# Patient Record
Sex: MALE | Race: WHITE | NOT HISPANIC OR LATINO | Employment: OTHER | URBAN - METROPOLITAN AREA
[De-identification: names, ages, dates, MRNs, and addresses within clinical notes are randomized per-mention and may not be internally consistent; named-entity substitution may affect disease eponyms.]

---

## 2017-05-12 ENCOUNTER — ALLSCRIPTS OFFICE VISIT (OUTPATIENT)
Dept: OTHER | Facility: OTHER | Age: 72
End: 2017-05-12

## 2017-05-12 LAB — HBA1C MFR BLD HPLC: NORMAL %

## 2017-06-13 ENCOUNTER — LAB REQUISITION (OUTPATIENT)
Dept: LAB | Facility: HOSPITAL | Age: 72
End: 2017-06-13
Payer: MEDICARE

## 2017-06-13 ENCOUNTER — ALLSCRIPTS OFFICE VISIT (OUTPATIENT)
Dept: OTHER | Facility: OTHER | Age: 72
End: 2017-06-13

## 2017-06-13 DIAGNOSIS — E11.9 TYPE 2 DIABETES MELLITUS WITHOUT COMPLICATIONS (HCC): ICD-10-CM
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
## 2017-06-13 DIAGNOSIS — Z12.11 ENCOUNTER FOR SCREENING FOR MALIGNANT NEOPLASM OF COLON: ICD-10-CM

## 2017-06-13 DIAGNOSIS — D48.5 NEOPLASM OF UNCERTAIN BEHAVIOR OF SKIN: ICD-10-CM

## 2017-06-13 LAB
ALBUMIN SERPL BCP-MCNC: 3.7 G/DL (ref 3.5–5)
ALP SERPL-CCNC: 89 U/L (ref 46–116)
ALT SERPL W P-5'-P-CCNC: 16 U/L (ref 12–78)
ANION GAP SERPL CALCULATED.3IONS-SCNC: 12 MMOL/L (ref 4–13)
AST SERPL W P-5'-P-CCNC: 16 U/L (ref 5–45)
BILIRUB SERPL-MCNC: 0.4 MG/DL (ref 0.2–1)
BUN SERPL-MCNC: 33 MG/DL (ref 5–25)
CALCIUM SERPL-MCNC: 8.7 MG/DL (ref 8.3–10.1)
CHLORIDE SERPL-SCNC: 105 MMOL/L (ref 100–108)
CHOLEST SERPL-MCNC: 159 MG/DL (ref 50–200)
CO2 SERPL-SCNC: 21 MMOL/L (ref 21–32)
CREAT SERPL-MCNC: 1.44 MG/DL (ref 0.6–1.3)
CREAT UR-MCNC: 152 MG/DL
ERYTHROCYTE [DISTWIDTH] IN BLOOD BY AUTOMATED COUNT: 19.7 % (ref 11.6–15.1)
EST. AVERAGE GLUCOSE BLD GHB EST-MCNC: 154 MG/DL
GFR SERPL CREATININE-BSD FRML MDRD: 48.5 ML/MIN/1.73SQ M
GLUCOSE P FAST SERPL-MCNC: 87 MG/DL (ref 65–99)
HBA1C MFR BLD: 7 % (ref 4.2–6.3)
HCT VFR BLD AUTO: 33.8 % (ref 42–52)
HDLC SERPL-MCNC: 53 MG/DL (ref 40–60)
HGB BLD-MCNC: 10.8 G/DL (ref 14–18)
LDLC SERPL CALC-MCNC: 88 MG/DL (ref 0–100)
MCH RBC QN AUTO: 23.5 PG (ref 27–31)
MCHC RBC AUTO-ENTMCNC: 31.9 G/DL (ref 31.4–37.4)
MCV RBC AUTO: 74 FL (ref 82–98)
MICROALBUMIN UR-MCNC: 15.8 MG/L (ref 0–20)
MICROALBUMIN/CREAT 24H UR: 10 MG/G CREATININE (ref 0–30)
PLATELET # BLD AUTO: 220 THOUSANDS/UL (ref 130–400)
PMV BLD AUTO: 9.8 FL (ref 8.9–12.7)
POTASSIUM SERPL-SCNC: 5.2 MMOL/L (ref 3.5–5.3)
PROT SERPL-MCNC: 7.1 G/DL (ref 6.4–8.2)
RBC # BLD AUTO: 4.59 MILLION/UL (ref 4.7–6.1)
SODIUM SERPL-SCNC: 138 MMOL/L (ref 136–145)
TRIGL SERPL-MCNC: 88 MG/DL
WBC # BLD AUTO: 7.3 THOUSAND/UL (ref 4.8–10.8)

## 2017-06-13 PROCEDURE — 82570 ASSAY OF URINE CREATININE: CPT | Performed by: FAMILY MEDICINE

## 2017-06-13 PROCEDURE — 80053 COMPREHEN METABOLIC PANEL: CPT | Performed by: FAMILY MEDICINE

## 2017-06-13 PROCEDURE — 85027 COMPLETE CBC AUTOMATED: CPT | Performed by: FAMILY MEDICINE

## 2017-06-13 PROCEDURE — 36415 COLL VENOUS BLD VENIPUNCTURE: CPT | Performed by: FAMILY MEDICINE

## 2017-06-13 PROCEDURE — 88304 TISSUE EXAM BY PATHOLOGIST: CPT | Performed by: FAMILY MEDICINE

## 2017-06-13 PROCEDURE — 82043 UR ALBUMIN QUANTITATIVE: CPT | Performed by: FAMILY MEDICINE

## 2017-06-13 PROCEDURE — 80061 LIPID PANEL: CPT | Performed by: FAMILY MEDICINE

## 2017-06-13 PROCEDURE — 83036 HEMOGLOBIN GLYCOSYLATED A1C: CPT | Performed by: FAMILY MEDICINE

## 2017-06-13 PROCEDURE — 86617 LYME DISEASE ANTIBODY: CPT | Performed by: FAMILY MEDICINE

## 2017-06-15 LAB
B BURGDOR IGG PATRN SER IB-IMP: POSITIVE
B BURGDOR IGM PATRN SER IB-IMP: NEGATIVE
B BURGDOR18KD IGG SER QL IB: PRESENT
B BURGDOR23KD IGG SER QL IB: ABNORMAL
B BURGDOR23KD IGM SER QL IB: PRESENT
B BURGDOR28KD IGG SER QL IB: PRESENT
B BURGDOR30KD IGG SER QL IB: PRESENT
B BURGDOR39KD IGG SER QL IB: PRESENT
B BURGDOR39KD IGM SER QL IB: ABNORMAL
B BURGDOR41KD IGG SER QL IB: PRESENT
B BURGDOR41KD IGM SER QL IB: ABNORMAL
B BURGDOR45KD IGG SER QL IB: ABNORMAL
B BURGDOR58KD IGG SER QL IB: PRESENT
B BURGDOR66KD IGG SER QL IB: ABNORMAL
B BURGDOR93KD IGG SER QL IB: PRESENT

## 2017-06-18 ENCOUNTER — GENERIC CONVERSION - ENCOUNTER (OUTPATIENT)
Dept: OTHER | Facility: OTHER | Age: 72
End: 2017-06-18

## 2017-06-22 ENCOUNTER — GENERIC CONVERSION - ENCOUNTER (OUTPATIENT)
Dept: OTHER | Facility: OTHER | Age: 72
End: 2017-06-22

## 2017-06-28 ENCOUNTER — ALLSCRIPTS OFFICE VISIT (OUTPATIENT)
Dept: OTHER | Facility: OTHER | Age: 72
End: 2017-06-28

## 2017-09-27 ENCOUNTER — LAB REQUISITION (OUTPATIENT)
Dept: LAB | Facility: HOSPITAL | Age: 72
End: 2017-09-27
Payer: MEDICARE

## 2017-09-27 ENCOUNTER — ALLSCRIPTS OFFICE VISIT (OUTPATIENT)
Dept: OTHER | Facility: OTHER | Age: 72
End: 2017-09-27

## 2017-09-27 DIAGNOSIS — D64.9 ANEMIA: ICD-10-CM

## 2017-09-27 DIAGNOSIS — E11.9 TYPE 2 DIABETES MELLITUS WITHOUT COMPLICATIONS (HCC): ICD-10-CM

## 2017-09-27 LAB
ALBUMIN SERPL BCP-MCNC: 3.6 G/DL (ref 3.5–5)
ALP SERPL-CCNC: 100 U/L (ref 46–116)
ALT SERPL W P-5'-P-CCNC: 18 U/L (ref 12–78)
ANION GAP SERPL CALCULATED.3IONS-SCNC: 13 MMOL/L (ref 4–13)
AST SERPL W P-5'-P-CCNC: 16 U/L (ref 5–45)
BASOPHILS # BLD AUTO: 0.1 THOUSANDS/ΜL (ref 0–0.1)
BASOPHILS NFR BLD AUTO: 1 % (ref 0–1)
BILIRUB SERPL-MCNC: 0.5 MG/DL (ref 0.2–1)
BUN SERPL-MCNC: 31 MG/DL (ref 5–25)
CALCIUM SERPL-MCNC: 8.9 MG/DL (ref 8.3–10.1)
CHLORIDE SERPL-SCNC: 106 MMOL/L (ref 100–108)
CO2 SERPL-SCNC: 21 MMOL/L (ref 21–32)
CREAT SERPL-MCNC: 1.24 MG/DL (ref 0.6–1.3)
CRP SERPL QL: <3 MG/L
EOSINOPHIL # BLD AUTO: 0.1 THOUSAND/ΜL (ref 0–0.61)
EOSINOPHIL NFR BLD AUTO: 3 % (ref 0–6)
ERYTHROCYTE [DISTWIDTH] IN BLOOD BY AUTOMATED COUNT: 16.8 % (ref 11.6–15.1)
EST. AVERAGE GLUCOSE BLD GHB EST-MCNC: 160 MG/DL
GFR SERPL CREATININE-BSD FRML MDRD: 59 ML/MIN/1.73SQ M
GLUCOSE SERPL-MCNC: 90 MG/DL (ref 65–140)
HBA1C MFR BLD: 7.2 % (ref 4.2–6.3)
HCT VFR BLD AUTO: 34.3 % (ref 42–52)
HGB BLD-MCNC: 10.8 G/DL (ref 14–18)
IRON SERPL-MCNC: 32 UG/DL (ref 65–175)
LYMPHOCYTES # BLD AUTO: 1 THOUSANDS/ΜL (ref 0.6–4.47)
LYMPHOCYTES NFR BLD AUTO: 18 % (ref 14–44)
MCH RBC QN AUTO: 23.8 PG (ref 27–31)
MCHC RBC AUTO-ENTMCNC: 31.6 G/DL (ref 31.4–37.4)
MCV RBC AUTO: 76 FL (ref 82–98)
MONOCYTES # BLD AUTO: 0.4 THOUSAND/ΜL (ref 0.17–1.22)
MONOCYTES NFR BLD AUTO: 8 % (ref 4–12)
NEUTROPHILS # BLD AUTO: 4.1 THOUSANDS/ΜL (ref 1.85–7.62)
NEUTS SEG NFR BLD AUTO: 71 % (ref 43–75)
PLATELET # BLD AUTO: 224 THOUSANDS/UL (ref 130–400)
PMV BLD AUTO: 9.1 FL (ref 8.9–12.7)
POTASSIUM SERPL-SCNC: 5.3 MMOL/L (ref 3.5–5.3)
PROT SERPL-MCNC: 7.1 G/DL (ref 6.4–8.2)
RBC # BLD AUTO: 4.55 MILLION/UL (ref 4.7–6.1)
SODIUM SERPL-SCNC: 140 MMOL/L (ref 136–145)
WBC # BLD AUTO: 5.7 THOUSAND/UL (ref 4.8–10.8)

## 2017-09-27 PROCEDURE — 85025 COMPLETE CBC W/AUTO DIFF WBC: CPT | Performed by: FAMILY MEDICINE

## 2017-09-27 PROCEDURE — 83036 HEMOGLOBIN GLYCOSYLATED A1C: CPT | Performed by: FAMILY MEDICINE

## 2017-09-27 PROCEDURE — 80053 COMPREHEN METABOLIC PANEL: CPT | Performed by: FAMILY MEDICINE

## 2017-09-27 PROCEDURE — 83540 ASSAY OF IRON: CPT | Performed by: FAMILY MEDICINE

## 2017-09-27 PROCEDURE — 36415 COLL VENOUS BLD VENIPUNCTURE: CPT | Performed by: FAMILY MEDICINE

## 2017-09-27 PROCEDURE — 86140 C-REACTIVE PROTEIN: CPT | Performed by: FAMILY MEDICINE

## 2017-09-27 PROCEDURE — 83020 HEMOGLOBIN ELECTROPHORESIS: CPT | Performed by: FAMILY MEDICINE

## 2017-10-02 ENCOUNTER — GENERIC CONVERSION - ENCOUNTER (OUTPATIENT)
Dept: OTHER | Facility: OTHER | Age: 72
End: 2017-10-02

## 2017-10-02 LAB
HGB A MFR BLD: 1.7 % (ref 0.7–3.1)
HGB A MFR BLD: 98.3 % (ref 94–98)
HGB C MFR BLD: 0 %
HGB F MFR BLD: 0 % (ref 0–2)
HGB FRACT BLD-IMP: ABNORMAL
HGB S BLD QL SOLY: NEGATIVE
HGB S MFR BLD: 0 %

## 2017-12-06 ENCOUNTER — ALLSCRIPTS OFFICE VISIT (OUTPATIENT)
Dept: OTHER | Facility: OTHER | Age: 72
End: 2017-12-06

## 2017-12-06 LAB — HBA1C MFR BLD HPLC: 6.8 %

## 2017-12-07 NOTE — PROGRESS NOTES
Assessment    1  Type 2 diabetes mellitus (250 00) (E11 9)   2  Anemia, unspecified type (285 9) (D64 9)   3  History of Acute bilateral low back pain with left-sided sciatica (724 2,724 3) (M54 42)    Plan  Type 2 diabetes mellitus    · Follow-up visit in 6 months Evaluation and Treatment  Follow-up  Status: Hold For -Scheduling  Requested for: 78LKJ2956   · Hemoglobin A1c- POC; Status:Active - Perform Order; Requested for:02Umy2904;    · Begin a limited exercise program ; Status:Complete;   Done: 10LRD4024   · Continue with our present treatment plan ; Status:Complete;   Done: 43UVO4238   · Eat a normal well-balanced diet ; Status:Complete;   Done: 40ULQ9266   · Have your eyes examined by an eye doctor every year ; Status:Complete;   Done:60Dar7971   · Inspect your feet daily ; Status:Complete;   Done: 15KSG9892   · It is important to take good care of your feet if you have diabetes ; Status:Complete;  Done: 90JDE9775   · Restrict the salt in your diet by avoiding highly salted foods ; Status:Complete;   Done:12Qrm4543   · We recommend that you bring your body mass index down to 26 ; Status:Complete;  Done: 08RAF7194    Discussion/Summary    PLENTY OF FLUIDSLEAFY VEGETABLESSUPPLEMENT - FERROUS SULFATE 89 M - 1 TWICE A 40 Ivy Olegario M  The treatment plan was reviewed with the patient/guardian  The patient/guardian understands and agrees with the treatment plan      Chief Complaint  Patient is here today for a diabetic visit, L  Na/CORDELL      History of Present Illness  PATIENT RETURNS TO REVIEW MEDICAL ISSUES  IRON IN 2000 [facility] IN [facility]OL[facility]  MEDICATION WELLFOR HGB A1C  ACHEIMPROVEDCONCERNS      Review of Systems   Constitutional: no fever,-- no chills-- and-- not feeling tired  Eyes: no eyesight problems  ENT: no sore throat-- and-- no nasal discharge  Cardiovascular: no chest pain,-- the heart rate was not fast,-- no palpitations-- and-- no extremity edema    Respiratory: no shortness of breath,-- no cough,-- no wheezing-- and-- no shortness of breath during exertion  Gastrointestinal: no abdominal pain,-- no nausea,-- no constipation-- and-- no diarrhea  Musculoskeletal: arthralgias-- and-- joint stiffness, but-- no myalgias  Integumentary: no rashes-- and-- no skin lesions  Neurological: no headache,-- no numbness,-- no tingling-- and-- no dizziness  Psychiatric: no anxiety-- and-- no depression  Endocrine: no muscle weakness  Hematologic/Lymphatic: no swollen glands  ROS reviewed  Active Problems  1  Anemia, unspecified type (285 9) (D64 9)   2  Colon cancer screening (V76 51) (Z12 11)   3  Refused influenza vaccine (V64 06) (Z28 21)   4  Type 2 diabetes mellitus (250 00) (E11 9)    Past Medical History  1  History of Acute bilateral low back pain with left-sided sciatica (724 2,724 3) (M54 42)   2  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)   3  History of Tick bite, subsequent encounter (V58 89,919 4) (W57 XXXD)    The active problems and past medical history were reviewed and updated today  Surgical History  1  History of High Gastric Bypass   2  History of Knee Replacement    The surgical history was reviewed and updated today  Family History  Mother    1  Family history of congestive heart failure (V17 49) (Z82 49)  Family History    2  Denied: Family history of mental disorder   3  Denied: Family history of substance abuse    The family history was reviewed and updated today  Social History     · Active advance directive (V49 89) (Z78 9)   · Alcohol use (V49 89) (Z78 9)   · Caffeine use (V49 89) (F15 90)   · Dental care, regularly   · Never smoked  The social history was reviewed and updated today  Current Meds   1  Actos 45 MG Oral Tablet; TAKE 1 TABLET ONCE DAILY; Therapy: (Recorded:26Itw1893) to Recorded   2  Farxiga 5 MG Oral Tablet; TAKE 1 TABLET BY MOUTH EVERY MORNING;  Therapy: (Recorded:12May2017) to Recorded 3  Glimepiride 4 MG Oral Tablet; Take 1 tablet twice daily; Therapy: (Recorded:85Fet5071) to Recorded   4  MetFORMIN HCl - 500 MG Oral Tablet; take 2 tablet twice daily; Therapy: (Recorded:76Qsm8420) to Recorded    The medication list was reviewed and updated today  Allergies  1  morphine    Vitals  Vital Signs    Recorded: 24NTD3085 09:03AM   Temperature 98 2 F, Temporal   Heart Rate 68, L Radial   Pulse Quality Normal, L Radial   Respiration Quality Normal   Respiration 16   Systolic 430, LUE, Sitting   Diastolic 90, LUE, Sitting   Height 5 ft 9 5 in   Weight 241 lb    BMI Calculated 35 08   BSA Calculated 2 25   O2 Saturation 98       Physical Exam   Constitutional  General appearance: No acute distress, well appearing and well nourished  Eyes  Conjunctiva and lids: No swelling, erythema, or discharge  Pupils and irises: Equal, round and reactive to light  Ears, Nose, Mouth, and Throat  External inspection of ears and nose: Normal    Pulmonary  Respiratory effort: No increased work of breathing or signs of respiratory distress  Auscultation of lungs: Clear to auscultation, equal breath sounds bilaterally, no wheezes, no rales, no rhonci  Cardiovascular  Auscultation of heart: Normal rate and rhythm, normal S1 and S2, without murmurs  Examination of extremities for edema and/or varicosities: Normal    Abdomen  Abdomen: Non-tender, no masses  Liver and spleen: No hepatomegaly or splenomegaly  Lymphatic  Palpation of lymph nodes in neck: No lymphadenopathy  Musculoskeletal  Gait and station: Normal    Digits and nails: Abnormal    Inspection/palpation of joints, bones, and muscles: Abnormal    Skin  Skin and subcutaneous tissue: Normal without rashes or lesions  Neurologic  Sensation: No sensory loss     Psychiatric  Orientation to person, place and time: Normal    Mood and affect: Normal          Future Appointments    Date/Time Provider Specialty Site   06/04/2018 09:00 AM Pina Malone MD [facility]   Electronically signed by : Liam Steven MD; Dec  6 2017  9:45AM EST                       (Author)

## 2018-01-12 VITALS
HEART RATE: 82 BPM | WEIGHT: 236 LBS | RESPIRATION RATE: 16 BRPM | TEMPERATURE: 98.3 F | HEIGHT: 70 IN | DIASTOLIC BLOOD PRESSURE: 90 MMHG | SYSTOLIC BLOOD PRESSURE: 150 MMHG | BODY MASS INDEX: 33.79 KG/M2

## 2018-01-12 VITALS
BODY MASS INDEX: 35.07 KG/M2 | HEART RATE: 70 BPM | TEMPERATURE: 98.2 F | OXYGEN SATURATION: 96 % | RESPIRATION RATE: 20 BRPM | DIASTOLIC BLOOD PRESSURE: 74 MMHG | HEIGHT: 70 IN | SYSTOLIC BLOOD PRESSURE: 120 MMHG | WEIGHT: 245 LBS

## 2018-01-12 NOTE — RESULT NOTES
Verified Results  (1) CBC/PLT/DIFF 60HKT3783 09:39AM Brittney Aggarwal Order Number: [id number]     Test Name Result Flag Reference   WBC COUNT 5 70 Thousand/uL  4 80-10 80   RBC COUNT 4 55 Million/uL L 4 70-6 10   HEMOGLOBIN 10 8 g/dL L 14 0-18 0   HEMATOCRIT 34 3 % L 42 0-52 0   MCV 76 fL L 82-98   MCH 23 8 pg L 27 0-31 0   MCHC 31 6 g/dL  31 4-37 4   RDW 16 8 % H 11 6-15 1   MPV 9 1 fL  8 9-12 7   PLATELET COUNT 151 Thousands/uL  130-400   NEUTROPHILS RELATIVE PERCENT 71 %  43-75   LYMPHOCYTES RELATIVE PERCENT 18 %  14-44   MONOCYTES RELATIVE PERCENT 8 %  4-12   EOSINOPHILS RELATIVE PERCENT 3 %  0-6   BASOPHILS RELATIVE PERCENT 1 %  0-1   NEUTROPHILS ABSOLUTE COUNT 4 10 Thousands/? ??L  1 85-7 62   LYMPHOCYTES ABSOLUTE COUNT 1 00 Thousands/? ??L  0 60-4 47   MONOCYTES ABSOLUTE COUNT 0 40 Thousand/? ??L  0 17-1 22   EOSINOPHILS ABSOLUTE COUNT 0 10 Thousand/? ??L  0 00-0 61   BASOPHILS ABSOLUTE COUNT 0 10 Thousands/? ??L  0 00-0 10     (1) COMPREHENSIVE METABOLIC PANEL 98ALM1698 26:93PA Brittney Aggarwal Order Number: [id number]     Test Name Result Flag Reference   GLUCOSE,RANDM 90 mg/dL     If the patient is fasting, the ADA then defines impaired fasting glucose as > 100 mg/dL and diabetes as > or equal to 123 mg/dL  Specimen collection should occur prior to Sulfasalazine administration due to the potential for falsely depressed results  Specimen collection should occur prior to Sulfapyridine administration due to the potential for falsely elevated results     SODIUM 140 mmol/L  136-145   POTASSIUM 5 3 mmol/L  3 5-5 3   CHLORIDE 106 mmol/L  100-108   CARBON DIOXIDE 21 mmol/L  21-32   ANION GAP (CALC) 13 mmol/L  4-13   BLOOD UREA NITROGEN 31 mg/dL H 5-25   CREATININE 1 24 mg/dL  0 60-1 30   Standardized to IDMS reference method   CALCIUM 8 9 mg/dL  8 3-10 1   BILI, TOTAL 0 50 mg/dL  0 20-1 00   ALK PHOSPHATAS 100 U/L     ALT (SGPT) 18 U/L  12-78   Specimen collection should occur prior to Sulfasalazine administration due to the potential for falsely depressed results  AST(SGOT) 16 U/L  5-45   Specimen collection should occur prior to Sulfasalazine administration due to the potential for falsely depressed results  ALBUMIN 3 6 g/dL  3 5-5 0   TOTAL PROTEIN 7 1 g/dL  6 4-8 2   eGFR 59 ml/min/1 73sq m     [facility] Disease Education Program recommendations are as follows:  GFR calculation is accurate only with a steady state creatinine  Chronic Kidney disease less than 60 ml/min/1 73 sq  meters  Kidney failure less than 15 ml/min/1 73 sq  meters  (1) C-REACTIVE PROTEIN 97QKR9288 09:39AM Kit Minor   TW Order Number: [id number]     Test Name Result Flag Reference   C-REACT PROTEIN <3 0 mg/L  <3 0     (1) HEMOGLOBIN ELECTROPHORESIS 14UKM4689 09:39AM Kit Minor   TW Order Number: [id number]     Test Name Result Flag Reference   HEMOGLOBIN A2 QUANTITATION 1 7 %  0 7 - 3 1   HEMOGLOBIN C QUANTITATION 0 0 %  0 0   HEMOGLOBIN F QUANTITATION 0 0 %  0 0 - 2 0   HEMOGLOBIN S QUANTITATION 0 0 %  0 0   HEMOGLOBIN SOLUBILITY Negative  Negative   HGB INTERPRETATION Comment     Normal adult hemoglobin present  HGB A 98 3 % H 94 0 - 98 0   Performed at:  [facility] [address]  [id number]  : Hortensia Floyd MD, Phone:  [phone]     (1) HEMOGLOBIN A1C 33PKX3349 09:39AM Jessica Rucker Order Number: [id number]     Test Name Result Flag Reference   HEMOGLOBIN A1C 7 2 % H 4 2-6 3   EST  AVG  GLUCOSE 160 mg/dl       (1) IRON 54QBD7699 09:39AM Kit Minor   TW Order Number: [id number]     Test Name Result Flag Reference   IRON 32 ug/dL L    Patients treated with metal-binding drugs (ie  Deferoxamine) may have depressed iron values

## 2018-01-13 VITALS
DIASTOLIC BLOOD PRESSURE: 80 MMHG | SYSTOLIC BLOOD PRESSURE: 128 MMHG | RESPIRATION RATE: 20 BRPM | TEMPERATURE: 98.5 F | HEIGHT: 70 IN | HEART RATE: 70 BPM | WEIGHT: 239 LBS | OXYGEN SATURATION: 95 % | BODY MASS INDEX: 34.22 KG/M2

## 2018-01-14 VITALS
OXYGEN SATURATION: 98 % | WEIGHT: 240 LBS | RESPIRATION RATE: 16 BRPM | HEIGHT: 70 IN | TEMPERATURE: 98.7 F | SYSTOLIC BLOOD PRESSURE: 134 MMHG | BODY MASS INDEX: 34.36 KG/M2 | DIASTOLIC BLOOD PRESSURE: 70 MMHG | HEART RATE: 60 BPM

## 2018-01-15 NOTE — RESULT NOTES
Verified Results  (1) CBC/ PLT (NO DIFF) 06QYA3432 11:27AM [employer] Order Number: [id number]     Test Name Result Flag Reference   HEMATOCRIT 33 8 % L 42 0-52 0   HEMOGLOBIN 10 8 g/dL L 14 0-18 0   MCHC 31 9 g/dL  31 4-37 4   MCH 23 5 pg L 27 0-31 0   MCV 74 fL L 82-98   PLATELET COUNT 022 Thousands/uL  130-400   RBC COUNT 4 59 Million/uL L 4 70-6 10   RDW 19 7 % H 11 6-15 1   WBC COUNT 7 30 Thousand/uL  4 80-10 80   MPV 9 8 fL  8 9-12 7     (1) COMPREHENSIVE METABOLIC PANEL 62WCN7374 91:40NS [employer] Order Number: [id number]     Test Name Result Flag Reference   SODIUM 138 mmol/L  136-145   POTASSIUM 5 2 mmol/L  3 5-5 3   CHLORIDE 105 mmol/L  100-108   CARBON DIOXIDE 21 mmol/L  21-32   ANION GAP (CALC) 12 mmol/L  4-13   BLOOD UREA NITROGEN 33 mg/dL H 5-25   CREATININE 1 44 mg/dL H 0 60-1 30   Standardized to IDMS reference method   CALCIUM 8 7 mg/dL  8 3-10 1   BILI, TOTAL 0 40 mg/dL  0 20-1 00   ALK PHOSPHATAS 89 U/L     ALT (SGPT) 16 U/L  12-78   AST(SGOT) 16 U/L  5-45   ALBUMIN 3 7 g/dL  3 5-5 0   TOTAL PROTEIN 7 1 g/dL  6 4-8 2   eGFR Non-African American 48 5 ml/min/1 73sq m     National Kidney Disease Education Program recommendations are as follows:  GFR calculation is accurate only with a steady state creatinine  Chronic Kidney disease less than 60 ml/min/1 73 sq  meters  Kidney failure less than 15 ml/min/1 73 sq  meters  GLUCOSE FASTING 87 mg/dL  65-99     (1) HEMOGLOBIN A1C 13Jun2017 11:27AM [employer] Order Number: [id number]     Test Name Result Flag Reference   HEMOGLOBIN A1C 7 0 % H 4 2-6 3   EST  AVG   GLUCOSE 154 mg/dl       (1) LIPID PANEL, FASTING 13Jun2017 11:27AM [employer] Order Number: [id number]     Test Name Result Flag Reference   CHOLESTEROL 159 mg/dL     HDL,DIRECT 53 mg/dL  40-60   Specimen collection should occur prior to Metamizole administration due to the potential for falsely depressed results  LDL CHOLESTEROL CALCULATED 88 mg/dL  0-100   Triglyceride:         Normal              <150 mg/dl       Borderline High    150-199 mg/dl       High               200-499 mg/dl       Very High          >499 mg/dl  Cholesterol:         Desirable        <200 mg/dl      Borderline High  200-239 mg/dl      High             >239 mg/dl  HDL Cholesterol:        High    >59 mg/dL      Low     <41 mg/dL  LDL CALCULATED:    This screening LDL is a calculated result  It does not have the accuracy of the Direct Measured LDL in the monitoring of patients with hyperlipidemia and/or statin therapy  Direct Measure LDL (VFH397) must be ordered separately in these patients  TRIGLYCERIDES 88 mg/dL  <=150   Specimen collection should occur prior to N-Acetylcysteine or Metamizole administration due to the potential for falsely depressed results  (1) LYME ANTIBODY, WESTERN BLOT 13Jun2017 11:27AM [employer] Order Number: [id number]     Test Name Result Flag Reference   LYME 18 KD IGG Present A    LYME 23 KD IGG Absent     LYME 28 KD IGG Present A    LYME 30 KD IGG Present A    LYME 39 KD IGG Present A    LYME 41 KD IGG Present A    LYME 45 KD IGG Absent     LYME 58 KD IGG Present A    LYME 66 KD IGG Absent     LYME 93 KD IGG Present A    LYME 23 KD IGM Present A    LYME 39 KD IGM Absent     LYME 41 KD IGM Absent     LYME IGG WB INTERP  Positive A    Positive: 5 of the following                                 Borrelia-specific bands:                                 18,23,28,30,39,41,45,58,                                 66, and 93  Negative: No bands or banding                                 patterns which do not                                 meet positive criteria  LYME IGM WB INTERP  Negative     Note: An equivocal or positive EIA result followed by a negative  Western Blot result is considered NEGATIVE   An equivocal or positive  EIA result followed by a positive Western Blot is considered POSITIVE  by the CDC  Positive: 2 of the following bands: 23,39 or 41  Negative: No bands or banding patterns which do not meet positive  criteria  Criteria for positivity are those recommended by CDC/ASTPHLD   p23=Osp C, o58=ghfxbwdhu  Note:  Sera from individuals with the following may cross react in the  Lyme Western Blot assays: other spirochetal diseases (periodontal  disease, leptospirosis, relapsing fever, yaws, and pinta);  connective autoimmune (Rheumatoid Arthritis and Systemic Lupus  Erythematosus and also individuals with Antinuclear Antibody);  other infections COFFEE [facility] Spotted Fever; Me[place]-Barr Virus,  and Cytomegalovirus)      Performed at:  [facility] [address]  [id number]  : Adi Jackson MD, Phone:  [phone]     (1) MICROALBUMIN CREATININE RATIO, RANDOM URINE 40EKO9630 11:27AM Jeanie Nadiya Order Number: [id number]     Test Name Result Flag Reference   MICROALBUMIN/ CREAT R 10 mg/g creatinine  0-30   MICROALBUMIN,URINE 15 8 mg/L  0 0-20 0   CREATININE URINE 152 0 mg/dL

## 2018-01-16 NOTE — RESULT NOTES
Verified Results  (1) TISSUE EXAM 21Jun2017 08:22PM Radha Arenas     Test Name Result Flag Reference   LAB AP CASE REPORT (Report)     Surgical Pathology Report             Case: [id number]                   Authorizing Provider: Ava Soto MD     Collected:      06/13/2017           Pathologist:      Petros Thornton MD        Received:      06/14/2017 7531        Specimen:  Skin, Cyst/Tag/Debridement, Posterior neck   LAB AP FINAL DIAGNOSIS      A  Skin, Posterior neck, shave biopsy:  - Dermal nevus  Interpretation performed at [facility], [address] [place] [address], [address] [place] [address]        Electronically signed by Petros Thornton MD on 6/21/2017 at 8:22 PM   LAB AP SURGICAL ADDITIONAL INFORMATION (Report)     These tests were developed and their performance characteristics   determined by [facility] or [employer]  They may not be cleared or approved by the [place]  Food and   Drug Administration  The FDA has determined that such clearance or   approval is not necessary  These tests are used for clinical purposes  They should not be regarded as investigational or for research  This   laboratory has been approved by [place] [address], designated as a high-complexity   laboratory and is qualified to perform these tests  LAB AP GROSS DESCRIPTION (Report)     A  The specimen is received in formalin, labeled with the patient's name   and hospital number, and is designated posterior neck pigmented   pedunculated papilloma  The specimen consists of a tan brown focally   pigmented polypoid shave of skin measuring 0 6 cm in greatest dimension  The margin of resection is inked green  The specimen is bisected   lengthwise  Entirely submitted  One cassette  ?? ?  Note: The estimated total formalin fixation time based upon information   provided by the submitting clinician and the standard processing schedule   is 33 0 hours    ???  MAC

## 2018-01-22 VITALS
WEIGHT: 241 LBS | HEIGHT: 70 IN | TEMPERATURE: 98.2 F | SYSTOLIC BLOOD PRESSURE: 128 MMHG | OXYGEN SATURATION: 98 % | HEART RATE: 68 BPM | DIASTOLIC BLOOD PRESSURE: 90 MMHG | BODY MASS INDEX: 34.5 KG/M2 | RESPIRATION RATE: 16 BRPM

## 2018-01-23 NOTE — PROGRESS NOTES
Assessment    1  Type 2 diabetes mellitus (250 00) (E11 9)   2  Anemia, unspecified type (285 9) (D64 9)   3  History of Acute bilateral low back pain with left-sided sciatica (724 2,724 3) (M54 42)    Plan  Screening for genitourinary condition    · *VB - Urinary Incontinence Screen (Dx Z13 89 Screen for UI); Status:Complete;   Done:  42QFG1863 12:57PM  Type 2 diabetes mellitus    · Begin a limited exercise program ; Status:Complete;   Done: 17OWS3927   · Continue with our present treatment plan ; Status:Complete;   Done: 18JLC2250   · Eat a normal well-balanced diet ; Status:Complete;   Done: 63APX6708   · Have your eyes examined by an eye doctor every year ; Status:Complete;   Done:  88GJS0135   · Inspect your feet daily ; Status:Complete;   Done: 12FRS1742   · It is important to take good care of your feet if you have diabetes ; Status:Complete;    Done: 61HYL1583   · Restrict the salt in your diet by avoiding highly salted foods ; Status:Complete;   Done:  06GLF7611   · We recommend that you bring your body mass index down to 26 ; Status:Complete;    Done: 68XSB9472   · Hemoglobin A1c- POC; Status:Complete;   Done: 82PER1058 12:46PM    Discussion/Summary  Impression: Initial Annual Wellness Visit  Cardiovascular screening and counseling: the risks and benefits of screening were discussed  Diabetes screening and counseling: the risks and benefits of screening were discussed and screening is current  Colorectal cancer screening and counseling: the risks and benefits of screening were discussed  Prostate cancer screening and counseling: screening is current  Osteoporosis screening and counseling: screening not indicated  Abdominal aortic aneurysm screening and counseling: the risks and benefits of screening were discussed  HIV screening and counseling: screening not indicated   Immunizations: the patient declines the influenza vaccination, the patient declines the pneumococcal vaccination, hepatitis B vaccination series is not indicated at this time due to the patient's low risk of lily the disease, the patient declines the Zostavax vaccine and the risks and benefits of the Td vaccine were discussed with the patient  Advance Directive Planning: not complete  Advice and education were given regarding seat belt use and sunscreen use  Patient Discussion: follow-up visit needed in 6M  Chief Complaint  Patient is here today for his annual wellness visit, L  [place]zon/LPN      History of Present Illness  The patient is being seen for the initial annual wellness visit  Medicare Screening and Risk Factors   Hospitalizations: no previous hospitalizations  Medicare Screening Tests Risk Questions   Abdominal aortic aneurysm risk assessment: none indicated  HIV risk assessment: none indicated  Drug and Alcohol Use: The patient has never smoked cigarettes  The patient reports occasional alcohol use and wine with meals  Alcohol concern:   The patient has no concerns about alcohol abuse  He has never used illicit drugs  Diet and Physical Activity: Current diet includes well balanced meals, low carbohydrate food choices, 1 servings of fruit per day, 1 servings of vegetables per day, servings of meat per day, 1 servings of simple carbohydrates per day, 1 servings of dairy products per day, 2 cups of coffee per day and 2ses of water daily cans of diet soda per day  The patient does not exercise  Exercise: constantly working  Mood Disorder and Cognitive Impairment Screening: He denies feeling down, depressed, or hopeless over the past two weeks  He denies feeling little interest or pleasure in doing things over the past two weeks     Cognitive impairment screening: denies difficulty learning/retaining new information, denies difficulty handling complex tasks, denies difficulty with reasoning, denies difficulty with spatial ability and orientation, denies difficulty with language and denies difficulty with behavior  Functional Ability/Level of Safety: Hearing is normal bilaterally  He does not use a hearing aid  The patient is currently able to do activities of daily living without limitations, able to do instrumental activities of daily living without limitations, able to participate in social activities without limitations and able to drive without limitations  Injury History: alcohol use, no deconditioning, no urinary incontinence and no previous fall  Home safety risk factors:  no unfamiliar surroundings, no loose rugs, no poor household lighting, no uneven floors, no household clutter, grab bars in the bathroom and handrails on the stairs  Advance Directives: Advance directives: living will, durable power of  for health care directives and advance directives  Co-Managers and Medical Equipment/Suppliers: See Patient Care Team      Active Problems    1  Anemia, unspecified type (285 9) (D64 9)   2  Colon cancer screening (V76 51) (Z12 11)   3  Refused influenza vaccine (V64 06) (Z28 21)   4  Type 2 diabetes mellitus (250 00) (E11 9)    Past Medical History    1  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)   2  History of Tick bite, subsequent encounter (V58 89,919 4) (W57 XXXD)    The active problems and past medical history were reviewed and updated today  Surgical History    1  History of High Gastric Bypass   2  History of Knee Replacement    The surgical history was reviewed and updated today  Family History  Mother    1  Family history of congestive heart failure (V17 49) (Z82 49)  Family History    2  Denied: Family history of mental disorder   3  Denied: Family history of substance abuse    The family history was reviewed and updated today         Social History    · Active advance directive (V49 89) (Z78 9)   · Alcohol use (V49 89) (Z78 9)   · Caffeine use (V49 89) (F15 90)   · Dental care, regularly   · Never smoked  The social history was reviewed and updated today  Current Meds   1  Actos 45 MG Oral Tablet; TAKE 1 TABLET ONCE DAILY; Therapy: (Recorded:12May2017) to Recorded   2  Farxiga 5 MG Oral Tablet; TAKE 1 TABLET BY MOUTH EVERY MORNING; Therapy: (Recorded:12May2017) to Recorded   3  Glimepiride 4 MG Oral Tablet; Take 1 tablet twice daily; Therapy: (Recorded:12May2017) to Recorded   4  MetFORMIN HCl - 500 MG Oral Tablet; take 2 tablet twice daily; Therapy: (Recorded:12May2017) to Recorded    The medication list was reviewed and updated today        Allergies    1  morphine    Immunizations  Influenza --- [employer] File: Temporarily Deferred: Pt refuses, As of: 13Jun2017, Defer for 5 Years   PPSV --- [employer] File: Temporarily Deferred: Pt refuses, As of: 13Jun2017, Defer for 5 Years     Vitals  Signs   Recorded: 17Uwy5354 09:03AM   Temperature: 98 2 F, Temporal  Heart Rate: 68, L Radial  Pulse Quality: Normal, L Radial  Respiration Quality: Normal  Respiration: 16  Systolic: 761, LUE, Sitting  Diastolic: 90, LUE, Sitting  Height: 5 ft 9 5 in  Weight: 241 lb   BMI Calculated: 35 08  BSA Calculated: 2 25  O2 Saturation: 98    Results/Data  *VB - Urinary Incontinence Screen (Dx Z13 89 Screen for UI) 34JWI6475 12:57PM Narciso Sharyan     Test Name Result Flag Reference   Urinary Incontinence Assessment 73ZWH6802       Hemoglobin A1c- POC 88XBE0825 12:46PM Narciso Shake     Test Name Result Flag Reference   HEMOGLOBIN A1C 6 8         Future Appointments    Date/Time Provider Specialty Site   06/04/2018 09:00 AM Narciso Stover MD [facility]   Electronically signed by : Alanna Steen MD; Dec  9 2017 11:53AM EST                       (Author)

## 2018-04-18 ENCOUNTER — APPOINTMENT (EMERGENCY)
Dept: RADIOLOGY | Facility: HOSPITAL | Age: 73
End: 2018-04-18
Payer: OTHER MISCELLANEOUS

## 2018-04-18 ENCOUNTER — HOSPITAL ENCOUNTER (EMERGENCY)
Facility: HOSPITAL | Age: 73
Discharge: HOME | End: 2018-04-18
Attending: EMERGENCY MEDICINE
Payer: OTHER MISCELLANEOUS

## 2018-04-18 VITALS
OXYGEN SATURATION: 98 % | SYSTOLIC BLOOD PRESSURE: 161 MMHG | RESPIRATION RATE: 18 BRPM | WEIGHT: 235 LBS | DIASTOLIC BLOOD PRESSURE: 92 MMHG | TEMPERATURE: 99 F | HEART RATE: 89 BPM

## 2018-04-18 DIAGNOSIS — S09.90XA MINOR HEAD INJURY, INITIAL ENCOUNTER: ICD-10-CM

## 2018-04-18 DIAGNOSIS — S39.92XA INJURY OF LOW BACK, INITIAL ENCOUNTER: ICD-10-CM
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
## 2018-04-18 PROCEDURE — 72131 CT LUMBAR SPINE W/O DYE: CPT

## 2018-04-18 PROCEDURE — 99284 EMERGENCY DEPT VISIT MOD MDM: CPT

## 2018-04-18 PROCEDURE — 96372 THER/PROPH/DIAG INJ SC/IM: CPT

## 2018-04-18 PROCEDURE — 70450 CT HEAD/BRAIN W/O DYE: CPT

## 2018-04-18 RX ORDER — GLIMEPIRIDE 4 MG/1
4 TABLET ORAL
COMMUNITY
End: 2020-12-22 | Stop reason: SDUPTHER

## 2018-04-18 RX ORDER — ACETAMINOPHEN AND CODEINE PHOSPHATE 300; 30 MG/1; MG/1
1-2 TABLET ORAL EVERY 6 HOURS PRN
Qty: 15 TABLET | Refills: 0 | Status: SHIPPED | OUTPATIENT
Start: 2018-04-18 | End: 2018-04-28

## 2018-04-18 RX ORDER — CYCLOBENZAPRINE HCL 10 MG
10 TABLET ORAL 2 TIMES DAILY PRN
Qty: 10 TABLET | Refills: 0 | Status: SHIPPED | OUTPATIENT
Start: 2018-04-18 | End: 2020-12-22 | Stop reason: ALTCHOICE

## 2018-04-18 RX ADMIN — HYDROMORPHONE HYDROCHLORIDE 1 MG: 1 INJECTION, SOLUTION INTRAMUSCULAR; INTRAVENOUS; SUBCUTANEOUS at 17:32

## 2018-04-18 NOTE — DISCHARGE INSTRUCTIONS
Acute Low Back Pain/Injury  WHAT YOU NEED TO KNOW:   Acute low back pain is sudden discomfort in your lower back area that lasts for up to 6 weeks  The discomfort makes it difficult to tolerate activity  It is often related to injury, inflammation, disc problems, arthritis  DISCHARGE INSTRUCTIONS:   Return to the emergency department if:   · You have severe pain  · You have sudden stiffness and heaviness on both buttocks down to both legs  · You have numbness or weakness in one leg, or pain in both legs  · You have numbness in your genital area or across your lower back  · You cannot control your urine or bowel movements  Contact your healthcare provider if:   · You have a fever  · You have pain at night or when you rest     · Your pain does not get better with treatment  · You have pain that worsens when you cough or sneeze  · You suddenly feel something pop or snap in your back  · You have questions or concerns about your condition or care  Medicines: The following medicines may be ordered by your healthcare provider:  · Acetaminophen  decreases pain  It is available without a doctor's order  Ask how much to take and how often to take it  Follow directions  Acetaminophen can cause liver damage if not taken correctly  · NSAIDs  help decrease swelling and pain  This medicine is available with or without a doctor's order  NSAIDs can cause stomach bleeding or kidney problems in certain people  If you take blood thinner medicine, always ask your healthcare provider if NSAIDs are safe for you  Always read the medicine label and follow directions  · Prescription pain medicine  may be given  Ask your healthcare provider how to take this medicine safely  · Muscle relaxers  decrease pain by relaxing the muscles in your lower spine  · Take your medicine as directed  Contact your healthcare provider if you think your medicine is not helping or if you have side effects   Tell him of her if you are allergic to any medicine  Keep a list of the medicines, vitamins, and herbs you take  Include the amounts, and when and why you take them  Bring the list or the pill bottles to follow-up visits  Carry your medicine list with you in case of an emergency  Self-care:   · Stay active  as much as you can without causing more pain  Bed rest could make your back pain worse  Start with some light exercises such as walking  Avoid heavy lifting until your pain is gone  Ask for more information about the activities or exercises that are right for you  · Ice  helps decrease swelling, pain, and muscle spams  Put crushed ice in a plastic bag  Cover it with a towel  Place it on your lower back for 20 to 30 minutes every 2 hours  Do this for about 2 to 3 days after your pain starts, or as directed  · Heat  helps decrease pain and muscle spasms  Start to use heat after treatment with ice has stopped  Use a small towel dampened with warm water or a heating pad, or sit in a warm bath  Apply heat on the area for 20 to 30 minutes every 2 hours for as many days as directed  Alternate heat and ice  Prevent acute low back pain:   · Use proper body mechanics  ¨ Bend at the hips and knees when you  objects  Do not bend from the waist  Use your leg muscles as you lift the load  Do not use your back  Keep the object close to your chest as you lift it  Try not to twist or lift anything above your waist     ¨ Change your position often when you stand for long periods of time  Rest one foot on a small box or footrest, and then switch to the other foot often  ¨ Try not to sit for long periods of time  When you do, sit in a straight-backed chair with your feet flat on the floor  Never reach, pull, or push while you are sitting  · Do exercises that strengthen your back muscles  Warm up before you exercise  Ask your healthcare provider the best exercises for you  · Maintain a healthy weight    Ask your healthcare provider how much you should weigh  Ask him to help you create a weight loss plan if you are overweight  Follow up with your healthcare provider as directed:  Return for a follow-up visit if you still have pain after 1 to 3 weeks of treatment  You may need to visit an orthopedist if your back pain lasts more than 12 weeks  Write down your questions so you remember to ask them during your visits  © 2017 [facility] [facility] Information is for End User's use only and may not be sold, redistributed or otherwise used for commercial purposes  All illustrations and images included in CareNotes® are the copyrighted property of A D A M , Inc  or [employer]  The above information is an  only  It is not intended as medical advice for individual conditions or treatments  Talk to your doctor, nurse or pharmacist before following any medical regimen to see if it is safe and effective for you

## 2018-04-18 NOTE — ED PROVIDER NOTES
History  Chief Complaint   Patient presents with    Fall     pt fell back, hit head  pt was coming out of truck and his r foot got caught  no loc, denies blood thinners    Back Pain     73 yo male climbing out of tall truck got foot caught and he fell backward  He hit back of head on door and fell onto lower back  No LOC  No nausea/blurred vision  C/o severe lower back pain  Was able to walk fine after accident  No numbness  No neck pain  No chest/belly pain  Had been well prior to this other than getting over a cold  History provided by:  Patient   used: No        Prior to Admission Medications   Prescriptions Last Dose Informant Patient Reported? Taking? Dapagliflozin Propanediol (FARXIGA) 5 MG TABS   Yes Yes   Sig: Take 5 mg by mouth daily   glimepiride (AMARYL) 4 mg tablet   Yes Yes   Sig: Take 4 mg by mouth every morning before breakfast   metFORMIN (GLUCOPHAGE) 1000 MG tablet   Yes Yes   Sig: Take 1,000 mg by mouth 2 (two) times a day with meals      Facility-Administered Medications: None       Past Medical History:   Diagnosis Date    Diabetes mellitus ([address] )        History reviewed  No pertinent surgical history  History reviewed  No pertinent family history  I have reviewed and agree with the history as documented  Social History   Substance Use Topics    Smoking status: Former Smoker    Smokeless tobacco: Former User    Alcohol use No        Review of Systems   Constitutional: Negative  Negative for chills and fever  HENT: Negative  Negative for congestion and sore throat  Eyes: Negative  Respiratory: Negative  Negative for cough and shortness of breath  Cardiovascular: Negative  Negative for chest pain and leg swelling  Gastrointestinal: Negative  Negative for abdominal pain, diarrhea, nausea and vomiting  Genitourinary: Negative  Negative for dysuria, flank pain and hematuria  Musculoskeletal: Positive for back pain   Negative for myalgias  Skin: Negative  Negative for rash and wound  Neurological: Negative  Negative for dizziness and headaches  Psychiatric/Behavioral: Negative  Negative for confusion and hallucinations  The patient is not nervous/anxious  All other systems reviewed and are negative  Physical Exam  ED Triage Vitals [04/18/18 1700]   Temperature Pulse Respirations Blood Pressure SpO2   99 °F (37 2 °C) 89 18 161/92 98 %      Temp Source Heart Rate Source Patient Position - Orthostatic VS BP Location FiO2 (%)   Tympanic Monitor Sitting Right arm --      Pain Score       --           Orthostatic Vital Signs  Vitals:    04/18/18 1700   BP: 161/92   Pulse: 89   Patient Position - Orthostatic VS: Sitting       Physical Exam   Constitutional: He is oriented to person, place, and time  He appears well-developed and well-nourished  No distress  HENT:   Head: Normocephalic and atraumatic  Eyes: Conjunctivae are normal  Pupils are equal, round, and reactive to light  No scleral icterus  Neck: Normal range of motion  Neck supple  Cardiovascular: Normal rate, regular rhythm and normal heart sounds  No murmur heard  Pulmonary/Chest: Effort normal and breath sounds normal  No respiratory distress  He exhibits no tenderness  Abdominal: Soft  Bowel sounds are normal  He exhibits no distension  There is no tenderness  Musculoskeletal: Normal range of motion  He exhibits no edema, tenderness or deformity  Mild ttp right paralumbar spine   Neurological: He is alert and oriented to person, place, and time  No cranial nerve deficit  He exhibits normal muscle tone  Skin: Skin is warm and dry  No rash noted  He is not diaphoretic  No erythema  No pallor  Psychiatric: He has a normal mood and affect  His behavior is normal    Nursing note and vitals reviewed        ED Medications  Medications   HYDROmorphone (DILAUDID) injection 1 mg (1 mg Intramuscular Given 4/18/18 7822)       Diagnostic Studies  Results Reviewed     None                 CT lumbar spine without contrast   Final Result by Lupis Daley DO (04/18 1805)      Diffuse lower thoracic and diffuse lumbar spondylitic degenerative change with annular bulging, endplate hypertrophic change and posterior element hypertrophic change  Moderate canal stenosis noted throughout the lumbar canal L1-2, L2-3, L3-4 and L4-5  Multilevel foraminal narrowing most pronounced at L4-5 on the right and L5-S1 bilaterally, right greater than left  No fracture status post fall  Probable cyst arising from the medial aspect of the left kidney, incompletely evaluated on this examination  Consider follow-up renal ultrasound since there are no prior studies at this institution for comparison  The study was marked in EPIC for significant notification  Workstation performed: [id number]         CT head without contrast   Final Result by Lupis Daley DO (04/18 1758)      No acute intracranial abnormality  There is arachnoid cyst identified within the right middle cranial fossa anteriorly  Mild chronic microangiopathic change and age-appropriate cerebral volume loss  Workstation performed: [id number]                    Procedures  Procedures       Phone Contacts  ED Phone Contact    ED Course  ED Course                                MDM  Number of Diagnoses or Management Options  Diagnosis management comments: No acute findings on CT scan other than incidental ? Cyst on kidney - pt  Advised that he will need follow up with PCP for that  Advised rest, alternate ice and heat, will treat pain, follow up if any problems or worsening      CritCare Time    Disposition  Final diagnoses:   Fall, initial encounter   Minor head injury, initial encounter   Injury of low back, initial encounter     Time reflects when diagnosis was documented in both MDM as applicable and the Disposition within this note     Time User Action Codes Description Comment 4/48/1139  2:75 PM Kendra Mukherjee Add [P65  QCBY] Fall, initial encounter     3/91/4233  4:30 PM Alejandro JIMENEZ Add [B65 26CY] Minor head injury, initial encounter     4/98/9654  4:89 PM Kendra Mukherjee Add [D04 15EV] Injury of low back, initial encounter       ED Disposition     ED Disposition Condition Comment    Discharge  Olya Haider discharge to home/self care  Condition at discharge: Stable        Follow-up Information     Follow up With Specialties Details Why Contact Info    Mansoor Aguilera MD [facility] Medicine Schedule an appointment as soon as possible for a visit  [address] [phone]          Patient's Medications   Discharge Prescriptions    ACETAMINOPHEN-CODEINE (TYLENOL #3) 300-30 MG PER TABLET    Take 1-2 tablets by mouth every 6 (six) hours as needed for moderate pain for up to 10 days       Start Date: 4/18/2018 End Date: 4/28/2018       Order Dose: 1-2 tablets       Quantity: 15 tablet    Refills: 0    CYCLOBENZAPRINE (FLEXERIL) 10 MG TABLET    Take 1 tablet (10 mg total) by mouth 2 (two) times a day as needed for muscle spasms       Start Date: 4/18/2018 End Date: --       Order Dose: 10 mg       Quantity: 10 tablet    Refills: 0     No discharge procedures on file      ED Provider  Electronically Signed by           Jero Hernandez MD  25/14/77 0046

## 2018-04-19 ENCOUNTER — VBI (OUTPATIENT)
Dept: ADMINISTRATIVE | Facility: OTHER | Age: 73
End: 2018-04-19

## 2018-04-19 NOTE — TELEPHONE ENCOUNTER
PLEASE ASK PATIENT IF THEY ARE A PATIENT OF [facility] OR DO THEY NEED TO GO THERE FOR WC - ASK PATIENT IF HE HAS A PCP? FIND OUT INSURANCE/ AND THEN ASK IF YOU COULD SCHEDULE AN APPT TO ESTABLISH CARE AT ONE OF OUR OFFICE  TY

## 2018-04-20 ENCOUNTER — TELEPHONE (OUTPATIENT)
Dept: ADMINISTRATIVE | Facility: OTHER | Age: 73
End: 2018-04-20

## 2018-04-20 ENCOUNTER — VBI (OUTPATIENT)
Dept: ADMINISTRATIVE | Facility: OTHER | Age: 73
End: 2018-04-20

## 2018-04-20 NOTE — TELEPHONE ENCOUNTER
THIS PATIENT HAS AN ALIAS OF [facility]  1945 AND THAT IS WHERE YOU WILL FIND HIS ED VISIT DATED 18 WORK[employer] COMP  FOR A FALL W/BACK INJURY AND AN INCIDENTAL FINDING OF A CYST ON KIDNEY

## 2018-04-20 NOTE — TELEPHONE ENCOUNTER
This is a Patient of [facility] who goes by the alias of Mazin Albright [facility] 1945 who had a Workers Comp ED Visit on 18 (fall with Back injury), with an incidental finding of a cyst on his kidney  I had trouble locating him as a [employer] Patient so when I placed a call to his home I LMOM to call me back directly  Patient called back and stated that he goes by Mazin Albright [facility] 1945 for work related  and other reasons  His real name is Catalina Hawkins [facility] 1945 and that is how he is listed as a patient at [facility] and his PCP is Dr Bobby Schaefer  I scheduled an appointment for him on Mon 18  @ 1:30 with Dr Bobby Schaefer under Catalina Hawkins [facility] 1945 but his ED Report is under Mazin Albright [facility] 1945 and it has an  incidental  Finding  Please explain to patient that he should have one chart and his  needs to be corrected  I tried explaining this but patient was not understanding me

## 2018-04-23 ENCOUNTER — OFFICE VISIT (OUTPATIENT)
Dept: FAMILY MEDICINE CLINIC | Facility: CLINIC | Age: 73
End: 2018-04-23
Payer: MEDICARE

## 2018-04-23 VITALS
SYSTOLIC BLOOD PRESSURE: 140 MMHG | TEMPERATURE: 98 F | OXYGEN SATURATION: 98 % | HEART RATE: 88 BPM | DIASTOLIC BLOOD PRESSURE: 80 MMHG | RESPIRATION RATE: 20 BRPM

## 2018-04-23 DIAGNOSIS — M54.42 ACUTE BILATERAL LOW BACK PAIN WITH LEFT-SIDED SCIATICA: ICD-10-CM

## 2018-04-23 DIAGNOSIS — N28.1 RENAL CYST: Primary | ICD-10-CM

## 2018-04-23 DIAGNOSIS — E11.9 TYPE 2 DIABETES MELLITUS WITHOUT COMPLICATION, WITHOUT LONG-TERM CURRENT USE OF INSULIN (HCC): ICD-10-CM

## 2018-04-23 PROBLEM — D64.9 ANEMIA: Status: ACTIVE | Noted: 2017-06-28

## 2018-04-23 LAB
CREAT UR-MCNC: 85.3 MG/DL
MICROALBUMIN UR-MCNC: 25.7 MG/L (ref 0–20)
MICROALBUMIN/CREAT 24H UR: 30 MG/G CREATININE (ref 0–30)
SL AMB  POCT GLUCOSE, UA: >1000
SL AMB LEUKOCYTE ESTERASE,UA: ABNORMAL
SL AMB POCT BILIRUBIN,UA: ABNORMAL
SL AMB POCT BLOOD,UA: ABNORMAL
SL AMB POCT CLARITY,UA: CLEAR
SL AMB POCT COLOR,UA: YELLOW
SL AMB POCT HEMOGLOBIN AIC: 7.4
SL AMB POCT KETONES,UA: ABNORMAL
SL AMB POCT NITRITE,UA: ABNORMAL
SL AMB POCT PH,UA: 5
SL AMB POCT SPECIFIC GRAVITY,UA: 1.02
SL AMB POCT URINE PROTEIN: ABNORMAL
SL AMB POCT UROBILINOGEN: NORMAL

## 2018-04-23 PROCEDURE — 83036 HEMOGLOBIN GLYCOSYLATED A1C: CPT | Performed by: FAMILY MEDICINE

## 2018-04-23 PROCEDURE — 82570 ASSAY OF URINE CREATININE: CPT | Performed by: FAMILY MEDICINE

## 2018-04-23 PROCEDURE — 82043 UR ALBUMIN QUANTITATIVE: CPT | Performed by: FAMILY MEDICINE

## 2018-04-23 PROCEDURE — 81003 URINALYSIS AUTO W/O SCOPE: CPT | Performed by: FAMILY MEDICINE

## 2018-04-23 PROCEDURE — 99214 OFFICE O/P EST MOD 30 MIN: CPT | Performed by: FAMILY MEDICINE

## 2018-04-23 RX ORDER — HYDROCODONE BITARTRATE AND ACETAMINOPHEN 5; 325 MG/1; MG/1
1 TABLET ORAL EVERY 6 HOURS PRN
Qty: 56 TABLET | Refills: 0 | Status: SHIPPED | OUTPATIENT
Start: 2018-04-23 | End: 2018-05-02 | Stop reason: SDUPTHER

## 2018-04-23 RX ORDER — CYCLOBENZAPRINE HCL 10 MG
10 TABLET ORAL 2 TIMES DAILY
COMMUNITY
End: 2018-04-23 | Stop reason: SDUPTHER

## 2018-04-23 RX ORDER — PIOGLITAZONEHYDROCHLORIDE 45 MG/1
1 TABLET ORAL DAILY
COMMUNITY
End: 2020-12-22 | Stop reason: ALTCHOICE

## 2018-04-23 RX ORDER — CYCLOBENZAPRINE HCL 10 MG
10 TABLET ORAL 2 TIMES DAILY
Qty: 42 TABLET | Refills: 0 | Status: SHIPPED | OUTPATIENT
Start: 2018-04-23 | End: 2020-12-22 | Stop reason: ALTCHOICE

## 2018-04-23 RX ORDER — GLIMEPIRIDE 4 MG/1
1 TABLET ORAL 2 TIMES DAILY
COMMUNITY

## 2018-04-23 RX ORDER — IBUPROFEN 600 MG/1
600 TABLET ORAL EVERY 6 HOURS PRN
Qty: 56 TABLET | Refills: 1 | Status: SHIPPED | OUTPATIENT
Start: 2018-04-23

## 2018-04-23 NOTE — PATIENT INSTRUCTIONS
PLENTY OF FLUIDS  CONTINUE TO MONITOR DIETARY CARBOHYDRATE INTAKE  US OF KIDNEY    HGB A1C, UA TODAY    Rest  Warm and cool compresses to back  MEDICATION AS DIRECTED  CALL Thursday / Friday WITH UPDATE    RV

## 2018-04-23 NOTE — PROGRESS NOTES
Assessment/Plan:    Problem List Items Addressed This Visit     Type 2 diabetes mellitus ([address] )     DOES NOT CHECK SUGARS  COMPLIANT WITH MEDICATION  DENIES ANY NUMBNESS, TINGLING IN FEET    - DISCUSSED DIETARY MODIFICATION OF CARBOHYDRATES  - HGB A1C AND URINE STUDIES DUE TODAY  - FOOT CARE  - RV 3 MONTHS           Relevant Medications    pioglitazone (ACTOS) 45 mg tablet    Dapagliflozin Propanediol (FARXIGA) 5 MG TABS    glimepiride (AMARYL) 4 mg tablet    metFORMIN (GLUCOPHAGE) 500 mg tablet    Other Relevant Orders    POCT urine dip auto non-scope    Microalbumin / creatinine urine ratio    POCT hemoglobin A1c    US retroperitoneal complete    Renal cyst - Primary     RECENT ED VISIT  CT WAS PERFORMED  ? RENAL CYST     - NEEDS FOLLOW UP US         Relevant Orders    POCT urine dip auto non-scope    Microalbumin / creatinine urine ratio    POCT hemoglobin A1c    US retroperitoneal complete          Patient Instructions   PLENTY OF FLUIDS  CONTINUE TO MONITOR DIETARY CARBOHYDRATE INTAKE  US OF KIDNEY    HGB A1C, UA TODAY    RV 3M      Return in about 3 months (around 7/23/2018) for Recheck  Subjective:      Patient ID: Kathy Ortega is a 72 y o  male  No chief complaint on file  REVIEWED MEDICAL ISSUES  FOLLOW UP FROM ER        The following portions of the patient's history were reviewed and updated as appropriate: allergies, current medications, past family history, past medical history, past social history, past surgical history and problem list     Review of Systems   Constitutional: Negative for chills, fatigue and fever  HENT: Negative for sore throat  Eyes: Negative for discharge  Respiratory: Negative for cough and chest tightness  Cardiovascular: Negative for chest pain and palpitations  Gastrointestinal: Negative for abdominal pain, diarrhea, nausea and vomiting  Musculoskeletal: Positive for arthralgias, back pain and neck stiffness  Negative for gait problem     Neurological: Negative for dizziness, weakness and headaches  Hematological: Negative for adenopathy  Psychiatric/Behavioral: The patient is not nervous/anxious  Current Outpatient Prescriptions   Medication Sig Dispense Refill    Dapagliflozin Propanediol (FARXIGA) 5 MG TABS Take 1 tablet by mouth Daily      glimepiride (AMARYL) 4 mg tablet Take 1 tablet by mouth 2 (two) times a day      metFORMIN (GLUCOPHAGE) 500 mg tablet Take 2 tablets by mouth 2 (two) times a day      pioglitazone (ACTOS) 45 mg tablet Take 1 tablet by mouth daily       No current facility-administered medications for this visit  Objective: There were no vitals taken for this visit  Physical Exam   Constitutional: He is oriented to person, place, and time  He appears well-developed and well-nourished  HENT:   Head: Normocephalic and atraumatic  Eyes: Conjunctivae and EOM are normal  Pupils are equal, round, and reactive to light  Right eye exhibits no discharge  Left eye exhibits no discharge  Neck: Neck supple  No JVD present  No thyromegaly present  Cardiovascular: Normal rate, regular rhythm and normal heart sounds  Pulses are no weak pulses  No murmur heard  Pulses:       Dorsalis pedis pulses are 1+ on the right side, and 1+ on the left side  Posterior tibial pulses are 1+ on the right side, and 1+ on the left side  Pulmonary/Chest: Effort normal and breath sounds normal  He has no wheezes  He has no rales  Abdominal: Soft  Bowel sounds are normal  He exhibits no mass  There is no hepatosplenomegaly  There is no tenderness  There is no rebound, no guarding and no CVA tenderness  MILDLY OBESE   Musculoskeletal: Normal range of motion  He exhibits tenderness  He exhibits no edema or deformity     MILD LOWER LUMBAR SACRAL SPINAL TENDERNESS  L SCIATIC [address] TENDERNESS  NEG SLR  REFLEXES WERE PRESERVED   Feet:   Right Foot:   Skin Integrity: Negative for ulcer, skin breakdown, erythema, warmth, callus or dry skin  Left Foot:   Skin Integrity: Negative for ulcer, skin breakdown, erythema, warmth, callus or dry skin  Lymphadenopathy:     He has no cervical adenopathy  He has no axillary adenopathy  Neurological: He is alert and oriented to person, place, and time  Skin: Skin is warm and dry  No rash noted  No erythema  Psychiatric: He has a normal mood and affect  His behavior is normal  Judgment and thought content normal        Patient's shoes and socks removed  Right Foot/Ankle   Right Foot Inspection  Skin Exam: skin normal and skin intact no dry skin, no warmth, no callus, no erythema, no maceration, no abnormal color, no pre-ulcer, no ulcer and no callus                          Toe Exam: ROM and strength within normal limits  Sensory       Monofilament testing: intact  Vascular    The right DP pulse is 1+  The right PT pulse is 1+  Left Foot/Ankle  Left Foot Inspection  Skin Exam: skin normal and skin intactno dry skin, no warmth, no erythema, no maceration, normal color, no pre-ulcer, no ulcer and no callus                         Toe Exam: ROM and strength within normal limits                   Sensory       Monofilament: intact  Vascular    The left DP pulse is 1+  The left PT pulse is 1+  Assign Risk Category:  No deformity present; No loss of protective sensation;  No weak pulses       Risk: 0         Homer Tran MD

## 2018-04-23 NOTE — ASSESSMENT & PLAN NOTE
DOES NOT CHECK SUGARS  COMPLIANT WITH MEDICATION  DENIES ANY NUMBNESS, TINGLING IN FEET    - DISCUSSED DIETARY MODIFICATION OF CARBOHYDRATES  - HGB A1C AND URINE STUDIES DUE TODAY  - FOOT CARE  - RV 3 MONTHS

## 2018-04-23 NOTE — ASSESSMENT & PLAN NOTE
S/P ER VISIT  BACK PAIN SECONDARY TO FALL  STILL UNCOMFORTABLE  RADIATES TO L SIDE  DENIES ANY NUMBNESS OR TINGLING

## 2018-05-01 NOTE — TELEPHONE ENCOUNTER
Patient goes by the name of Kostas Cobb Apo  [address] [facility] spoke to him at his appointment  He goes by [facility]  - [facility] only    Jaspreet Bill

## 2018-05-02 DIAGNOSIS — M54.42 ACUTE BILATERAL LOW BACK PAIN WITH LEFT-SIDED SCIATICA: ICD-10-CM

## 2018-05-02 RX ORDER — HYDROCODONE BITARTRATE AND ACETAMINOPHEN 5; 325 MG/1; MG/1
1 TABLET ORAL EVERY 6 HOURS PRN
Qty: 56 TABLET | Refills: 0 | Status: SHIPPED | OUTPATIENT
Start: 2018-05-02 | End: 2020-12-22 | Stop reason: ALTCHOICE

## 2018-10-09 ENCOUNTER — OFFICE VISIT (OUTPATIENT)
Dept: FAMILY MEDICINE CLINIC | Facility: CLINIC | Age: 73
End: 2018-10-09
Payer: MEDICARE

## 2018-10-09 VITALS
BODY MASS INDEX: 34.36 KG/M2 | WEIGHT: 240 LBS | SYSTOLIC BLOOD PRESSURE: 108 MMHG | HEART RATE: 66 BPM | DIASTOLIC BLOOD PRESSURE: 70 MMHG | OXYGEN SATURATION: 99 % | RESPIRATION RATE: 20 BRPM | TEMPERATURE: 97.7 F | HEIGHT: 70 IN

## 2018-10-09 DIAGNOSIS — Z23 NEED FOR VACCINATION AGAINST STREPTOCOCCUS PNEUMONIAE: ICD-10-CM

## 2018-10-09 DIAGNOSIS — R19.7 DIARRHEA, UNSPECIFIED TYPE: Primary | ICD-10-CM

## 2018-10-09 DIAGNOSIS — Z23 NEED FOR INFLUENZA VACCINATION: ICD-10-CM

## 2018-10-09 DIAGNOSIS — Z23 NEED FOR TETANUS BOOSTER: ICD-10-CM

## 2018-10-09 PROCEDURE — 99213 OFFICE O/P EST LOW 20 MIN: CPT | Performed by: FAMILY MEDICINE

## 2018-10-09 RX ORDER — DICYCLOMINE HYDROCHLORIDE 10 MG/1
10 CAPSULE ORAL 2 TIMES DAILY PRN
Qty: 30 CAPSULE | Refills: 0 | Status: SHIPPED | OUTPATIENT
Start: 2018-10-09 | End: 2018-10-19 | Stop reason: SDUPTHER

## 2018-10-09 RX ORDER — CIPROFLOXACIN 500 MG/1
500 TABLET, FILM COATED ORAL EVERY 12 HOURS SCHEDULED
Qty: 20 TABLET | Refills: 0 | Status: SHIPPED | OUTPATIENT
Start: 2018-10-09 | End: 2018-10-19 | Stop reason: ALTCHOICE

## 2018-10-09 RX ORDER — PIOGLITAZONEHYDROCHLORIDE 45 MG/1
TABLET ORAL
COMMUNITY
Start: 2018-07-12 | End: 2020-12-22 | Stop reason: ALTCHOICE

## 2018-10-09 NOTE — PROGRESS NOTES
Assessment/Plan:    Problem List Items Addressed This Visit        Other    Diarrhea - Primary    Relevant Medications    ciprofloxacin (CIPRO) 500 mg tablet    dicyclomine (BENTYL) 10 mg capsule      Other Visit Diagnoses     Need for influenza vaccination        Relevant Orders    influenza vaccine, 9069-7240, high-dose, PF 0 5 mL, for patients 65 yr+ (FLUZONE HIGH-DOSE)    Need for vaccination against Streptococcus pneumoniae        Relevant Orders    Pneumococcal polysaccharide vaccine 23-valent greater than or equal to 1yo subcutaneous/IM    Need for tetanus booster        Relevant Orders    TD VACCINE GREATER THAN OR EQUAL TO 8YO PRESERVATIVE FREE IM          Patient Instructions   PLENTY OF FLUIDS  REST  AVOID DAIRY PRODUCTS  TRIAL OF CIPRO    IF SYMPTOMS CONTINUE, CONSIDER CULTURE AND POSSIBLE GI CONSULT      No Follow-up on file  Subjective:      Patient ID: Cristino Vargas is a 72 y o  male  Chief Complaint   Patient presents with    Diarrhea     x 2 weeks       Diarrhea    This is a new problem  The current episode started 1 to 4 weeks ago  The problem occurs 5 to 10 times per day  The problem has been unchanged  The stool consistency is described as mucous and watery  The patient states that diarrhea does not awaken him from sleep  Associated symptoms include abdominal pain, bloating and chills  Pertinent negatives include no arthralgias, coughing, fever, headaches, increased  flatus, myalgias, sweats, URI, vomiting or weight loss  The symptoms are aggravated by dairy products  There are no known risk factors  He has tried increased fluids for the symptoms  The treatment provided mild relief  There is no history of bowel resection, inflammatory bowel disease, irritable bowel syndrome, malabsorption, a recent abdominal surgery or short gut syndrome         The following portions of the patient's history were reviewed and updated as appropriate: allergies, current medications, past family history, past medical history, past social history, past surgical history and problem list     Review of Systems   Constitutional: Positive for chills  Negative for fatigue, fever and weight loss  HENT: Negative for sore throat  Eyes: Negative for discharge  Respiratory: Negative for cough and chest tightness  Cardiovascular: Negative for chest pain and palpitations  Gastrointestinal: Positive for abdominal pain, bloating and diarrhea  Negative for flatus, nausea and vomiting  Musculoskeletal: Negative for arthralgias, gait problem and myalgias  Neurological: Negative for dizziness, weakness and headaches  Hematological: Negative for adenopathy  Psychiatric/Behavioral: The patient is not nervous/anxious  Current Outpatient Prescriptions   Medication Sig Dispense Refill    cyclobenzaprine (FLEXERIL) 10 mg tablet Take 1 tablet (10 mg total) by mouth 2 (two) times a day as needed for muscle spasms 10 tablet 0    Dapagliflozin Propanediol (FARXIGA) 5 MG TABS Take 5 mg by mouth daily      glimepiride (AMARYL) 4 mg tablet Take 4 mg by mouth every morning before breakfast      metFORMIN (GLUCOPHAGE) 1000 MG tablet Take 1,000 mg by mouth 2 (two) times a day with meals      ciprofloxacin (CIPRO) 500 mg tablet Take 1 tablet (500 mg total) by mouth every 12 (twelve) hours for 10 days 20 tablet 0    dicyclomine (BENTYL) 10 mg capsule Take 1 capsule (10 mg total) by mouth 2 (two) times a day as needed (CRAMPS) 30 capsule 0    metFORMIN (GLUCOPHAGE) 500 mg tablet       pioglitazone (ACTOS) 45 mg tablet        No current facility-administered medications for this visit  Objective:    /70   Pulse 66   Temp 97 7 °F (36 5 °C) (Temporal)   Resp 20   Ht 5' 9 5" (1 765 m)   Wt 109 kg (240 lb)   SpO2 99%   BMI 34 93 kg/m²        Physical Exam   Constitutional: He is oriented to person, place, and time  He appears well-developed and well-nourished     HENT:   Head: Normocephalic and atraumatic  Eyes: Pupils are equal, round, and reactive to light  Conjunctivae and EOM are normal  Right eye exhibits no discharge  Left eye exhibits no discharge  Neck: Neck supple  No JVD present  No thyromegaly present  Cardiovascular: Normal rate, regular rhythm and normal heart sounds  No murmur heard  Pulmonary/Chest: Effort normal and breath sounds normal  He has no wheezes  He has no rales  Abdominal: Soft  Bowel sounds are normal  He exhibits no mass  There is no hepatosplenomegaly  There is no tenderness  There is no rebound, no guarding and no CVA tenderness  Musculoskeletal: Normal range of motion  He exhibits no edema, tenderness or deformity  Lymphadenopathy:     He has no cervical adenopathy  He has no axillary adenopathy  Neurological: He is alert and oriented to person, place, and time  Skin: Skin is warm and dry  No rash noted  No erythema  Psychiatric: He has a normal mood and affect   His behavior is normal  Judgment and thought content normal               Patrick Garcia MD

## 2018-10-09 NOTE — PATIENT INSTRUCTIONS
PLENTY OF FLUIDS  REST  AVOID DAIRY PRODUCTS  TRIAL OF CIPRO    IF SYMPTOMS CONTINUE, CONSIDER CULTURE AND POSSIBLE GI CONSULT

## 2018-10-16 ENCOUNTER — TELEPHONE (OUTPATIENT)
Dept: FAMILY MEDICINE CLINIC | Facility: CLINIC | Age: 73
End: 2018-10-16

## 2018-10-16 NOTE — TELEPHONE ENCOUNTER
Wanted to update you    The pills that you prescribed are not working      He is trying over the counter Immodium    Please advise    [phone]

## 2018-10-19 ENCOUNTER — OFFICE VISIT (OUTPATIENT)
Dept: FAMILY MEDICINE CLINIC | Facility: CLINIC | Age: 73
End: 2018-10-19
Payer: MEDICARE

## 2018-10-19 VITALS
RESPIRATION RATE: 20 BRPM | HEART RATE: 72 BPM | HEIGHT: 70 IN | BODY MASS INDEX: 34.65 KG/M2 | TEMPERATURE: 97.4 F | WEIGHT: 242 LBS | SYSTOLIC BLOOD PRESSURE: 118 MMHG | DIASTOLIC BLOOD PRESSURE: 78 MMHG

## 2018-10-19 DIAGNOSIS — R19.7 DIARRHEA, UNSPECIFIED TYPE: Primary | ICD-10-CM

## 2018-10-19 DIAGNOSIS — R10.9 ABDOMINAL CRAMPING: ICD-10-CM

## 2018-10-19 PROCEDURE — 87493 C DIFF AMPLIFIED PROBE: CPT | Performed by: FAMILY MEDICINE

## 2018-10-19 PROCEDURE — 87505 NFCT AGENT DETECTION GI: CPT | Performed by: FAMILY MEDICINE

## 2018-10-19 PROCEDURE — 87177 OVA AND PARASITES SMEARS: CPT | Performed by: FAMILY MEDICINE

## 2018-10-19 PROCEDURE — 87209 SMEAR COMPLEX STAIN: CPT | Performed by: FAMILY MEDICINE

## 2018-10-19 PROCEDURE — 99213 OFFICE O/P EST LOW 20 MIN: CPT | Performed by: FAMILY MEDICINE

## 2018-10-19 RX ORDER — METRONIDAZOLE 500 MG/1
500 TABLET ORAL EVERY 12 HOURS SCHEDULED
Qty: 20 TABLET | Refills: 0 | Status: SHIPPED | OUTPATIENT
Start: 2018-10-19 | End: 2018-10-29

## 2018-10-19 RX ORDER — DICYCLOMINE HYDROCHLORIDE 10 MG/1
10 CAPSULE ORAL 3 TIMES DAILY PRN
Qty: 45 CAPSULE | Refills: 0 | Status: SHIPPED | OUTPATIENT
Start: 2018-10-19 | End: 2020-12-22 | Stop reason: ALTCHOICE

## 2018-10-19 NOTE — PATIENT INSTRUCTIONS
PLENTY OF FLUIDS - GATORADE  REST  AVOID DAIRY  STOOL CULTURES, C DIFF, O AND P STUDIES  TRIA OF FLAGYL  CALL ON Tuesday WITH UPDATE, SOONER PRN

## 2018-10-19 NOTE — PROGRESS NOTES
Assessment/Plan:    Problem List Items Addressed This Visit        Other    Diarrhea - Primary    Relevant Medications    metroNIDAZOLE (FLAGYL) 500 mg tablet    dicyclomine (BENTYL) 10 mg capsule    Other Relevant Orders    Clostridium difficile toxin by PCR    Stool Enteric Bacterial Panel by PCR    Ova and parasite examination    Abdominal cramping    Relevant Medications    metroNIDAZOLE (FLAGYL) 500 mg tablet    Other Relevant Orders    Clostridium difficile toxin by PCR    Stool Enteric Bacterial Panel by PCR    Ova and parasite examination          Patient Instructions   PLENTY OF FLUIDS - GATORADE  REST  AVOID DAIRY  STOOL CULTURES, C DIFF, O AND P STUDIES  TRIA OF FLAGYL  CALL ON Tuesday WITH UPDATE, SOONER PRN      Return if symptoms worsen or fail to improve  Subjective:      Patient ID: Kamilla Starr is a 72 y o  male  Chief Complaint   Patient presents with    Diarrhea       PATIENT RETURNS  DIARRHEA IS LESS FREQUENT  CONTINUES TO BE DARK AND WATERY  NOT FOUL  DENIES ANY BLOOD  CONTINUES TO HAVE CRAMPING PRIOR TO STOOLING    DENIES ANY FEVER OR CHILLS  DOES FEEL MORE WEAK  COMPLIANT WITH MEDICATION        The following portions of the patient's history were reviewed and updated as appropriate: allergies, current medications, past family history, past medical history, past social history, past surgical history and problem list     Review of Systems   Constitutional: Positive for fatigue  Negative for chills and fever  HENT: Negative for sore throat  Eyes: Negative for discharge  Respiratory: Negative for cough and chest tightness  Cardiovascular: Negative for chest pain and palpitations  Gastrointestinal: Positive for abdominal pain and diarrhea  Negative for abdominal distention, anal bleeding, blood in stool, constipation, nausea, rectal pain and vomiting  Musculoskeletal: Negative for arthralgias and gait problem  Neurological: Negative for dizziness, weakness and headaches  Hematological: Negative for adenopathy  Psychiatric/Behavioral: The patient is not nervous/anxious  Current Outpatient Prescriptions   Medication Sig Dispense Refill    cyclobenzaprine (FLEXERIL) 10 mg tablet Take 1 tablet (10 mg total) by mouth 2 (two) times a day as needed for muscle spasms 10 tablet 0    dicyclomine (BENTYL) 10 mg capsule Take 1 capsule (10 mg total) by mouth 3 (three) times a day as needed (CRAMPS) 45 capsule 0    glimepiride (AMARYL) 4 mg tablet Take 4 mg by mouth every morning before breakfast      metFORMIN (GLUCOPHAGE) 1000 MG tablet Take 1,000 mg by mouth 2 (two) times a day with meals      metFORMIN (GLUCOPHAGE) 500 mg tablet       Dapagliflozin Propanediol (FARXIGA) 5 MG TABS Take 5 mg by mouth daily      metroNIDAZOLE (FLAGYL) 500 mg tablet Take 1 tablet (500 mg total) by mouth every 12 (twelve) hours for 10 days 20 tablet 0    pioglitazone (ACTOS) 45 mg tablet        No current facility-administered medications for this visit  Objective:    /78 (BP Location: Right arm, Patient Position: Sitting, Cuff Size: Extra-Large)   Pulse 72   Temp (!) 97 4 °F (36 3 °C) (Temporal)   Resp 20   Ht 5' 9 5" (1 765 m)   Wt 110 kg (242 lb)   BMI 35 22 kg/m²        Physical Exam   Constitutional: He is oriented to person, place, and time  He appears well-developed and well-nourished  HENT:   Head: Normocephalic and atraumatic  Eyes: Pupils are equal, round, and reactive to light  Conjunctivae and EOM are normal  Right eye exhibits no discharge  Left eye exhibits no discharge  Neck: Neck supple  No JVD present  No thyromegaly present  Cardiovascular: Normal rate, regular rhythm and normal heart sounds  No murmur heard  Pulmonary/Chest: Effort normal and breath sounds normal  He has no wheezes  He has no rales  Abdominal: Soft  Bowel sounds are normal  He exhibits no mass  There is no hepatosplenomegaly  There is no tenderness   There is no rebound, no guarding and no CVA tenderness  Musculoskeletal: Normal range of motion  He exhibits no edema, tenderness or deformity  Lymphadenopathy:     He has no cervical adenopathy  He has no axillary adenopathy  Neurological: He is alert and oriented to person, place, and time  Skin: Skin is warm and dry  No rash noted  No erythema  Psychiatric: He has a normal mood and affect   His behavior is normal  Judgment and thought content normal               Holland Arana MD

## 2018-10-20 LAB
C DIFF TOX GENS STL QL NAA+PROBE: NORMAL
CAMPYLOBACTER DNA SPEC NAA+PROBE: NORMAL
SALMONELLA DNA SPEC QL NAA+PROBE: NORMAL
SHIGA TOXIN STX GENE SPEC NAA+PROBE: NORMAL
SHIGELLA DNA SPEC QL NAA+PROBE: NORMAL

## 2018-10-24 LAB — O+P STL CONC: NORMAL

## 2018-11-21 ENCOUNTER — TELEPHONE (OUTPATIENT)
Dept: FAMILY MEDICINE CLINIC | Facility: CLINIC | Age: 73
End: 2018-11-21

## 2018-11-21 NOTE — TELEPHONE ENCOUNTER
[address] [facility] regard to Status Update for the kidney Ultrasound Dr Ami Layne ordered in April    rt

## 2019-01-22 ENCOUNTER — TELEPHONE (OUTPATIENT)
Dept: FAMILY MEDICINE CLINIC | Facility: CLINIC | Age: 74
End: 2019-01-22

## 2019-01-22 DIAGNOSIS — K52.9 CHRONIC DIARRHEA: Primary | ICD-10-CM

## 2019-01-22 RX ORDER — DIPHENOXYLATE HYDROCHLORIDE AND ATROPINE SULFATE 2.5; .025 MG/1; MG/1
2 TABLET ORAL 4 TIMES DAILY PRN
Qty: 60 TABLET | Refills: 0 | Status: SHIPPED | OUTPATIENT
Start: 2019-01-22 | End: 2020-12-22 | Stop reason: ALTCHOICE

## 2019-01-22 NOTE — TELEPHONE ENCOUNTER
Did you find something stronger that the Equate brand of Immodim medication for his diarrhea? Or do you have to call something in? Previous pill didn't help  [facility]    Please Advise

## 2020-04-20 ENCOUNTER — TELEPHONE (OUTPATIENT)
Dept: FAMILY MEDICINE CLINIC | Facility: CLINIC | Age: 75
End: 2020-04-20

## 2020-05-26 ENCOUNTER — APPOINTMENT (OUTPATIENT)
Dept: RADIOLOGY | Facility: CLINIC | Age: 75
End: 2020-05-26
Payer: MEDICARE

## 2020-05-26 ENCOUNTER — OFFICE VISIT (OUTPATIENT)
Dept: URGENT CARE | Facility: CLINIC | Age: 75
End: 2020-05-26
Payer: OTHER MISCELLANEOUS

## 2020-05-26 VITALS
RESPIRATION RATE: 16 BRPM | OXYGEN SATURATION: 100 % | DIASTOLIC BLOOD PRESSURE: 70 MMHG | TEMPERATURE: 98.4 F | HEART RATE: 80 BPM | SYSTOLIC BLOOD PRESSURE: 110 MMHG | BODY MASS INDEX: 39.01 KG/M2 | WEIGHT: 268 LBS

## 2020-05-26 DIAGNOSIS — S22.32XA CLOSED FRACTURE OF ONE RIB OF LEFT SIDE, INITIAL ENCOUNTER: ICD-10-CM

## 2020-05-26 DIAGNOSIS — S49.90XA SHOULDER INJURY, INITIAL ENCOUNTER: ICD-10-CM

## 2020-05-26 DIAGNOSIS — S49.90XA SHOULDER INJURY, INITIAL ENCOUNTER: Primary | ICD-10-CM

## 2020-05-26 PROCEDURE — 1160F RVW MEDS BY RX/DR IN RCRD: CPT | Performed by: FAMILY MEDICINE

## 2020-05-26 PROCEDURE — 73010 X-RAY EXAM OF SHOULDER BLADE: CPT

## 2020-05-26 PROCEDURE — 1036F TOBACCO NON-USER: CPT | Performed by: FAMILY MEDICINE

## 2020-05-26 PROCEDURE — 99213 OFFICE O/P EST LOW 20 MIN: CPT | Performed by: FAMILY MEDICINE

## 2020-05-26 PROCEDURE — 71101 X-RAY EXAM UNILAT RIBS/CHEST: CPT

## 2020-05-26 RX ORDER — ACETAMINOPHEN AND CODEINE PHOSPHATE 300; 30 MG/1; MG/1
1 TABLET ORAL
Qty: 7 TABLET | Refills: 0 | Status: SHIPPED | OUTPATIENT
Start: 2020-05-26 | End: 2020-12-22 | Stop reason: ALTCHOICE

## 2020-05-26 RX ORDER — NAPROXEN 500 MG/1
500 TABLET ORAL 2 TIMES DAILY WITH MEALS
Qty: 28 TABLET | Refills: 0 | Status: SHIPPED | OUTPATIENT
Start: 2020-05-26 | End: 2020-06-09

## 2020-05-26 RX ORDER — ELASTIC BANDAGE 3"
BANDAGE TOPICAL DAILY
Qty: 1 EACH | Refills: 0
Start: 2020-05-26 | End: 2020-12-22 | Stop reason: ALTCHOICE

## 2020-10-23 DIAGNOSIS — Z13.220 SCREENING FOR LIPID DISORDERS: ICD-10-CM

## 2020-10-23 DIAGNOSIS — Z13.29 SCREENING FOR THYROID DISORDER: ICD-10-CM

## 2020-10-23 DIAGNOSIS — Z13.6 SCREENING FOR HYPERTENSION: ICD-10-CM

## 2020-10-23 DIAGNOSIS — Z12.5 SCREENING PSA (PROSTATE SPECIFIC ANTIGEN): ICD-10-CM

## 2020-10-23 DIAGNOSIS — E11.9 TYPE 2 DIABETES MELLITUS WITHOUT COMPLICATION, WITHOUT LONG-TERM CURRENT USE OF INSULIN (HCC): ICD-10-CM

## 2020-10-23 DIAGNOSIS — Z00.00 ENCOUNTER FOR MEDICARE ANNUAL WELLNESS EXAM: Primary | ICD-10-CM

## 2020-12-12 LAB — HBA1C MFR BLD: 8.4 % (ref 4.8–5.6)

## 2020-12-13 LAB
ALBUMIN SERPL-MCNC: 4.3 G/DL (ref 3.7–4.7)
ALBUMIN/CREAT UR: 11 MG/G CREAT (ref 0–29)
ALBUMIN/GLOB SERPL: 1.5 {RATIO} (ref 1.2–2.2)
ALP SERPL-CCNC: 146 IU/L (ref 39–117)
ALT SERPL-CCNC: 6 IU/L (ref 0–44)
AST SERPL-CCNC: 15 IU/L (ref 0–40)
BILIRUB SERPL-MCNC: <0.2 MG/DL (ref 0–1.2)
BUN SERPL-MCNC: 33 MG/DL (ref 8–27)
BUN/CREAT SERPL: 21 (ref 10–24)
CALCIUM SERPL-MCNC: 8.8 MG/DL (ref 8.6–10.2)
CHLORIDE SERPL-SCNC: 103 MMOL/L (ref 96–106)
CHOLEST SERPL-MCNC: 179 MG/DL (ref 100–199)
CO2 SERPL-SCNC: 20 MMOL/L (ref 20–29)
CREAT SERPL-MCNC: 1.57 MG/DL (ref 0.76–1.27)
CREAT UR-MCNC: 59.9 MG/DL
GLOBULIN SER-MCNC: 2.9 G/DL (ref 1.5–4.5)
GLUCOSE SERPL-MCNC: 176 MG/DL (ref 65–99)
HDLC SERPL-MCNC: 47 MG/DL
LDLC SERPL CALC-MCNC: 115 MG/DL (ref 0–99)
MICROALBUMIN UR-MCNC: 6.5 UG/ML
POTASSIUM SERPL-SCNC: 5.2 MMOL/L (ref 3.5–5.2)
PROT SERPL-MCNC: 7.2 G/DL (ref 6–8.5)
PSA SERPL-MCNC: <0.1 NG/ML (ref 0–4)
SL AMB EGFR AFRICAN AMERICAN: 50 ML/MIN/1.73
SL AMB EGFR NON AFRICAN AMERICAN: 43 ML/MIN/1.73
SL AMB VLDL CHOLESTEROL CALC: 17 MG/DL (ref 5–40)
SODIUM SERPL-SCNC: 135 MMOL/L (ref 134–144)
TRIGL SERPL-MCNC: 95 MG/DL (ref 0–149)
TSH SERPL DL<=0.005 MIU/L-ACNC: 4.29 UIU/ML (ref 0.45–4.5)

## 2020-12-21 PROBLEM — M54.42 ACUTE BILATERAL LOW BACK PAIN WITH LEFT-SIDED SCIATICA: Status: RESOLVED | Noted: 2017-09-27 | Resolved: 2020-12-21

## 2020-12-21 PROBLEM — Z12.5 ENCOUNTER FOR PROSTATE CANCER SCREENING: Status: ACTIVE | Noted: 2020-12-21

## 2020-12-21 PROBLEM — Z00.00 MEDICARE ANNUAL WELLNESS VISIT, SUBSEQUENT: Status: ACTIVE | Noted: 2020-12-21

## 2020-12-21 PROBLEM — Z13.6 SCREENING FOR HYPERTENSION: Status: ACTIVE | Noted: 2020-12-21

## 2020-12-21 PROBLEM — R19.7 DIARRHEA: Status: RESOLVED | Noted: 2018-10-09 | Resolved: 2020-12-21

## 2020-12-21 PROBLEM — Z12.11 COLON CANCER SCREENING: Status: ACTIVE | Noted: 2020-12-21

## 2020-12-21 PROBLEM — Z13.29 SCREENING FOR HYPOTHYROIDISM: Status: ACTIVE | Noted: 2020-12-21

## 2020-12-21 PROBLEM — R10.9 ABDOMINAL CRAMPING: Status: RESOLVED | Noted: 2018-10-19 | Resolved: 2020-12-21

## 2020-12-21 PROBLEM — K58.0 IRRITABLE BOWEL SYNDROME WITH DIARRHEA: Status: ACTIVE | Noted: 2020-12-21

## 2020-12-21 PROBLEM — Z13.220 SCREENING FOR HYPERLIPIDEMIA: Status: ACTIVE | Noted: 2020-12-21

## 2020-12-22 ENCOUNTER — OFFICE VISIT (OUTPATIENT)
Dept: FAMILY MEDICINE CLINIC | Facility: CLINIC | Age: 75
End: 2020-12-22
Payer: MEDICARE

## 2020-12-22 ENCOUNTER — TELEPHONE (OUTPATIENT)
Dept: ADMINISTRATIVE | Facility: OTHER | Age: 75
End: 2020-12-22

## 2020-12-22 VITALS
BODY MASS INDEX: 37.65 KG/M2 | TEMPERATURE: 97.3 F | DIASTOLIC BLOOD PRESSURE: 70 MMHG | OXYGEN SATURATION: 98 % | WEIGHT: 263 LBS | RESPIRATION RATE: 18 BRPM | HEART RATE: 77 BPM | HEIGHT: 70 IN | SYSTOLIC BLOOD PRESSURE: 130 MMHG

## 2020-12-22 DIAGNOSIS — D64.9 ANEMIA, UNSPECIFIED TYPE: ICD-10-CM

## 2020-12-22 DIAGNOSIS — Z13.29 SCREENING FOR HYPOTHYROIDISM: ICD-10-CM

## 2020-12-22 DIAGNOSIS — Z12.5 ENCOUNTER FOR PROSTATE CANCER SCREENING: ICD-10-CM

## 2020-12-22 DIAGNOSIS — Z13.220 SCREENING FOR HYPERLIPIDEMIA: ICD-10-CM

## 2020-12-22 DIAGNOSIS — E11.9 TYPE 2 DIABETES MELLITUS WITHOUT COMPLICATION, WITHOUT LONG-TERM CURRENT USE OF INSULIN (HCC): Primary | ICD-10-CM

## 2020-12-22 DIAGNOSIS — R26.89 BALANCE PROBLEM: ICD-10-CM

## 2020-12-22 DIAGNOSIS — Z12.11 COLON CANCER SCREENING: ICD-10-CM

## 2020-12-22 DIAGNOSIS — Z00.00 MEDICARE ANNUAL WELLNESS VISIT, SUBSEQUENT: ICD-10-CM

## 2020-12-22 DIAGNOSIS — Z13.6 SCREENING FOR HYPERTENSION: ICD-10-CM

## 2020-12-22 DIAGNOSIS — E66.01 OBESITY, MORBID (HCC): ICD-10-CM

## 2020-12-22 DIAGNOSIS — K58.0 IRRITABLE BOWEL SYNDROME WITH DIARRHEA: ICD-10-CM

## 2020-12-22 PROCEDURE — 99215 OFFICE O/P EST HI 40 MIN: CPT | Performed by: FAMILY MEDICINE

## 2020-12-22 PROCEDURE — 93000 ELECTROCARDIOGRAM COMPLETE: CPT | Performed by: FAMILY MEDICINE

## 2020-12-22 PROCEDURE — G0439 PPPS, SUBSEQ VISIT: HCPCS | Performed by: FAMILY MEDICINE

## 2020-12-22 RX ORDER — DAPAGLIFLOZIN 10 MG/1
TABLET, FILM COATED ORAL
COMMUNITY
Start: 2020-11-13 | End: 2020-12-22 | Stop reason: DRUGHIGH

## 2021-01-08 ENCOUNTER — EVALUATION (OUTPATIENT)
Dept: PHYSICAL THERAPY | Facility: CLINIC | Age: 76
End: 2021-01-08
Payer: MEDICARE

## 2021-01-08 DIAGNOSIS — R26.89 BALANCE PROBLEM: Primary | ICD-10-CM

## 2021-01-08 DIAGNOSIS — R26.81 UNSTEADY GAIT: ICD-10-CM

## 2021-01-08 PROCEDURE — 97162 PT EVAL MOD COMPLEX 30 MIN: CPT | Performed by: PHYSICAL THERAPIST

## 2021-01-08 NOTE — PROGRESS NOTES
PT Evaluation     Today's date: 2021  Patient name: Kristi Franks  : 1945  MRN: [id number]  Referring provider: Miya Blackman MD  Dx:   Encounter Diagnosis     ICD-10-CM    1  Balance problem  R26 89 Ambulatory referral to Physical Therapy       Start Time: 4158  Stop Time: 1100  Total time in clinic (min): 38 minutes    Assessment  Assessment details: Kristi Franks is a 70 y o  male here today 21 for an initial physical therapy evaluation due to worsening balance and increased falls  Kristi Franks presents to PT with impairments in functional endurance, anticipatory/protective balance reactions, and decreased functional strength  Kristi Franks has been educated in current PT POC and his personalized short term and long term goals were reviewed  His current 5xSTS score of 16 34 sec suggests increased risk for falls compared to age matched norms  His 10MWT score of 1 1 m/s and 6 MWT of 1148 feet suggests limited functional endurance compared to research guidelines by the [employer]  He also has a history of B TKR in 2016 which may also limit his standing tolerance and strength throughout therapy course  He also presents with impaired protective balance reactions as per noted difficulty when standing on blue airex during mCTSIB and suspected vestibular impairments as per (+) LOB during condition 4 of mCTSIB  He currently reports functional limitations of the following: difficulty rising to/from floor, decreased confidence on unlevel/rough terrain, decreased confidence going up/down stairs  Kristi Franks would benefit from skilled outpatient physical therapy in order to address impairments listed above in order to maximize ability to return to life roles within home and community safely and independently  All questions addressed at this time  Thank you for this referral           Impairments: abnormal gait, abnormal or restricted ROM, impaired balance and safety issue    Symptom irritability: moderateUnderstanding of Dx/Px/POC: excellent   Prognosis: good    Goals  Goals  STG 30 days    Client will improve static balance with feet together Eyes Closed, Foam surface to 30 seconds indicating reduction in fall risk  Client will achieve 190 feet improvement from 1148 feet to 1338 feet during 6 minute walk test which is Minimal Detectable Change pre current research standards with endurance to demonstrate enhance functional capacity     Client will achieve   14 m/sec improvement from 1 1 m/sec to 1 24 m/sec during 10 Meter Walk test, demonstrating Minimal Detectable change per current research standards for this objective test which assesses fall risk  Client will complete Hargrove Balance Scale to further determine risk for falls  Client will be able to complete a Five Timed sit to/from stand within 14 04 sec to suggest reduced risk for falls      LT days   Client will score low risk for falls with 3/4 fall risk measures  Client will be able to ambulate 1500 feet without AD during 6 minute walk test   Client will be able to ambulate outdoors without any loss of balance or reports of fatigue  Client will be able to complete floor to/from stand transfer with independence  Client will demonstrate a score of 48/56 on Hargrove Balance assessment to reflect reduced risk for falls    Client will be able to complete a Five Timed sit to/from stand within 12 6 sec to suggest reduced risk for falls    Cut off score   All date taken from [employer] Neuro Section or Rehab Measures    HARGROVE test: 46/56                                                         5 x STS Test:  MDC: 6 points                                                              MDC: 2 3 seconds   age norms                                                                    Age Norms   61-76 year old = M: 54, F: 54                                         61-76 year old: 11 4 seconds   66-77 year old = M: 47,  F: 52                                        68-69 year old: 12 6 seconds    80-80 year old = M: 48,   F: 53                                       80-80 year old: 14 8 seconds     TUG test:                                                                     10 Meter Walk Test:  MDC: 4 14 seconds                                                      MDC:  14m/sec  Cut off score for Falls                                                  Age Norms  > 13 5 seconds community dwelling adults                20-29; M: 1 39 m/sec F: 1 41 m/sec  > 32 2 Frail Elderly                                                     30-39: M 1 46 m/sec  F: 1 42 m/sec                                                                                       40-49: M: 1 46 m/sec  F: 1 39 m/sec  6 Minute Walk Test                                                      50-59: M: 1 39 m/sec  F: 1 40 m/sec  MDC: 190 feet                                                              60-69: M: 1 36 m/sec  F: 1 30 m/sec  Age Norms                                                                   70-+    M: 1 33 m/sec  F: 1 27 m/sec  60-69:    M: 1876 F: 9741  63-94:    M: 1729 F: 1545  80-89 +: M: 80 F; 1286     Plan  Plan details: Bella Balance, strengthening, higher level dynamic standing balance, fall recovery strategies  Patient would benefit from: PT eval and skilled physical therapy  Planned modality interventions: electrical stimulation/Russian stimulation and biofeedback  Planned therapy interventions: neuromuscular re-education, motor coordination training, therapeutic activities, therapeutic exercise, transfer training, gait training, patient education, strengthening, stretching, manual therapy, activity modification, balance, home exercise program, graded motor, graded activity and graded exercise  Frequency: 2x week  Duration in weeks: 12  Plan of Care beginning date: 1/8/2021  Plan of Care expiration date: 4/2/2021  Treatment plan discussed with: patient        Subjective Evaluation    History of Present Illness  Mechanism of injury: Over the summer, client sustained a fall where he broke 2 ribs  He's been noticing worsening balance with frequent tripping since  He was referred to OPPT by Dr Radha Griggs for his balance  He has pmhx notable for DM, but does not present with any numbness or tingling in his feet legs  He also has a pmhx of B TKR in 2013 and states he "never felt the same since"          Not a recurrent problem   Quality of life: good    Pain  No pain reported    Social Support  Steps to enter house: no  Stairs in house: yes (full flight to bedroom RHR)   Lives in: multiple-level home  Lives with: spouse    Employment status: not working  Hand dominance: right      Diagnostic Tests  No diagnostic tests performed  Treatments  No previous or current treatments  Patient Goals  Patient goals for therapy: improved balance and increased strength          Objective     Functional Assessment        Comments  MMT:  WNL throughout BLE    Coordination:  - intact finger to nose B  - intact alternating toe-tapping    Gait analysis:  - no AD, wide PETE with increase in lateral sway throughout cycle  Intermittent veering  Flowsheet Rows      Most Recent Value   PT/OT G-Codes   Current Score  51   Projected Score  0             Precautions:   Past Medical History:   Diagnosis Date    Diabetes mellitus ([facility] [address] )     Neoplasm of uncertain behavior of skin     Neck-Last assessed 06/13/17           Outcome assessments 1/8/2021  IE         5xSTS 16 34 sec         10MWT 9 09 sec   1 1 m/s         6MWT 1148 feet no AD         mCTSIB EO firm: 30 sec   EC firm: 30 sec (+) sway  EO foam: 30 sec (+) sway  EC foam: 4 5 sec (+) LOB

## 2021-01-13 ENCOUNTER — OFFICE VISIT (OUTPATIENT)
Dept: PHYSICAL THERAPY | Facility: CLINIC | Age: 76
End: 2021-01-13
Payer: MEDICARE

## 2021-01-13 DIAGNOSIS — R26.89 BALANCE PROBLEM: ICD-10-CM

## 2021-01-13 DIAGNOSIS — R26.81 UNSTEADY GAIT: Primary | ICD-10-CM

## 2021-01-13 PROCEDURE — 97116 GAIT TRAINING THERAPY: CPT | Performed by: PHYSICAL THERAPIST

## 2021-01-13 PROCEDURE — 97112 NEUROMUSCULAR REEDUCATION: CPT | Performed by: PHYSICAL THERAPIST

## 2021-01-13 NOTE — PROGRESS NOTES
Daily Note     Today's date: 2021  Patient name: Alan Siddiqui  : 1945  MRN: [id number]  Referring provider: Selvin Armas MD  Dx:   Encounter Diagnosis     ICD-10-CM    1  Unsteady gait  R26 81    2  Balance problem  R26 89        Start Time: 0803  Stop Time: 0845  Total time in clinic (min): 42 minutes    Subjective: Client reports mild knee discomfort (2-3/10)  Objective: See treatment diary below    TE:   - STS 2x10 with 10# medicine ball  - Heelraises 20x (light fingertip balance)    NMR:  - EC, feet together 30 sec x 4  - EO on blue airex with core rotations with 10lbs medicine ball  - FWD 8-inch hurdles w/ foam pads 10 feet x 4  - LAT 8-inch hurdles w/ foam pads 10 feet x 4  - DGI completed (refer to chart below)    Gait:  - Treadmill x 8 minutes at 0 8 mph and 2% incline      Assessment: Client presented to PT with good participation throughout session  Initiated and trained client on various strength, balance and endurance activities with good tolerance  Client required min cues for technique with good carryover  He is also at a borderline risk for falls as per his current performance in DGI, with noted difficulties with walking with head turns, obstacle clearance and obstacle negotiation  Client states having difficulties ascending/descending ramps, therefore this PT initiated use of incline on treadmill with good tolerance  Noted compensations with hurdles and figure-8 throughout cones, likely due to PMH of B TKR  Client will benefit from continued skilled OPPT services in order to maximize functional ability, mobility and participation throughout life roles in home and community  Plan: Continue per plan of care  Precautions:   Past Medical History:   Diagnosis Date    Diabetes mellitus ([facility] [address] )     Neoplasm of uncertain behavior of skin     Neck-Last assessed 17           Outcome assessments 2021  IE 21        5xSTS 16 34 sec         10MWT 9 09 sec   1 1 m/s         6MWT 1148 feet no AD         mCTSIB EO firm: 30 sec   EC firm: 30 sec (+) sway  EO foam: 30 sec (+) sway  EC foam: 4 5 sec (+) LOB         DGI  19/24

## 2021-01-15 ENCOUNTER — OFFICE VISIT (OUTPATIENT)
Dept: PHYSICAL THERAPY | Facility: CLINIC | Age: 76
End: 2021-01-15
Payer: MEDICARE

## 2021-01-15 DIAGNOSIS — R26.81 UNSTEADY GAIT: Primary | ICD-10-CM

## 2021-01-15 DIAGNOSIS — R26.89 BALANCE PROBLEM: ICD-10-CM

## 2021-01-15 PROCEDURE — 97112 NEUROMUSCULAR REEDUCATION: CPT | Performed by: PHYSICAL THERAPIST

## 2021-01-15 PROCEDURE — 97110 THERAPEUTIC EXERCISES: CPT | Performed by: PHYSICAL THERAPIST

## 2021-01-15 NOTE — PROGRESS NOTES
Daily Note     Today's date: 1/15/2021  Patient name: David Hardwick  : 1945  MRN: [id number]  Referring provider: Karen Méndez MD  Dx:   Encounter Diagnosis     ICD-10-CM    1  Unsteady gait  R26 81    2  Balance problem  R26 89        Start Time: 1230  Stop Time: 1315  Total time in clinic (min): 45 minutes    Subjective: Client reports mild knee discomfort (2-3/10)  Objective: See treatment diary below    TE:   - STS 2x10 with 10# medicine ball  - Heelraises 20x (light fingertip balance)  - hamstring stretch, seated with SOS 30 sec x 4  - piriformis stretch, seated 30 sec x 4    NMR:  - EC, feet together 30 sec x 4  - EC, feet together 30 sec x 4 while resisting against pertubations  - EO on blue airex with core rotations with 10lbs medicine ball  - Side stepping along blue foam x 6  - FWD 8-inch hurdles w/ foam pads 10 feet x 4  - LAT 8-inch hurdles w/ foam pads 10 feet x 4  - Walking with alternating marches while holding 8lbs medicine ball 50' x 2  - Tandem walking on blue airex x6 trials    Not today  - Treadmill x 8 minutes at 0 8 mph and 2% incline      Assessment: Client presented to PT with good participation throughout session  Initiated stretching to program today as client reported feeling "stiff" a few days after therapy session  Also initiated higher level dynamic standing balance  This PT provided client with HEP with stretches to aide in flexibility  Client will benefit from continued skilled OPPT services in order to maximize functional ability, mobility and participation throughout life roles in home and community  Plan: Continue per plan of care  Continue with treadmill training with incline  Precautions:   Past Medical History:   Diagnosis Date    Diabetes mellitus ([facility] [address] )     Neoplasm of uncertain behavior of skin     Neck-Last assessed 17           Outcome assessments 2021  IE 21        5xSTS 16 34 sec         10MWT 9 09 sec   1 1 m/s 6MWT 1148 feet no AD         mCTSIB EO firm: 30 sec   EC firm: 30 sec (+) sway  EO foam: 30 sec (+) sway  EC foam: 4 5 sec (+) LOB         DGI  19/24

## 2021-01-20 ENCOUNTER — OFFICE VISIT (OUTPATIENT)
Dept: PHYSICAL THERAPY | Facility: CLINIC | Age: 76
End: 2021-01-20
Payer: MEDICARE

## 2021-01-20 DIAGNOSIS — R26.89 BALANCE PROBLEM: ICD-10-CM

## 2021-01-20 DIAGNOSIS — R26.81 UNSTEADY GAIT: Primary | ICD-10-CM

## 2021-01-20 PROCEDURE — 97110 THERAPEUTIC EXERCISES: CPT | Performed by: PHYSICAL THERAPIST

## 2021-01-20 PROCEDURE — 97112 NEUROMUSCULAR REEDUCATION: CPT | Performed by: PHYSICAL THERAPIST

## 2021-01-20 NOTE — PROGRESS NOTES
Daily Note     Today's date: 2021  Patient name: Nelly Craven  : 1945  MRN: [id number]  Referring provider: Amara Chavis MD  Dx:   Encounter Diagnosis     ICD-10-CM    1  Unsteady gait  R26 81    2  Balance problem  R26 89        Start Time: 1017  Stop Time: 1100  Total time in clinic (min): 43 minutes    Subjective: Client reports mild knee discomfort (2/10)  He denies any falls or changes to medication at this time  Objective: See treatment diary below    TE:   - STS 2x10 with 10# medicine ball  - Heelraises 20x (light fingertip balance)  - hamstring stretch, seated with SOS 30 sec x 4      NMR:  - EC, feet together 30 sec x 4  - EC, feet together 30 sec x 4 while resisting against pertubations  - EO on blue airex with core rotations with 10lbs medicine ball  - Side stepping along blue foam x 6  - FWD 8-inch hurdles w/ foam pads 10 feet x 4  - LAT 8-inch hurdles w/ foam pads 10 feet x 4  - Walking with alternating marches while holding 8lbs medicine ball 50' x 2  - Tandem walking on blue airex x6 trials    Not today  - Treadmill x 8 minutes at 0 8 mph and 2% incline  - piriformis stretch, seated 30 sec x 4 (HEP)    Assessment: Client presented to PT with good participation throughout session  As per client report, he denied any "stiffness" over the past few days and confirms compliance with stretching  Subjective reports of improvements in reciprocal pattern on steps  Continue to progress higher level dynamic standing balance and endurance  Client will benefit from continued skilled OPPT services in order to maximize functional ability, mobility and participation throughout life roles in home and community  Plan: Continue per plan of care  Continue with treadmill training with incline       Precautions:   Past Medical History:   Diagnosis Date    Diabetes mellitus ([address] )     Neoplasm of uncertain behavior of skin     Neck-Last assessed 17           Outcome assessments 1/8/2021  IE 1/13/21        5xSTS 16 34 sec         10MWT 9 09 sec   1 1 m/s         6MWT 1148 feet no AD         mCTSIB EO firm: 30 sec   EC firm: 30 sec (+) sway  EO foam: 30 sec (+) sway  EC foam: 4 5 sec (+) LOB         DGI  19/24

## 2021-01-22 ENCOUNTER — OFFICE VISIT (OUTPATIENT)
Dept: PHYSICAL THERAPY | Facility: CLINIC | Age: 76
End: 2021-01-22
Payer: MEDICARE

## 2021-01-22 DIAGNOSIS — R26.81 UNSTEADY GAIT: Primary | ICD-10-CM

## 2021-01-22 DIAGNOSIS — R26.89 BALANCE PROBLEM: ICD-10-CM

## 2021-01-22 PROCEDURE — 97112 NEUROMUSCULAR REEDUCATION: CPT | Performed by: PHYSICAL THERAPIST

## 2021-01-22 PROCEDURE — 97110 THERAPEUTIC EXERCISES: CPT | Performed by: PHYSICAL THERAPIST

## 2021-01-22 NOTE — PROGRESS NOTES
Daily Note     Today's date: 2021  Patient name: Piedad Mondragon  : 1945  MRN: [id number]  Referring provider: Haydee Miranda MD  Dx:   Encounter Diagnosis     ICD-10-CM    1  Unsteady gait  R26 81    2  Balance problem  R26 89        Start Time: 1015  Stop Time: 1100  Total time in clinic (min): 45 minutes    Subjective: Client reports mild knee discomfort (/10)  He denies any falls or changes to medication at this time  Objective: See treatment diary below    TE:   - STS 2x10 with 10# medicine ball  - Heelraises 20x (light fingertip balance)  - 3 way hip with 2# ankle weights 3 x10      NMR:  - EC, feet together 30 sec x 4  - EC, feet together 30 sec x 4 while resisting against pertubations  - Side stepping along blue foam x 6  - FWD 8-inch hurdles w/ foam pads 10 feet x 4  - LAT 8-inch hurdles w/ foam pads 10 feet x 4  - Walking with alternating marches while holding 8lbs medicine ball 50' x 2  - Tandem walking on blue airex x6 trials    Not today  - Treadmill x 8 minutes at 0 8 mph and 2% incline  - piriformis stretch, seated 30 sec x 4 (HEP)  - EO on blue airex with core rotations with 10lbs medicine ball    Assessment: Client presented to PT with good participation throughout session  Client denies feeling "stiff" after session  Client will benefit from continued skilled OPPT services in order to maximize functional ability, mobility and participation throughout life roles in home and community  Plan: Continue per plan of care  Continue with treadmill training with incline  Precautions:   Past Medical History:   Diagnosis Date    Diabetes mellitus ([facility] [address] )     Neoplasm of uncertain behavior of skin     Neck-Last assessed 17           Outcome assessments 2021  IE 21        5xSTS 16 34 sec         10MWT 9 09 sec   1 1 m/s         6MWT 1148 feet no AD         mCTSIB EO firm: 30 sec   EC firm: 30 sec (+) sway  EO foam: 30 sec (+) sway  EC foam: 4 5 sec (+) LOB DGI  19/24

## 2021-01-27 ENCOUNTER — OFFICE VISIT (OUTPATIENT)
Dept: PHYSICAL THERAPY | Facility: CLINIC | Age: 76
End: 2021-01-27
Payer: MEDICARE

## 2021-01-27 DIAGNOSIS — R26.89 BALANCE PROBLEM: ICD-10-CM

## 2021-01-27 DIAGNOSIS — R26.81 UNSTEADY GAIT: Primary | ICD-10-CM

## 2021-01-27 PROCEDURE — 97112 NEUROMUSCULAR REEDUCATION: CPT

## 2021-01-27 NOTE — PROGRESS NOTES
Daily Note     Today's date: 2021  Patient name: Nathan Mittal  : 1945  MRN: [id number]  Referring provider: Jeovanny Edmonds MD  Dx:   Encounter Diagnosis     ICD-10-CM    1  Unsteady gait  R26 81    2  Balance problem  R26 89        Start Time: 1145  Stop Time: 1230  Total time in clinic (min): 45 minutes    Subjective: Pt does not report any complaints prior to session  Objective: See treatment diary below    TE:   - STS 2x10 with 10# medicine ball  - Heelraises 20x (light fingertip balance)  - 3 way hip with 2# ankle weights 3 x10      NMR:  - EC, feet together foam 30 sec x 4  - EC, feet together 30 sec x 4 while resisting against pertubations  - Side stepping along blue foam x 6  - FWD 8-inch hurdles w/ foam pads 10 feet x 4  - LAT 8-inch hurdles w/ foam pads 10 feet x 4  - Walking with alternating marches while holding 8lbs medicine ball 50' x 2  - Tandem walking on blue airex x6 trials    Not today  - Treadmill x 8 minutes at 0 8 mph and 2% incline  - piriformis stretch, seated 30 sec x 4 (HEP)  - EO on blue airex with core rotations with 10lbs medicine ball    Assessment: Client presented to PT with good participation throughout session  Demo good tolerance with perturbations, no LOB  Client will benefit from continued skilled OPPT services in order to maximize functional ability, mobility and participation throughout life roles in home and community  Plan: Continue per plan of care  Continue with treadmill training with incline  Precautions:   Past Medical History:   Diagnosis Date    Diabetes mellitus ([facility] [address] )     Neoplasm of uncertain behavior of skin     Neck-Last assessed 17           Outcome assessments 2021  IE 21        5xSTS 16 34 sec         10MWT 9 09 sec   1 1 m/s         6MWT 1148 feet no AD         mCTSIB EO firm: 30 sec   EC firm: 30 sec (+) sway  EO foam: 30 sec (+) sway  EC foam: 4 5 sec (+) LOB         DGI

## 2021-01-29 ENCOUNTER — OFFICE VISIT (OUTPATIENT)
Dept: PHYSICAL THERAPY | Facility: CLINIC | Age: 76
End: 2021-01-29
Payer: MEDICARE

## 2021-01-29 DIAGNOSIS — R26.81 UNSTEADY GAIT: Primary | ICD-10-CM

## 2021-01-29 DIAGNOSIS — R26.89 BALANCE PROBLEM: ICD-10-CM

## 2021-01-29 PROCEDURE — 97112 NEUROMUSCULAR REEDUCATION: CPT | Performed by: PHYSICAL THERAPIST

## 2021-01-29 PROCEDURE — 97110 THERAPEUTIC EXERCISES: CPT | Performed by: PHYSICAL THERAPIST

## 2021-01-29 NOTE — PROGRESS NOTES
Daily Note   IE: 21    Today's date: 2021  Patient name: Radha Ac  : 1945  MRN: [id number]  Referring provider: Narendra Shaffer MD  Dx:   Encounter Diagnosis     ICD-10-CM    1  Unsteady gait  R26 81    2  Balance problem  R26 89        Start Time: 0930  Stop Time: 1022  Total time in clinic (min): 52 minutes    Subjective: Client denies any falls or changes to medication      Objective: See treatment diary below    TE:   - STS 2x10 with 10# medicine ball  - Heelraises 20x (light fingertip balance)  - 3 way hip with 2# ankle weights 2 x10      NMR:  - EC, feet together foam 30 sec x 4  - EC, feet together 30 sec x 4 while resisting against pertubations  - Side stepping along blue foam x 6  - FWD 8-inch hurdles w/ foam pads 10 feet x 4  - LAT 8-inch hurdles w/ foam pads 10 feet x 4  - Walking with alternating marches while holding 10 lbs medicine ball 50' x 2  - Tandem walking on blue airex x6 trials  - Dynamic gait drills 50' x 7  - EO on blue airex with core rotations with 10lbs medicine ball  - Standing ball slams with 10lbs medicine ball 20x with intermittent periods of posterior LOB  Gait Training:  - 10 min on [employer] treadmill at 3% incline at 1 5 mph    Not today  - piriformis stretch, seated 30 sec x 4 (HEP)      Assessment: Client presented to PT with good participation throughout session  He was agreeable to stay longer today  Initiated higher level dynamic gait activities as well as higher level balance training  Also progressed gait training trials from 8 to 10 minutes and increased incline % and gait speed with good tolerance  Client will benefit from continued skilled OPPT services in order to maximize functional ability, mobility and participation throughout life roles in home and community  Plan: Continue per plan of care  Continue with treadmill training with incline       Precautions:   Past Medical History:   Diagnosis Date    Diabetes mellitus ([facility] [address] )     Neoplasm of uncertain behavior of skin     Neck-Last assessed 06/13/17           Outcome assessments 1/8/2021  IE 1/13/21        5xSTS 16 34 sec         10MWT 9 09 sec   1 1 m/s         6MWT 1148 feet no AD         mCTSIB EO firm: 30 sec   EC firm: 30 sec (+) sway  EO foam: 30 sec (+) sway  EC foam: 4 5 sec (+) LOB         DGI  19/24

## 2021-02-03 ENCOUNTER — OFFICE VISIT (OUTPATIENT)
Dept: PHYSICAL THERAPY | Facility: CLINIC | Age: 76
End: 2021-02-03
Payer: MEDICARE

## 2021-02-03 DIAGNOSIS — R26.89 BALANCE PROBLEM: ICD-10-CM

## 2021-02-03 DIAGNOSIS — R26.81 UNSTEADY GAIT: Primary | ICD-10-CM

## 2021-02-03 PROCEDURE — 97112 NEUROMUSCULAR REEDUCATION: CPT | Performed by: PHYSICAL THERAPIST

## 2021-02-03 PROCEDURE — 97110 THERAPEUTIC EXERCISES: CPT | Performed by: PHYSICAL THERAPIST

## 2021-02-03 NOTE — PROGRESS NOTES
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  Objective: See treatment diary below    TE:   - STS 2x10 with 10# medicine ball  - Heelraises 20x (light fingertip balance)  - 3 way hip with 2# ankle weights 2 x10      NMR:  - EC, feet together foam 30 sec x 4  - feet together 30 sec x 4 while resisting against pertubations, EOx1, ECx2  - Side stepping along blue foam x 10  - FWD 8-inch hurdles w/ foam pads 10 feet x 4  - LAT 8-inch hurdles w/ foam pads 10 feet x 4  - Tandem walking on blue airex x6 trials  - Walking with alternating hand to knee marches 50' x 2      NOT PERFORMED:   - Dynamic gait drills 50' x 7  - EO on blue airex with core rotations with 10lbs medicine ball  - Standing ball slams with 10lbs medicine ball 20x with intermittent periods of posterior LOB  - 10 min on [employer] treadmill at 3% incline at 1 5 mph  - piriformis stretch, seated 30 sec x 4 (HEP)      Assessment: Increased rest breaks taken today due to fatigue from yesterday  Most challenged w/ patrick negotiation onto foam pads, mainly needing cues to slow down his speed  Client will benefit from continued skilled OPPT services in order to maximize functional ability, mobility and participation throughout life roles in home and community  Plan: Continue per plan of care  Continue with treadmill training with incline  Precautions:   Past Medical History:   Diagnosis Date    Diabetes mellitus ([address] )     Neoplasm of uncertain behavior of skin     Neck-Last assessed 17           Outcome assessments 2021  IE 21        5xSTS 16 34 sec         10MWT 9 09 sec   1 1 m/s         6MWT 1148 feet no AD         mCTSIB EO firm: 30 sec   EC firm: 30 sec (+) sway  EO foam: 30 sec (+) sway  EC foam: 4 5 sec (+) LOB         DGI  19/24

## 2021-02-05 ENCOUNTER — OFFICE VISIT (OUTPATIENT)
Dept: PHYSICAL THERAPY | Facility: CLINIC | Age: 76
End: 2021-02-05
Payer: MEDICARE

## 2021-02-05 DIAGNOSIS — R26.89 BALANCE PROBLEM: ICD-10-CM

## 2021-02-05 DIAGNOSIS — R26.81 UNSTEADY GAIT: Primary | ICD-10-CM

## 2021-02-05 PROCEDURE — 97110 THERAPEUTIC EXERCISES: CPT | Performed by: PHYSICAL THERAPIST

## 2021-02-05 PROCEDURE — 97116 GAIT TRAINING THERAPY: CPT | Performed by: PHYSICAL THERAPIST

## 2021-02-05 PROCEDURE — 97112 NEUROMUSCULAR REEDUCATION: CPT | Performed by: PHYSICAL THERAPIST

## 2021-02-05 NOTE — PROGRESS NOTES
Daily Note   IE: 21  Needs progress note next session    Today's date: 2021  Patient name: Nathan Mittal  : 1945  MRN: [id number]  Referring provider: Jeovanny Edmonds MD  Dx:   Encounter Diagnosis     ICD-10-CM    1  Unsteady gait  R26 81    2  Balance problem  R26 89        Start Time: 1016  Stop Time: 1100  Total time in clinic (min): 44 minutes    Subjective: Client reports having an allergic reaction earlier this morning with his throat "getting tight" and his "eyes watering " He states he is feeling much better and denies any difficulty eating at this time  Objective: See treatment diary below    TE:   - STS 2x10 with 10# medicine ball  - Heelraises 20x (light fingertip balance)  - 3 way hip with 3# ankle weights 2 x10      NMR:  - EC, feet together foam 30 sec x 4  - SLS on blue airex 10 sec hold x 10 (each LE) with incidental UE assist    - Standing core rotations on blue airex 20x each way  - Standing high-knee marches on blue airex while holding 10 LBS medicine ball  - Side stepping along blue foam x 10  - Tandem walking on blue airex x6 trials  - Walking with alternating hand to knee marches 50' x 2    GAIT:  - 10 min on [employer] treadmill at 3% incline at 1 5 mph    NOT PERFORMED:   - Dynamic gait drills 50' x 7  - EO on blue airex with core rotations with 10lbs medicine ball  - Standing ball slams with 10lbs medicine ball 20x with intermittent periods of posterior LOB  - piriformis stretch, seated 30 sec x 4 (HEP)  - FWD 8-inch hurdles w/ foam pads 10 feet x 4  - LAT 8-inch hurdles w/ foam pads 10 feet x 4    Assessment: Client with good participation throughout session  Initiated any trained on standing marches on blue airex while holds 10LBS medicine ball  This PT progressed his strengthening exercises to 3# compared to 2# today with good tolerance  Client verbalized good understanding to seek higher level care if his allergy reaction worsens   Client will benefit from continued skilled OPPT services in order to maximize functional ability, mobility and participation throughout life roles in home and community  Plan: Continue per plan of care  Continue with treadmill training with incline  Progress note next session  Precautions:   Past Medical History:   Diagnosis Date    Diabetes mellitus ([facility] [address] )     Neoplasm of uncertain behavior of skin     Neck-Last assessed 06/13/17           Outcome assessments 1/8/2021  IE 1/13/21        5xSTS 16 34 sec         10MWT 9 09 sec   1 1 m/s         6MWT 1148 feet no AD         mCTSIB EO firm: 30 sec   EC firm: 30 sec (+) sway  EO foam: 30 sec (+) sway  EC foam: 4 5 sec (+) LOB         DGI  19/24

## 2021-02-10 ENCOUNTER — OFFICE VISIT (OUTPATIENT)
Dept: PHYSICAL THERAPY | Facility: CLINIC | Age: 76
End: 2021-02-10
Payer: MEDICARE

## 2021-02-10 DIAGNOSIS — R26.81 UNSTEADY GAIT: Primary | ICD-10-CM

## 2021-02-10 DIAGNOSIS — R26.89 BALANCE PROBLEM: ICD-10-CM

## 2021-02-10 PROCEDURE — 97530 THERAPEUTIC ACTIVITIES: CPT

## 2021-02-10 NOTE — PROGRESS NOTES
PT Re-Evaluation /Progress Note    Today's date: 2/10/2021  Patient name: Cassandra Bartlett  : 1945  MRN: [id number]  Referring provider: Gregor Horne MD  Dx:   Encounter Diagnosis     ICD-10-CM    1  Unsteady gait  R26 81    2  Balance problem  R26 89                   Assessment  Assessment details: Patient is a 71 y/o male who has been reporting to skilled outpatient PT due to worsening balance and increased falls impacting his ability to perform ADLs and ambulation safely  He has demonstrated significant improvements as indicated by scores associated with 5xSTS, 10 meter, and FGA with results depicting low risk for falls per cut off scores taken from APTA and Rehab Measures  He displayed improvement in functional endurance per 6 Minute Walk score, however remains below age matched normative values  Improving somatosensory awareness per results of mCTSIB, however with remaining postural sway in compliant surface with eyes closed  Patient subjective reporting of remaining difficulties with performing floor to stand transfers and stair negotiation with plan to address these impairments as well as dynamic balance challenges moving forward to maximize his safety  He has met 3/4 short term goals today with good progression towards remaining set ones  He will continue to benefit from skilled outpatient PT in order to maximize his function and reduce his overall risk for falls            Impairments: abnormal gait, abnormal or restricted ROM, impaired balance and safety issue    Symptom irritability: moderateUnderstanding of Dx/Px/POC: excellent   Prognosis: good    Goals  Goals  STG 30 days    Client will improve static balance with feet together Eyes Closed, Foam surface to 30 seconds indicating reduction in fall risk - MET  Client will achieve 190 feet improvement from 1148 feet to 1338 feet during 6 minute walk test which is Minimal Detectable Change pre current research standards with endurance to demonstrate enhance functional capacity - NOT MET  Client will achieve   14 m/sec improvement from 1 1 m/sec to 1 24 m/sec during 10 Meter Walk test, demonstrating Minimal Detectable change per current research standards for this objective test which assesses fall risk  - MET  Client will complete Hargrove Balance Scale to further determine risk for falls - NOT TESTED  Client will be able to complete a Five Timed sit to/from stand within 14 04 sec to suggest reduced risk for falls - MET      LT days   Client will score low risk for falls with 3/4 fall risk measures  Client will be able to ambulate 1500 feet without AD during 6 minute walk test   Client will be able to ambulate outdoors without any loss of balance or reports of fatigue  Client will be able to complete floor to/from stand transfer with independence  Client will demonstrate a score of 48/56 on Hargrove Balance assessment to reflect reduced risk for falls    Client will be able to complete a Five Timed sit to/from stand within 12 6 sec to suggest reduced risk for falls    Cut off score   All date taken from APTA Neuro Section or Rehab Measures    HARGROVE test: 46/56                                                         5 x STS Test:  MDC: 6 points                                                              MDC: 2 3 seconds   age norms                                                                    Age Norms   61-76 year old = M: 54, F: 54                                         61-76 year old: 11 4 seconds   66-77 year old = M: 47,  F: 52                                        68-69 year old: 12 6 seconds    80-80 year old = M: 48,   F: 52                                        68-69 year old: 14 8 seconds     TUG test:                                                                     10 Meter Walk Test:  MDC: 4 14 seconds                                                      MDC:  14m/sec  Cut off score for Falls Age Norms  > 13 5 seconds community dwelling adults                20-29; M: 1 39 m/sec F: 1 41 m/sec  > 32 2 Frail Elderly                                                     30-39: M 1 46 m/sec  F: 1 42 m/sec                                                                                       40-49: M: 1 46 m/sec  F: 1 39 m/sec  6 Minute Walk Test                                                      50-59: M: 1 39 m/sec  F: 1 40 m/sec  MDC: 190 feet                                                              60-69: M: 1 36 m/sec  F: 1 30 m/sec  Age Norms                                                                   70-+    M: 1 33 m/sec  F: 1 27 m/sec  60-69:    M: 1876 F: 7144  05-12:    M: 1729 F: 1545  80-89 +: M: 80 F; 1286     Plan  Plan details: Bella Balance, strengthening, higher level dynamic standing balance, fall recovery strategies  Patient would benefit from: PT eval and skilled physical therapy  Planned modality interventions: electrical stimulation/Russian stimulation and biofeedback  Planned therapy interventions: neuromuscular re-education, motor coordination training, therapeutic activities, therapeutic exercise, transfer training, gait training, patient education, strengthening, stretching, manual therapy, activity modification, balance, home exercise program, graded motor, graded activity and graded exercise  Frequency: 2x week  Duration in weeks: 12  Plan of Care beginning date: 1/8/2021  Plan of Care expiration date: 4/2/2021  Treatment plan discussed with: patient        Subjective Evaluation    History of Present Illness  Mechanism of injury: Over the summer, client sustained a fall where he broke 2 ribs  He's been noticing worsening balance with frequent tripping since  He was referred to OPPT by Dr Gonzalez Sensor for his balance  He has pmhx notable for DM, but does not present with any numbness or tingling in his feet legs   He also has a pmhx of B TKR in 2013 and states he "never felt the same since"    Updated (2-): Patient reported he has been feeling pretty good with no new falls  He stated he would like to focus on floor to stand transfers and stair negotiation moving forward  Not a recurrent problem   Quality of life: good    Pain  No pain reported    Social Support  Steps to enter house: no  Stairs in house: yes (full flight to bedroom RHR)   Lives in: multiple-level home  Lives with: spouse    Employment status: not working  Hand dominance: right      Diagnostic Tests  No diagnostic tests performed  Treatments  No previous or current treatments  Patient Goals  Patient goals for therapy: improved balance and increased strength          Objective     Functional Assessment        Comments  MMT:  WNL throughout BLE    Coordination:  - intact finger to nose B  - intact alternating toe-tapping    Gait analysis:  - no AD, wide PETE with increase in lateral sway throughout cycle  Intermittent veering  Precautions:   Past Medical History:   Diagnosis Date    Diabetes mellitus ([address] )     Neoplasm of uncertain behavior of skin     Neck-Last assessed 06/13/17           Outcome assessments 1/8/2021  IE PN  2-        5xSTS 16 34 sec 12 25 sec        10MWT 9 09 sec   1 1 m/s 7 45 sec   1 34 m/s        6MWT 1148 feet no AD 1205 ft no AD        mCTSIB EO firm: 30 sec   EC firm: 30 sec (+) sway  EO foam: 30 sec (+) sway  EC foam: 4 5 sec (+) LOB 30 sec  30 sec  30 sec  30 sec (+)        FGA  25/30

## 2021-02-11 ENCOUNTER — OFFICE VISIT (OUTPATIENT)
Dept: PHYSICAL THERAPY | Facility: CLINIC | Age: 76
End: 2021-02-11
Payer: MEDICARE

## 2021-02-11 DIAGNOSIS — R26.89 BALANCE PROBLEM: ICD-10-CM

## 2021-02-11 DIAGNOSIS — R26.81 UNSTEADY GAIT: Primary | ICD-10-CM

## 2021-02-11 PROCEDURE — 97116 GAIT TRAINING THERAPY: CPT | Performed by: PHYSICAL THERAPIST

## 2021-02-11 PROCEDURE — 97110 THERAPEUTIC EXERCISES: CPT | Performed by: PHYSICAL THERAPIST

## 2021-02-11 PROCEDURE — 97112 NEUROMUSCULAR REEDUCATION: CPT | Performed by: PHYSICAL THERAPIST

## 2021-02-11 NOTE — PROGRESS NOTES
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 [place]  He was agreeable to stay for a 60 minute session today  Objective: See treatment diary below    TE:   - STS x 1 minute with 10# medicine ball  - 3 way hip with 3# ankle weights 2 x10  - piriformis stretch, seated 30 sec x 4  - Seated hamstring stretch 30 sec x 4    NMR:  - EC, feet together foam 30 sec x 4  - SLS on blue airex 10 sec hold x 10 (each LE) with incidental UE assist    - Standing core rotations while holding on 10 lbs medicine ball on blue airex 20x each way  - FWD 8-inch hurdles w/ foam pads 10 feet x 4  - LAT 8-inch hurdles w/ foam pads 10 feet x 4  - Tandem walking FWD/BWD on blue airex x6 trials  - Agility ladder drills x 6 length of ladder     GAIT:  - 10 min on [employer] treadmill at 3% incline at 1 7 mph    NOT PERFORMED:   - Dynamic gait drills 50' x 7  - Standing ball slams with 10lbs medicine ball 20x with intermittent periods of posterior LOB  - Walking with alternating hand to knee marches 50' x 2    Assessment: Client with good participation throughout session  Initiated any trained on standing marches on blue airex while holds 10LBS medicine ball  This PT progressed his strengthening exercises to 3# compared to 2# today with good tolerance  Client verbalized good understanding to seek higher level care if his allergy reaction worsens  Client will benefit from continued skilled OPPT services in order to maximize functional ability, mobility and participation throughout life roles in home and community  Plan: Continue per plan of care  Continue with treadmill training with incline  Progress note next session  Precautions:   Past Medical History:   Diagnosis Date    Diabetes mellitus ([address] )     Neoplasm of uncertain behavior of skin     Neck-Last assessed 06/13/17             Outcome assessments 1/8/2021  IE PN  2-        5xSTS 16 34 sec 12 25 sec        10MWT 9 09 sec   1 1 m/s 7 45 sec   1 34 m/s        6MWT 1148 feet no AD 1205 ft no AD        mCTSIB EO firm: 30 sec   EC firm: 30 sec (+) sway  EO foam: 30 sec (+) sway  EC foam: 4 5 sec (+) LOB 30 sec  30 sec  30 sec  30 sec (+)        FGA

## 2021-02-17 ENCOUNTER — OFFICE VISIT (OUTPATIENT)
Dept: PHYSICAL THERAPY | Facility: CLINIC | Age: 76
End: 2021-02-17
Payer: MEDICARE

## 2021-02-17 DIAGNOSIS — R26.81 UNSTEADY GAIT: Primary | ICD-10-CM

## 2021-02-17 DIAGNOSIS — R26.89 BALANCE PROBLEM: ICD-10-CM

## 2021-02-17 PROCEDURE — 97112 NEUROMUSCULAR REEDUCATION: CPT

## 2021-02-17 PROCEDURE — 97110 THERAPEUTIC EXERCISES: CPT

## 2021-02-17 PROCEDURE — 97116 GAIT TRAINING THERAPY: CPT

## 2021-02-17 NOTE — PROGRESS NOTES
Daily Note   PN: 2/10/21      Today's date: 2021  Patient name: Js Trinidad  : 1945  MRN: [id number]  Referring provider: Jame Spain MD  Dx:   Encounter Diagnosis     ICD-10-CM    1  Unsteady gait  R26 81    2  Balance problem  R26 89                   Subjective: Patient reports feeling a little tired today, with no other changes from previous visit  Objective: See treatment diary below    TE:   - STS x 1 minute with 10# medicine ball  - 3 way hip with 3# ankle weights 2 x10  - piriformis stretch, seated 30 sec x 4  - Seated hamstring stretch 30 sec x 4    NMR:  - EC, feet together foam 30 sec x 4  - Fwd/lat lunges onto BOSU: 20 reps B/L, 3# ankle wts  - Standing core rotations while holding on 10 lbs medicine ball on blue airex w/ FT: 20x each way    GAIT:  - 8 min on [employer] treadmill at 3% incline at 1 5 mph, 3# ankle wts      Assessment: Patient able to tolerate treatment session well today  Continued balance exercises on compliant surfaces and added ankle weights to ambulation on treadmill to improve hip flexion and heel strike, however required PT verbal cues to correct  Plan to incorporate stair negotiation and floor to stand next session  He will continue to benefit from skilled outpatient PT in order to maximize functional ability, mobility and participation throughout life roles in home and community  Plan: Continue per plan of care  Continue with treadmill training with incline  Precautions:   Past Medical History:   Diagnosis Date    Diabetes mellitus ([address] )     Neoplasm of uncertain behavior of skin     Neck-Last assessed 17             Outcome assessments 2021  IE PN  2-        5xSTS 16 34 sec 12 25 sec        10MWT 9 09 sec   1 1 m/s 7 45 sec   1 34 m/s        6MWT 1148 feet no AD 1205 ft no AD        mCTSIB EO firm: 30 sec   EC firm: 30 sec (+) sway  EO foam: 30 sec (+) sway  EC foam: 4 5 sec (+) LOB 30 sec  30 sec  30 sec  30 sec (+) FGA

## 2021-02-19 ENCOUNTER — APPOINTMENT (OUTPATIENT)
Dept: PHYSICAL THERAPY | Facility: CLINIC | Age: 76
End: 2021-02-19
Payer: MEDICARE

## 2021-02-24 ENCOUNTER — OFFICE VISIT (OUTPATIENT)
Dept: PHYSICAL THERAPY | Facility: CLINIC | Age: 76
End: 2021-02-24
Payer: MEDICARE

## 2021-02-24 DIAGNOSIS — R26.81 UNSTEADY GAIT: Primary | ICD-10-CM

## 2021-02-24 DIAGNOSIS — R26.89 BALANCE PROBLEM: ICD-10-CM

## 2021-02-24 PROCEDURE — 97112 NEUROMUSCULAR REEDUCATION: CPT | Performed by: PHYSICAL THERAPIST

## 2021-02-24 PROCEDURE — 97110 THERAPEUTIC EXERCISES: CPT | Performed by: PHYSICAL THERAPIST

## 2021-02-24 PROCEDURE — 97530 THERAPEUTIC ACTIVITIES: CPT | Performed by: PHYSICAL THERAPIST

## 2021-02-24 NOTE — PROGRESS NOTES
Daily Note   PN: 2/10/21      Today's date: 2021  Patient name: Bolivar Morin  : 1945  MRN: [id number]  Referring provider: Diana Ernst MD  Dx:   Encounter Diagnosis     ICD-10-CM    1  Unsteady gait  R26 81    2  Balance problem  R26 89                   Subjective: Patient reports being a little tired since yesterday and not sure reason why  Feels it might be from plowing snow  Objective: See treatment diary below    TE:   - STS x 1 minute with 10# medicine ball  - 3 way hip with 3# ankle weights 2 x10  - piriformis stretch, seated 30 sec x 4  - Seated hamstring stretch 30 sec x 4    NMR:  - EC, feet together foam 30 sec x 3  - Fwd/lat lunges onto BOSU: 20 reps B/L, 3# ankle wts  - Standing core rotations while holding on 10 lbs medicine ball on blue airex w/ FT: 20x each way    TA:  - 10 min on [employer] treadmill at 3% incline at 1 5 mph, 3# ankle wts, HR:       Assessment:   Held floor to stand due to patient request thist date, due to fatigue end of session  Improvement in ambulation with TM with heel strike with biofeedback via treadmill  Improvement in stability with BOSU exercises with decrease in postural instability  Improvement in reactive balance and postural with FTEC on foam     He will continue to benefit from skilled outpatient PT in order to maximize functional ability, mobility and participation throughout life roles in home and community  Plan: Continue per plan of care  Continue with treadmill training with incline  Precautions:   Past Medical History:   Diagnosis Date    [address] mellitus ([facility] [address] )     Neoplasm of uncertain behavior of skin     Neck-Last assessed 17             Outcome assessments 2021  IE PN  2-        5xSTS 16 34 sec 12 25 sec        10MWT 9 09 sec   1 1 m/s 7 45 sec   1 34 m/s        6MWT 1148 feet no AD 1205 ft no AD        mCTSIB EO firm: 30 sec   EC firm: 30 sec (+) sway  EO foam: 30 sec (+) sway  EC foam: 4 5 sec (+) LOB 30 sec  30 sec  30 sec  30 sec (+)        FGA

## 2021-02-26 ENCOUNTER — OFFICE VISIT (OUTPATIENT)
Dept: PHYSICAL THERAPY | Facility: CLINIC | Age: 76
End: 2021-02-26
Payer: MEDICARE

## 2021-02-26 DIAGNOSIS — R26.81 UNSTEADY GAIT: Primary | ICD-10-CM

## 2021-02-26 DIAGNOSIS — R26.89 BALANCE PROBLEM: ICD-10-CM

## 2021-02-26 PROCEDURE — 97112 NEUROMUSCULAR REEDUCATION: CPT | Performed by: PHYSICAL THERAPIST

## 2021-02-26 PROCEDURE — 97110 THERAPEUTIC EXERCISES: CPT | Performed by: PHYSICAL THERAPIST

## 2021-02-26 NOTE — PROGRESS NOTES
Daily Note   PN: 2/10/21      Today's date: 2021  Patient name: Amina Rashid  : 1945  MRN: [id number]  Referring provider: Sherlyn Rodriguez MD  Dx:   Encounter Diagnosis     ICD-10-CM    1  Unsteady gait  R26 81    2  Balance problem  R26 89                   Subjective: Patient reports no new changes or falls since most recent visit  Objective: See treatment diary below    TE:   - STS x 1 minute with 10# medicine ball  - 3 way hip with 3# ankle weights 3 x10  - piriformis stretch, seated 30 sec x 4  - Seated hamstring stretch 30 sec x 4    NMR:  - EC, feet together foam 30 sec x 3  - Fwd/lat lunges onto BOSU: 20 reps B/L, 3# ankle wts  - Standing core rotations while holding on 10 lbs medicine ball on blue airex w/ FT: 20x each way  - Foam BB tandem walking - 5 laps  - Foam BB lateral walking- 5 laps  - Walking forwards/ backwards in hallway- PT calling randomly    TA: Not performed today 21 secondary to treadmill being used by another patient  - 10 min on [employer] treadmill at 3% incline at 1 5 mph, 3# ankle wts, HR:       Assessment:   Held treadmill today as was being utilized at the time by another patient, resume as able next visit  Patient demonstrating minimal instability with foam BB exercises performed today  Performed walking forwards/ backwards today with good stability observed overall  He will continue to benefit from skilled outpatient PT in order to maximize functional ability, mobility and participation throughout life roles in home and community  Plan: Continue per plan of care  Continue with treadmill training with incline  Precautions:   Past Medical History:   Diagnosis Date    Diabetes mellitus ([address] )     Neoplasm of uncertain behavior of skin     Neck-Last assessed 17             Outcome assessments 2021  IE PN  2-        5xSTS 16 34 sec 12 25 sec        10MWT 9 09 sec   1 1 m/s 7 45 sec   1 34 m/s        6MWT 1148 feet no AD 1205 ft no AD mCTSIB EO firm: 30 sec   EC firm: 30 sec (+) sway  EO foam: 30 sec (+) sway  EC foam: 4 5 sec (+) LOB 30 sec  30 sec  30 sec  30 sec (+)        FGA

## 2021-03-01 ENCOUNTER — IMMUNIZATIONS (OUTPATIENT)
Dept: FAMILY MEDICINE CLINIC | Facility: HOSPITAL | Age: 76
End: 2021-03-01

## 2021-03-01 DIAGNOSIS — Z23 ENCOUNTER FOR IMMUNIZATION: Primary | ICD-10-CM

## 2021-03-01 PROCEDURE — 0001A SARS-COV-2 / COVID-19 MRNA VACCINE (PFIZER-BIONTECH) 30 MCG: CPT

## 2021-03-01 PROCEDURE — 91300 SARS-COV-2 / COVID-19 MRNA VACCINE (PFIZER-BIONTECH) 30 MCG: CPT

## 2021-03-03 ENCOUNTER — OFFICE VISIT (OUTPATIENT)
Dept: PHYSICAL THERAPY | Facility: CLINIC | Age: 76
End: 2021-03-03
Payer: MEDICARE

## 2021-03-03 DIAGNOSIS — R26.89 BALANCE PROBLEM: ICD-10-CM

## 2021-03-03 DIAGNOSIS — R26.81 UNSTEADY GAIT: Primary | ICD-10-CM

## 2021-03-03 PROCEDURE — 97112 NEUROMUSCULAR REEDUCATION: CPT | Performed by: PHYSICAL THERAPIST

## 2021-03-03 PROCEDURE — 97530 THERAPEUTIC ACTIVITIES: CPT | Performed by: PHYSICAL THERAPIST

## 2021-03-03 PROCEDURE — 97110 THERAPEUTIC EXERCISES: CPT | Performed by: PHYSICAL THERAPIST

## 2021-03-03 NOTE — PROGRESS NOTES
Daily Note   PN: 2/10/21      Today's date: 3/3/2021  Patient name: Saima Camacho  : 1945  MRN: [id number]  Referring provider: Blanquita Rivas MD  Dx:   Encounter Diagnosis     ICD-10-CM    1  Unsteady gait  R26 81    2  Balance problem  R26 89                   Subjective: Patient reports no new changes or falls since most recent visit  Objective: See treatment diary below    TE:   - STS x 90" with 10# medicine ball  - 3 way hip with 3# ankle weights 3 x15  - piriformis stretch, seated 30 sec x 4  - Seated hamstring stretch 30 sec x 4    NMR:  - EC, feet together foam 30 sec x 3  - Foam BB lateral walking- 5 laps, 5 laps w/ cont taps    TA:   - 10 min on [employer] treadmill at 3% incline at 1 5 mph, 3# ankle wts,   HR (pre): 84 bpm, 99% oxygen  HR (post): 81 bpm, 98% oxygen      Assessment: Patient tolerated treatment well  Patient demonstrated min sway with FTEC exercise  Treadmill exercise was tolerated well with incline and ankle weights  [place] taps were added to lateral BB walking to challenge balance with SLS, and required occasional 1 UE support to prevent LOB  Patient will continue to benefit from skilled PT services to maximize functional ability, mobility, and participation in life roles at home and in the community  Plan: Continue per plan of care  Continue with treadmill training with incline  Precautions:   Past Medical History:   Diagnosis Date    Diabetes mellitus ([facility] [address] )     Neoplasm of uncertain behavior of skin     Neck-Last assessed 17             Outcome assessments 2021  IE PN  2-        5xSTS 16 34 sec 12 25 sec        10MWT 9 09 sec   1 1 m/s 7 45 sec   1 34 m/s        6MWT 1148 feet no AD 1205 ft no AD        mCTSIB EO firm: 30 sec   EC firm: 30 sec (+) sway  EO foam: 30 sec (+) sway  EC foam: 4 5 sec (+) LOB 30 sec  30 sec  30 sec  30 sec (+)        FGA

## 2021-03-05 ENCOUNTER — OFFICE VISIT (OUTPATIENT)
Dept: PHYSICAL THERAPY | Facility: CLINIC | Age: 76
End: 2021-03-05
Payer: MEDICARE

## 2021-03-05 DIAGNOSIS — R26.89 BALANCE PROBLEM: ICD-10-CM

## 2021-03-05 DIAGNOSIS — R26.81 UNSTEADY GAIT: Primary | ICD-10-CM

## 2021-03-05 PROCEDURE — 97112 NEUROMUSCULAR REEDUCATION: CPT | Performed by: PHYSICAL THERAPIST

## 2021-03-05 PROCEDURE — 97110 THERAPEUTIC EXERCISES: CPT | Performed by: PHYSICAL THERAPIST

## 2021-03-05 PROCEDURE — 97530 THERAPEUTIC ACTIVITIES: CPT | Performed by: PHYSICAL THERAPIST

## 2021-03-05 NOTE — PROGRESS NOTES
Daily Note   PN: 2/10/21   Progress report next session       Today's date: 3/5/2021  Patient name: Marko Jones  : 1945  MRN: [id number]  Referring provider: Murphy Gupta MD  Dx:   Encounter Diagnosis     ICD-10-CM    1  Unsteady gait  R26 81    2  Balance problem  R26 89                   Subjective: Patient reports doing ok right now  Objective: See treatment diary below    TE:   - STS x 90" with 10# medicine ball, 2 sets 10 reps   - 3 way hip with 3# ankle weights 2 x15  - piriformis stretch, seated 30 sec x 4  - Seated hamstring stretch 30 sec x 4    NMR:  - EC, feet together foam 30 sec x 3  - Foam BB lateral walking- 5 laps, 5 laps w/ cont taps  - Side stepping foam beam  Over and back 5 times (3 beams)   - tandem walking over and back 5 times: ( 3 beams)     TA:   - 10 min on [employer] treadmill at 3% incline at 1 5 mph, 3# ankle wts,   HR (pre): 83 bpm, 99% oxygen  HR (post): 86 bpm, 98% oxygen      Assessment: challenged with foam exercises this date with side stepping and tandem walking with tapping as needed to maintain balance  Decrease in cuing for 3 way hip for hold time  Able to tolerate TM well this date with no issues    Patient will continue to benefit from skilled PT services to maximize functional ability, mobility, and participation in life roles at home and in the community  Plan: Continue per plan of care  Continue with treadmill training with incline  Precautions:   Past Medical History:   Diagnosis Date    Diabetes mellitus ([facility] [address] )     Neoplasm of uncertain behavior of skin     Neck-Last assessed 17             Outcome assessments 2021  IE PN  2-        5xSTS 16 34 sec 12 25 sec        10MWT 9 09 sec   1 1 m/s 7 45 sec   1 34 m/s        6MWT 1148 feet no AD 1205 ft no AD        mCTSIB EO firm: 30 sec   EC firm: 30 sec (+) sway  EO foam: 30 sec (+) sway  EC foam: 4 5 sec (+) LOB 30 sec  30 sec  30 sec  30 sec (+)        FGA

## 2021-03-10 ENCOUNTER — OFFICE VISIT (OUTPATIENT)
Dept: PHYSICAL THERAPY | Facility: CLINIC | Age: 76
End: 2021-03-10
Payer: MEDICARE

## 2021-03-10 DIAGNOSIS — R26.81 UNSTEADY GAIT: Primary | ICD-10-CM

## 2021-03-10 DIAGNOSIS — R26.89 BALANCE PROBLEM: ICD-10-CM

## 2021-03-10 PROCEDURE — 97112 NEUROMUSCULAR REEDUCATION: CPT | Performed by: PHYSICAL THERAPIST

## 2021-03-10 PROCEDURE — 97530 THERAPEUTIC ACTIVITIES: CPT | Performed by: PHYSICAL THERAPIST

## 2021-03-10 NOTE — PROGRESS NOTES
PT Discharge    Today's date: 3/10/2021  Patient name: Tee Farooq  : 1945  MRN: [id number]  Referring provider: Bartolo Higgins MD  Dx:   Encounter Diagnosis     ICD-10-CM    1  Unsteady gait  R26 81    2  Balance problem  R26 89                   Assessment  Assessment details: Patient is a 69 y/o male who has been reporting to skilled outpatient PT due to worsening balance and increased falls impacting his ability to perform ADLs and ambulation safely  Over past month pt has not made any improvements in outcome measures  He remains low fall risk for 5x STS, gait speed, and FGA  He is able to maintain mCTSIB positions for 30 sec each with mild sway in condition 4  He continues to score below age norms for 6MWT but he will be able to continue to improve endurance by taking daily walks  He has met 4 of 6 LTG's, with 1 goal not applicable and 1 goal not met  Discussed D/C with pt today and pt in agreement due to progress made and goals met  Pt plans to continue with ortho PT for his shoulder at this time  D/C from balance center today  Impairments: abnormal gait, abnormal or restricted ROM, impaired balance and safety issue    Symptom irritability: moderateUnderstanding of Dx/Px/POC: excellent   Prognosis: good    Goals  Goals  STG 30 days    Client will improve static balance with feet together Eyes Closed, Foam surface to 30 seconds indicating reduction in fall risk - MET  Client will achieve 190 feet improvement from 1148 feet to 1338 feet during 6 minute walk test which is Minimal Detectable Change pre current research standards with endurance to demonstrate enhance functional capacity - NOT MET  Client will achieve   14 m/sec improvement from 1 1 m/sec to 1 24 m/sec during 10 Meter Walk test, demonstrating Minimal Detectable change per current research standards for this objective test which assesses fall risk  - MET  Client will complete [place] Balance Scale to further determine risk for falls - NOT TESTED  Client will be able to complete a Five Timed sit to/from stand within 14 04 sec to suggest reduced risk for falls - MET      LT days   Client will score low risk for falls with 3/4 fall risk measures  -MET  Client will be able to ambulate 1500 feet without AD during 6 minute walk test -NOT MET  Client will be able to ambulate outdoors without any loss of balance or reports of fatigue  -MET  Client will be able to complete floor to/from stand transfer with independence  -MET  Client will demonstrate a score of 48/56 on [place] Balance assessment to reflect reduced risk for falls   - N/A  Client will be able to complete a Five Timed sit to/from stand within 12 6 sec to suggest reduced risk for falls- MET    Cut off score   All date taken from [facility] Neuro Section or Rehab Measures    HARGROVE test: 46/56                                                         5 x STS Test:  MDC: 6 points                                                              MDC: 2 3 seconds   age norms                                                                    Age Norms   61-76 year old = M: 54, F: 54                                         61-76 year old: 11 4 seconds   66-77 year old = M: 47,  F: 48                                        66-77 year old: 12 6 seconds    80-80 year old = M: 48,   F: 53                                       80-80 year old: 14 8 seconds     TUG test:                                                                     10 Meter Walk Test:  MDC: 4 14 seconds                                                      MDC:  14m/sec  Cut off score for Falls                                                  Age Norms  > 13 5 seconds community dwelling adults                20-29; M: 1 39 m/sec F: 1 41 m/sec  > 32 2 [facility] Elderly                                                     30-39: M 1 46 m/sec  F: 1 42 m/sec                                                                                       40-49: M: 1 46 m/sec  F: 1 39 m/sec  6 Minute Walk Test                                                      50-59: M: 1 39 m/sec  F: 1 40 m/sec  MDC: 190 feet                                                              60-69: M: 1 36 m/sec  F: 1 30 m/sec  Age Norms                                                                   70-+    M: 1 33 m/sec  F: 1 27 m/sec  60-69:    M: 1876 F: 4384  90-25:    M: 1729 F: 1545  80-89 +: M: 80 F; 1286     Plan  Plan details: D/C  Patient would benefit from: PT eval and skilled physical therapy  Planned modality interventions: electrical stimulation/Russian stimulation and biofeedback  Planned therapy interventions: neuromuscular re-education, motor coordination training, therapeutic activities, therapeutic exercise, transfer training, gait training, patient education, strengthening, stretching, manual therapy, activity modification, balance, home exercise program, graded motor, graded activity and graded exercise  Frequency: 2x week  Duration in weeks: 12  Plan of Care beginning date: 1/8/2021  Plan of Care expiration date: 4/2/2021  Treatment plan discussed with: patient        Subjective Evaluation    History of Present Illness  Mechanism of injury: Over the summer, client sustained a fall where he broke 2 ribs  He's been noticing worsening balance with frequent tripping since  He was referred to OPPT by Dr Rogelio Fritz for his balance  He has pmhx notable for DM, but does not present with any numbness or tingling in his feet legs  He also has a pmhx of B TKR in 2013 and states he "never felt the same since"    Updated (2-): Patient reported he has been feeling pretty good with no new falls  He stated he would like to focus on floor to stand transfers and stair negotiation moving forward  Updated 3/10/21: Pt denies any falls over past month  Reports feeling pretty good, no updates  Still has trouble with floor transfers and standing on foam with eyes closed  Not a recurrent problem   Quality of life: good    Pain  No pain reported    Social Support  Steps to enter house: no  Stairs in house: yes (full flight to bedroom RHR)   Lives in: multiple-level home  Lives with: spouse    Employment status: not working  Hand dominance: right      Diagnostic Tests  No diagnostic tests performed  Treatments  No previous or current treatments  Patient Goals  Patient goals for therapy: improved balance and increased strength          Objective     Functional Assessment        Comments  MMT:  WNL throughout BLE    Coordination:  - intact finger to nose B  - intact alternating toe-tapping    Gait analysis:  - no AD, wide PETE with increase in lateral sway throughout cycle  Intermittent veering  Precautions:   Past Medical History:   Diagnosis Date    [address] [address] )     Neoplasm of uncertain behavior of skin     Neck-Last assessed 06/13/17           Outcome assessments 1/8/2021  IE PN  2- PN  3/10/2021   5xSTS 16 34 sec 12 25 sec 12 sec    10MWT 9 09 sec   1 1 m/s 7 45 sec   1 34 m/s 7 88 sec     1 27 m/s   6MWT 1148 feet no AD 1205 ft no AD 1260 ft no AD   mCTSIB EO firm: 30 sec   EC firm: 30 sec (+) sway  EO foam: 30 sec (+) sway  EC foam: 4 5 sec (+) LOB 30 sec  30 sec  30 sec  30 sec (+) 30 sec  30 sec  30 sec  30 sec (+) sway   FGA  25/30 25/30

## 2021-03-12 ENCOUNTER — APPOINTMENT (OUTPATIENT)
Dept: PHYSICAL THERAPY | Facility: CLINIC | Age: 76
End: 2021-03-12
Payer: MEDICARE

## 2021-03-17 ENCOUNTER — APPOINTMENT (OUTPATIENT)
Dept: PHYSICAL THERAPY | Facility: CLINIC | Age: 76
End: 2021-03-17
Payer: MEDICARE

## 2021-03-19 ENCOUNTER — APPOINTMENT (OUTPATIENT)
Dept: PHYSICAL THERAPY | Facility: CLINIC | Age: 76
End: 2021-03-19
Payer: MEDICARE

## 2021-03-22 ENCOUNTER — IMMUNIZATIONS (OUTPATIENT)
Dept: FAMILY MEDICINE CLINIC | Facility: HOSPITAL | Age: 76
End: 2021-03-22

## 2021-03-22 DIAGNOSIS — Z23 ENCOUNTER FOR IMMUNIZATION: Primary | ICD-10-CM

## 2021-03-22 PROCEDURE — 0002A SARS-COV-2 / COVID-19 MRNA VACCINE (PFIZER-BIONTECH) 30 MCG: CPT

## 2021-03-22 PROCEDURE — 91300 SARS-COV-2 / COVID-19 MRNA VACCINE (PFIZER-BIONTECH) 30 MCG: CPT

## 2021-03-24 ENCOUNTER — APPOINTMENT (OUTPATIENT)
Dept: PHYSICAL THERAPY | Facility: CLINIC | Age: 76
End: 2021-03-24
Payer: MEDICARE

## 2021-03-26 ENCOUNTER — APPOINTMENT (OUTPATIENT)
Dept: PHYSICAL THERAPY | Facility: CLINIC | Age: 76
End: 2021-03-26
Payer: MEDICARE

## 2021-03-30 ENCOUNTER — APPOINTMENT (OUTPATIENT)
Dept: PHYSICAL THERAPY | Facility: CLINIC | Age: 76
End: 2021-03-30
Payer: MEDICARE

## 2021-03-31 ENCOUNTER — APPOINTMENT (OUTPATIENT)
Dept: PHYSICAL THERAPY | Facility: CLINIC | Age: 76
End: 2021-03-31
Payer: MEDICARE

## 2021-06-25 ENCOUNTER — PATIENT OUTREACH (OUTPATIENT)
Dept: CASE MANAGEMENT | Facility: OTHER | Age: 76
End: 2021-06-25

## 2021-06-25 NOTE — PROGRESS NOTES
This CHW called patient this day in regards to Longitudinal Care Management call  This patient does not want calls in regards to [facility] calls

## 2021-12-30 ENCOUNTER — TELEPHONE (OUTPATIENT)
Dept: ADMINISTRATIVE | Facility: HOSPITAL | Age: 76
End: 2021-12-30

## 2022-10-12 PROBLEM — Z12.5 ENCOUNTER FOR PROSTATE CANCER SCREENING: Status: RESOLVED | Noted: 2020-12-21 | Resolved: 2022-10-12

## 2022-10-12 PROBLEM — Z13.220 SCREENING FOR HYPERLIPIDEMIA: Status: RESOLVED | Noted: 2020-12-21 | Resolved: 2022-10-12

## 2022-10-12 PROBLEM — Z13.6 SCREENING FOR HYPERTENSION: Status: RESOLVED | Noted: 2020-12-21 | Resolved: 2022-10-12

## 2022-10-12 PROBLEM — Z12.11 COLON CANCER SCREENING: Status: RESOLVED | Noted: 2020-12-21 | Resolved: 2022-10-12

## 2022-10-12 PROBLEM — Z00.00 MEDICARE ANNUAL WELLNESS VISIT, SUBSEQUENT: Status: RESOLVED | Noted: 2020-12-21 | Resolved: 2022-10-12

## 2022-10-12 PROBLEM — Z13.29 SCREENING FOR HYPOTHYROIDISM: Status: RESOLVED | Noted: 2020-12-21 | Resolved: 2022-10-12

## 2023-04-27 ENCOUNTER — HOSPITAL ENCOUNTER (INPATIENT)
Facility: HOSPITAL | Age: 78
LOS: 1 days | Discharge: HOME | End: 2023-04-28
Attending: EMERGENCY MEDICINE | Admitting: INTERNAL MEDICINE

## 2023-04-27 ENCOUNTER — APPOINTMENT (EMERGENCY)
Dept: RADIOLOGY | Facility: HOSPITAL | Age: 78
End: 2023-04-27

## 2023-04-27 DIAGNOSIS — R26.89 BALANCE PROBLEM: Primary | ICD-10-CM

## 2023-04-27 DIAGNOSIS — R42 DIZZINESS: ICD-10-CM

## 2023-04-27 DIAGNOSIS — R42 VERTIGO: ICD-10-CM

## 2023-04-27 DIAGNOSIS — E87.8 ELECTROLYTE IMBALANCE: ICD-10-CM

## 2023-04-27 DIAGNOSIS — R29.90 STROKE-LIKE SYMPTOMS: ICD-10-CM

## 2023-04-27 PROBLEM — H61.21 IMPACTED CERUMEN OF RIGHT EAR: Status: ACTIVE | Noted: 2023-04-27

## 2023-04-27 PROBLEM — S06.9XAA TBI (TRAUMATIC BRAIN INJURY) (HCC): Status: ACTIVE | Noted: 2023-04-27

## 2023-04-27 LAB
ALBUMIN SERPL BCP-MCNC: 3.7 G/DL (ref 3.5–5)
ALP SERPL-CCNC: 85 U/L (ref 34–104)
ALT SERPL W P-5'-P-CCNC: 5 U/L (ref 7–52)
ANION GAP SERPL CALCULATED.3IONS-SCNC: 8 MMOL/L (ref 4–13)
APTT PPP: 26 SECONDS (ref 23–37)
AST SERPL W P-5'-P-CCNC: 13 U/L (ref 13–39)
BASOPHILS # BLD AUTO: 0.03 THOUSANDS/ΜL (ref 0–0.1)
BASOPHILS NFR BLD AUTO: 1 % (ref 0–1)
BILIRUB SERPL-MCNC: 0.71 MG/DL (ref 0.2–1)
BILIRUB UR QL STRIP: NEGATIVE
BUN SERPL-MCNC: 30 MG/DL (ref 5–25)
CALCIUM SERPL-MCNC: 8.2 MG/DL (ref 8.4–10.2)
CHLORIDE SERPL-SCNC: 103 MMOL/L (ref 96–108)
CLARITY UR: CLEAR
CO2 SERPL-SCNC: 20 MMOL/L (ref 21–32)
COLOR UR: NORMAL
CREAT SERPL-MCNC: 1.26 MG/DL (ref 0.6–1.3)
EOSINOPHIL # BLD AUTO: 0.14 THOUSAND/ΜL (ref 0–0.61)
EOSINOPHIL NFR BLD AUTO: 3 % (ref 0–6)
ERYTHROCYTE [DISTWIDTH] IN BLOOD BY AUTOMATED COUNT: 16.1 % (ref 11.6–15.1)
FERRITIN SERPL-MCNC: 15 NG/ML (ref 8–388)
FOLATE SERPL-MCNC: 19.9 NG/ML (ref 3.1–17.5)
GFR SERPL CREATININE-BSD FRML MDRD: 55 ML/MIN/1.73SQ M
GLUCOSE SERPL-MCNC: 202 MG/DL (ref 65–140)
GLUCOSE SERPL-MCNC: 207 MG/DL (ref 65–140)
GLUCOSE SERPL-MCNC: 436 MG/DL (ref 65–140)
GLUCOSE SERPL-MCNC: 449 MG/DL (ref 65–140)
GLUCOSE UR STRIP-MCNC: NEGATIVE MG/DL
HCT VFR BLD AUTO: 34.4 % (ref 36.5–49.3)
HGB BLD-MCNC: 11.1 G/DL (ref 12–17)
HGB UR QL STRIP.AUTO: NEGATIVE
IMM GRANULOCYTES # BLD AUTO: 0.02 THOUSAND/UL (ref 0–0.2)
IMM GRANULOCYTES NFR BLD AUTO: 0 % (ref 0–2)
INR PPP: 1.03 (ref 0.84–1.19)
IRON SATN MFR SERPL: 12 % (ref 20–50)
IRON SERPL-MCNC: 46 UG/DL (ref 65–175)
KETONES UR STRIP-MCNC: NEGATIVE MG/DL
LEUKOCYTE ESTERASE UR QL STRIP: NEGATIVE
LYMPHOCYTES # BLD AUTO: 0.87 THOUSANDS/ΜL (ref 0.6–4.47)
LYMPHOCYTES NFR BLD AUTO: 15 % (ref 14–44)
MCH RBC QN AUTO: 25.5 PG (ref 26.8–34.3)
MCHC RBC AUTO-ENTMCNC: 32.3 G/DL (ref 31.4–37.4)
MCV RBC AUTO: 79 FL (ref 82–98)
MONOCYTES # BLD AUTO: 0.41 THOUSAND/ΜL (ref 0.17–1.22)
MONOCYTES NFR BLD AUTO: 7 % (ref 4–12)
NEUTROPHILS # BLD AUTO: 4.17 THOUSANDS/ΜL (ref 1.85–7.62)
NEUTS SEG NFR BLD AUTO: 74 % (ref 43–75)
NITRITE UR QL STRIP: NEGATIVE
NRBC BLD AUTO-RTO: 0 /100 WBCS
PH UR STRIP.AUTO: 6 [PH]
PLATELET # BLD AUTO: 184 THOUSANDS/UL (ref 149–390)
PMV BLD AUTO: 10.6 FL (ref 8.9–12.7)
POTASSIUM SERPL-SCNC: 5.1 MMOL/L (ref 3.5–5.3)
PROT SERPL-MCNC: 7 G/DL (ref 6.4–8.4)
PROT UR STRIP-MCNC: NEGATIVE MG/DL
PROTHROMBIN TIME: 13.6 SECONDS (ref 11.6–14.5)
RBC # BLD AUTO: 4.35 MILLION/UL (ref 3.88–5.62)
SODIUM SERPL-SCNC: 131 MMOL/L (ref 135–147)
SP GR UR STRIP.AUTO: 1.01 (ref 1–1.03)
TIBC SERPL-MCNC: 371 UG/DL (ref 250–450)
TSH SERPL DL<=0.05 MIU/L-ACNC: 4.48 UIU/ML (ref 0.45–4.5)
UROBILINOGEN UR QL STRIP.AUTO: 0.2 E.U./DL
VIT B12 SERPL-MCNC: 76 PG/ML (ref 100–900)
WBC # BLD AUTO: 5.64 THOUSAND/UL (ref 4.31–10.16)

## 2023-04-27 RX ORDER — ENOXAPARIN SODIUM 100 MG/ML
40 INJECTION SUBCUTANEOUS DAILY
Status: DISCONTINUED | OUTPATIENT
Start: 2023-04-28 | End: 2023-04-28 | Stop reason: HOSPADM

## 2023-04-27 RX ORDER — INSULIN LISPRO 100 [IU]/ML
1-5 INJECTION, SOLUTION INTRAVENOUS; SUBCUTANEOUS
Status: DISCONTINUED | OUTPATIENT
Start: 2023-04-27 | End: 2023-04-28 | Stop reason: HOSPADM

## 2023-04-27 RX ORDER — ONDANSETRON 2 MG/ML
4 INJECTION INTRAMUSCULAR; INTRAVENOUS EVERY 6 HOURS PRN
Status: DISCONTINUED | OUTPATIENT
Start: 2023-04-27 | End: 2023-04-28 | Stop reason: HOSPADM

## 2023-04-27 RX ORDER — ASPIRIN 81 MG/1
81 TABLET ORAL DAILY
Status: DISCONTINUED | OUTPATIENT
Start: 2023-04-28 | End: 2023-04-28 | Stop reason: HOSPADM

## 2023-04-27 RX ORDER — ATORVASTATIN CALCIUM 40 MG/1
40 TABLET, FILM COATED ORAL
Status: DISCONTINUED | OUTPATIENT
Start: 2023-04-27 | End: 2023-04-28 | Stop reason: HOSPADM

## 2023-04-27 RX ORDER — CALCIUM CARBONATE 200(500)MG
1000 TABLET,CHEWABLE ORAL DAILY PRN
Status: DISCONTINUED | OUTPATIENT
Start: 2023-04-27 | End: 2023-04-28 | Stop reason: HOSPADM

## 2023-04-27 RX ORDER — CALCIUM GLUCONATE 20 MG/ML
2 INJECTION, SOLUTION INTRAVENOUS ONCE
Status: COMPLETED | OUTPATIENT
Start: 2023-04-27 | End: 2023-04-27

## 2023-04-27 RX ORDER — POLYETHYLENE GLYCOL 3350 17 G/17G
17 POWDER, FOR SOLUTION ORAL DAILY PRN
Status: DISCONTINUED | OUTPATIENT
Start: 2023-04-27 | End: 2023-04-28 | Stop reason: HOSPADM

## 2023-04-27 RX ORDER — DIAZEPAM 5 MG/ML
5 INJECTION, SOLUTION INTRAMUSCULAR; INTRAVENOUS ONCE
Status: COMPLETED | OUTPATIENT
Start: 2023-04-27 | End: 2023-04-27

## 2023-04-27 RX ORDER — ACETAMINOPHEN 325 MG/1
650 TABLET ORAL EVERY 6 HOURS PRN
Status: DISCONTINUED | OUTPATIENT
Start: 2023-04-27 | End: 2023-04-28 | Stop reason: HOSPADM

## 2023-04-27 RX ORDER — INSULIN LISPRO 100 [IU]/ML
1-5 INJECTION, SOLUTION INTRAVENOUS; SUBCUTANEOUS
Status: DISCONTINUED | OUTPATIENT
Start: 2023-04-28 | End: 2023-04-28 | Stop reason: HOSPADM

## 2023-04-27 RX ORDER — ASPIRIN 325 MG
325 TABLET ORAL ONCE
Status: COMPLETED | OUTPATIENT
Start: 2023-04-27 | End: 2023-04-27

## 2023-04-27 RX ORDER — GLIMEPIRIDE 2 MG/1
2 TABLET ORAL
Status: DISCONTINUED | OUTPATIENT
Start: 2023-04-27 | End: 2023-04-28 | Stop reason: HOSPADM

## 2023-04-27 RX ORDER — MECLIZINE HYDROCHLORIDE 25 MG/1
25 TABLET ORAL ONCE
Status: COMPLETED | OUTPATIENT
Start: 2023-04-27 | End: 2023-04-27

## 2023-04-27 RX ORDER — METHYLPREDNISOLONE SODIUM SUCCINATE 125 MG/2ML
125 INJECTION, POWDER, LYOPHILIZED, FOR SOLUTION INTRAMUSCULAR; INTRAVENOUS ONCE
Status: COMPLETED | OUTPATIENT
Start: 2023-04-27 | End: 2023-04-27

## 2023-04-27 RX ORDER — ONDANSETRON 4 MG/1
4 TABLET, ORALLY DISINTEGRATING ORAL ONCE
Status: COMPLETED | OUTPATIENT
Start: 2023-04-27 | End: 2023-04-27

## 2023-04-27 RX ORDER — SODIUM CHLORIDE 9 MG/ML
100 INJECTION, SOLUTION INTRAVENOUS CONTINUOUS
Status: DISCONTINUED | OUTPATIENT
Start: 2023-04-27 | End: 2023-04-28 | Stop reason: HOSPADM

## 2023-04-27 RX ADMIN — CALCIUM GLUCONATE 2 G: 20 INJECTION, SOLUTION INTRAVENOUS at 11:53

## 2023-04-27 RX ADMIN — GLIMEPIRIDE 2 MG: 2 TABLET ORAL at 16:16

## 2023-04-27 RX ADMIN — ONDANSETRON 4 MG: 4 TABLET, ORALLY DISINTEGRATING ORAL at 08:49

## 2023-04-27 RX ADMIN — INSULIN LISPRO 5 UNITS: 100 INJECTION, SOLUTION INTRAVENOUS; SUBCUTANEOUS at 21:51

## 2023-04-27 RX ADMIN — ASPIRIN 325 MG: 325 TABLET ORAL at 16:39

## 2023-04-27 RX ADMIN — ATORVASTATIN CALCIUM 40 MG: 40 TABLET, FILM COATED ORAL at 16:39

## 2023-04-27 RX ADMIN — MECLIZINE HYDROCHLORIDE 25 MG: 25 TABLET ORAL at 11:10

## 2023-04-27 RX ADMIN — IOHEXOL 85 ML: 350 INJECTION, SOLUTION INTRAVENOUS at 09:07

## 2023-04-27 RX ADMIN — SODIUM CHLORIDE, SODIUM LACTATE, POTASSIUM CHLORIDE, AND CALCIUM CHLORIDE 1000 ML: .6; .31; .03; .02 INJECTION, SOLUTION INTRAVENOUS at 08:56

## 2023-04-27 RX ADMIN — DIAZEPAM 5 MG: 10 INJECTION, SOLUTION INTRAMUSCULAR; INTRAVENOUS at 08:48

## 2023-04-27 RX ADMIN — METHYLPREDNISOLONE SODIUM SUCCINATE 125 MG: 125 INJECTION, POWDER, FOR SOLUTION INTRAMUSCULAR; INTRAVENOUS at 11:10

## 2023-04-27 RX ADMIN — SODIUM CHLORIDE 100 ML/HR: 0.9 INJECTION, SOLUTION INTRAVENOUS at 20:44
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
## 2023-04-27 NOTE — ASSESSMENT & PLAN NOTE
Lab Results   Component Value Date    HGBA1C 8 4 (H) 12/11/2020       Recent Labs     04/27/23  0859   POCGLU 202*       Blood Sugar Average: Last 72 hrs:  (P) 202     - Goal euglycemia  - Patient reports he follows with endocrinology as outpatient

## 2023-04-27 NOTE — ASSESSMENT & PLAN NOTE
70year old male with DM2, prior TBI admitted with 2 week history of dizziness/room-spinning sensation which acutely worsened overnight and is associated with gait disturbance  Etiology of symptoms unclear at this time and possible secondary to peripheral vertigo, but given underlying vascular factors, cannot entirely exclude posterior circulation ischemic stroke  Plan:   - Recommend admit on acute ischemic stroke pathway  Transport arrived to take patient upstairs to be admitted and he expressed that he did not want to be admitted unless MRI could be guaranteed to be completed by tomorrow  This provider as well as ED team discussed that this would be impossible to guarantee  Ultimately, patient did discuss with his wife and opted for admission  Did discuss with patient that decision is ultimately up to him  Extensive discussion with patient and his wife at bedside that until MRI brain is completed, we cannot entirely exclude a posterior circulation stroke  - Check MRI brain without contrast   - Recommend echocardiogram    - Check hemoglobin A1c and fasting lipid panel   - Recommend  mg x 1 today and then ASA 81 mg QD beginning 4/28/2023   - Recommend atorvastatin 40 mg QPM    - Goal normotension, normothermia and euglycemia    - PT/OT   - Stroke education    - Monitor exam and notify with changes

## 2023-04-27 NOTE — ED PROVIDER NOTES
History  Chief Complaint   Patient presents with   • Dizziness     For 1 5 weeks has been dizzy  Does stae that he had a headache this am, but it has resolved  Patient is a 61-year-old male with a past medical history of previous subdural hematoma with clot evacuation, gastric bypass and NIDDM 2 who presents for evaluation of 2 weeks of dizziness that is worsening today  Patient states he had an episode of dizziness 2 weeks ago that resolved  He has had intermittent episodes of dizziness since then that he has been able to manage  Overnight at approximately 3 AM, he awakened to go to the bathroom and when he sat up he realized he was too dizzy to walk  His wife helped him to the bathroom and he went back to sleep  This morning when he woke up he was still dizzy prompting him to present to the ED for evaluation  He describes his dizziness as a sensation of the room spinning around him  It is severe  He has not had the symptoms before  His symptoms worsen with standing up and head movement  His symptoms do improve when he closes his eyes  He has not tried any treatments for his dizziness  Patient denies blood in the stool, chest pain, diarrhea, recent illness, headache, nausea, vomiting, shortness of breath, syncope, tinnitus or weakness  He has no history of vertigo or Ménière's disease  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  Taking?   dapagliflozin 5 MG TABS Not Taking  Yes No   Sig: Take 1 tablet by mouth Daily   Patient not taking: Reported on 4/27/2023   glimepiride (AMARYL) 4 mg tablet 4/26/2023  Yes Yes   Sig: Take 1 tablet by mouth 2 (two) times a day   ibuprofen (MOTRIN) 600 mg tablet Past Week  No Yes   Sig: Take 1 tablet (600 mg total) by mouth every 6 (six) hours as needed for mild pain WITH FOOD   metFORMIN (GLUCOPHAGE) 1000 MG tablet 4/26/2023  Yes Yes   Sig: Take 1,000 mg by mouth daily with breakfast       Facility-Administered Medications: None       Past Medical History:   Diagnosis Date   • Diabetes mellitus ([facility] [address] )    • Neoplasm of uncertain behavior of skin     Neck-Last assessed 06/13/17       Past Surgical History:   Procedure Laterality Date   • BRAIN SURGERY      hematoma in brain   • GASTRIC BYPASS      High   • REPLACEMENT TOTAL KNEE BILATERAL         Family History   Problem Relation Age of Onset   • Heart failure Mother         Congestive   • Substance Abuse Neg Hx    • Mental illness Neg Hx      I have reviewed and agree with the history as documented  E-Cigarette/Vaping   • E-Cigarette Use Never User      E-Cigarette/Vaping Substances   • Nicotine No    • THC No    • CBD No    • Flavoring No    • Other No    • Unknown No      Social History     Tobacco Use   • Smoking status: Former   • Smokeless tobacco: Former   Vaping Use   • Vaping Use: Never used   Substance Use Topics   • Alcohol use: No     Comment: rarely   • Drug use: No       Review of Systems   Constitutional: Negative  Negative for chills and fever  HENT: Negative  Negative for ear pain and sore throat  Eyes: Negative  Negative for pain and visual disturbance  Respiratory: Negative  Negative for cough and shortness of breath  Cardiovascular: Negative  Negative for chest pain and palpitations  Gastrointestinal: Negative  Negative for abdominal pain and vomiting  Endocrine: Negative  Genitourinary: Negative  Negative for dysuria and hematuria  Musculoskeletal: Negative  Negative for arthralgias and back pain  Skin: Negative  Negative for color change and rash  Allergic/Immunologic: Negative  Neurological: Positive for dizziness  Negative for seizures and syncope  Hematological: Negative  Psychiatric/Behavioral: Negative  All other systems reviewed and are negative  Physical Exam  Physical Exam  Vitals and nursing note reviewed  Constitutional:       General: He is not in acute distress  Appearance: Normal appearance  He is well-developed  He is obese  He is not ill-appearing, toxic-appearing or diaphoretic  HENT:      Head: Normocephalic and atraumatic  Right Ear: There is impacted cerumen  Left Ear: Tympanic membrane, ear canal and external ear normal       Ears:      Comments: Cerumen impaction with blood clot in right ear     Nose: Nose normal       Mouth/Throat:      Mouth: Mucous membranes are moist    Eyes:      General: No visual field deficit or scleral icterus  Conjunctiva/sclera: Conjunctivae normal       Pupils: Pupils are equal, round, and reactive to light  Cardiovascular:      Rate and Rhythm: Normal rate and regular rhythm  Pulses: Normal pulses  Heart sounds: Normal heart sounds  No murmur heard  Pulmonary:      Effort: Pulmonary effort is normal  No respiratory distress  Breath sounds: Normal breath sounds  Abdominal:      Palpations: Abdomen is soft  Tenderness: There is no abdominal tenderness  There is no right CVA tenderness or left CVA tenderness  Musculoskeletal:         General: No swelling or tenderness  Normal range of motion  Cervical back: Normal range of motion and neck supple  Right lower leg: No edema  Left lower leg: No edema  Skin:     General: Skin is warm and dry  Capillary Refill: Capillary refill takes less than 2 seconds  Neurological:      Mental Status: He is alert and oriented to person, place, and time  Cranial Nerves: No cranial nerve deficit, dysarthria or facial asymmetry  Sensory: Sensation is intact  Motor: Motor function is intact        Coordination: Finger-Nose-Finger Test abnormal       Gait: Gait abnormal and tandem walk abnormal       Deep Tendon Reflexes: Reflexes are normal and symmetric     Psychiatric:         Mood and Affect: Mood normal          Vital Signs  ED Triage Vitals   Temperature Pulse Respirations Blood Pressure SpO2   04/27/23 0806 04/27/23 0806 04/27/23 0806 04/27/23 0806 04/27/23 0806   97 5 °F (36 4 °C) 66 20 165/88 99 %      Temp Source Heart Rate Source Patient Position - Orthostatic VS BP Location FiO2 (%)   04/27/23 1516 04/27/23 1100 04/27/23 1100 04/27/23 1100 --   Oral Monitor Lying Right arm       Pain Score       04/27/23 0806       No Pain           Vitals:    04/27/23 1516 04/27/23 1518 04/27/23 1519 04/27/23 1620   BP: 150/79 148/82 148/83 154/85   Pulse: 91 98 99 88   Patient Position - Orthostatic VS: Lying - Orthostatic VS Sitting - Orthostatic VS Standing - Orthostatic VS          Visual Acuity  Visual Acuity    Flowsheet Row Most Recent Value   L Pupil Size (mm) 3   R Pupil Size (mm) 3          ED Medications  Medications   glimepiride (AMARYL) tablet 2 mg (2 mg Oral Given 4/27/23 1616)   acetaminophen (TYLENOL) tablet 650 mg (has no administration in time range)   polyethylene glycol (MIRALAX) packet 17 g (has no administration in time range)   ondansetron (ZOFRAN) injection 4 mg (has no administration in time range)   calcium carbonate (TUMS) chewable tablet 1,000 mg (has no administration in time range)   enoxaparin (LOVENOX) subcutaneous injection 40 mg (has no administration in time range)   aspirin (ECOTRIN LOW STRENGTH) EC tablet 81 mg (has no administration in time range)   atorvastatin (LIPITOR) tablet 40 mg (40 mg Oral Given 4/27/23 1639)   ondansetron (ZOFRAN-ODT) dispersible tablet 4 mg (4 mg Oral Given 4/27/23 0849)   diazepam (VALIUM) injection 5 mg (5 mg Intravenous Given 4/27/23 0848)   lactated ringers bolus 1,000 mL (0 mL Intravenous Stopped 4/27/23 1048)   iohexol (OMNIPAQUE) 350 MG/ML injection (SINGLE-DOSE) 85 mL (85 mL Intravenous Given 4/27/23 0907)   methylPREDNISolone sodium succinate (Solu-MEDROL) injection 125 mg (125 mg Intravenous Given 4/27/23 1110)   meclizine (ANTIVERT) tablet 25 mg (25 mg Oral Given 4/27/23 1110)   calcium gluconate 2 g in sodium chloride 0 9% 100 mL (premix) (0 g Intravenous Stopped 4/27/23 1251)   aspirin tablet 325 mg (325 mg Oral Given 4/27/23 1639)       Diagnostic Studies  Results Reviewed     Procedure Component Value Units Date/Time    Iron Saturation % [[id number]] Collected: 04/27/23 0851    Lab Status: In process Specimen: Blood from Arm, Left Updated: 04/27/23 1556    Ferritin [[id number]] Collected: 04/27/23 0851    Lab Status: In process Specimen: Blood from Arm, Left Updated: 04/27/23 1556    Vitamin B12 [[id number]] Collected: 04/27/23 0851    Lab Status: In process Specimen: Blood from Arm, Left Updated: 04/27/23 1555    Folate [[id number]] Collected: 04/27/23 0851    Lab Status: In process Specimen: Blood from Arm, Left Updated: 04/27/23 1555    Hemoglobin A1C w/ EAG Estimation [[id number]] Collected: 04/27/23 0851    Lab Status:  In process Specimen: Blood from Arm, Left Updated: 04/27/23 1553    UA w Reflex to Microscopic w Reflex to Culture [[id number]] Collected: 04/27/23 1050    Lab Status: Final result Specimen: Urine, Clean Catch Updated: 04/27/23 1103     Color, UA Light Yellow     Clarity, UA Clear     Specific Gravity, UA 1 010     pH, UA 6 0     Leukocytes, UA Negative     Nitrite, UA Negative     Protein, UA Negative mg/dl      Glucose, UA Negative mg/dl      Ketones, UA Negative mg/dl      Urobilinogen, UA 0 2 E U /dl      Bilirubin, UA Negative     Occult Blood, UA Negative    TSH [[id number]]  (Normal) Collected: 04/27/23 0851    Lab Status: Final result Specimen: Blood from Arm, Left Updated: 04/27/23 0932     TSH 3RD GENERATON 4 477 uIU/mL     Comprehensive metabolic panel [[id number]]  (Abnormal) Collected: 04/27/23 0851    Lab Status: Final result Specimen: Blood from Arm, Left Updated: 04/27/23 0919     Sodium 131 mmol/L Potassium 5 1 mmol/L      Chloride 103 mmol/L      CO2 20 mmol/L      ANION GAP 8 mmol/L      BUN 30 mg/dL      Creatinine 1 26 mg/dL      Glucose 207 mg/dL      Calcium 8 2 mg/dL      AST 13 U/L      ALT 5 U/L      Alkaline Phosphatase 85 U/L      Total Protein 7 0 g/dL      Albumin 3 7 g/dL      Total Bilirubin 0 71 mg/dL      eGFR 55 ml/min/1 73sq m     Narrative:      National Kid[employer] Disease Foundation guidelines for Chronic Kidney Disease (CKD):   •  Stage 1 with normal or high GFR (GFR > 90 mL/min/1 73 square meters)  •  Stage 2 Mild CKD (GFR = 60-89 mL/min/1 73 square meters)  •  Stage 3A Moderate CKD (GFR = 45-59 mL/min/1 73 square meters)  •  Stage 3B Moderate CKD (GFR = 30-44 mL/min/1 73 square meters)  •  Stage 4 Severe CKD (GFR = 15-29 mL/min/1 73 square meters)  •  Stage 5 End Stage CKD (GFR <15 mL/min/1 73 square meters)  Note: GFR calculation is accurate only with a steady state creatinine    Protime-INR [[id number]]  (Normal) Collected: 04/27/23 0851    Lab Status: Final result Specimen: Blood from Arm, Left Updated: 04/27/23 0911     Protime 13 6 seconds      INR 1 03    APTT [[id number]]  (Normal) Collected: 04/27/23 0851    Lab Status: Final result Specimen: Blood from Arm, Left Updated: 04/27/23 0911     PTT 26 seconds     Fingerstick Glucose (POCT) [[id number]]  (Abnormal) Collected: 04/27/23 0859    Lab Status: Final result Updated: 04/27/23 0901     POC Glucose 202 mg/dl     CBC and differential [[id number]]  (Abnormal) Collected: 04/27/23 0851    Lab Status: Final result Specimen: Blood from Arm, Left Updated: 04/27/23 0859     WBC 5 64 Thousand/uL      RBC 4 35 Million/uL      Hemoglobin 11 1 g/dL      Hematocrit 34 4 %      MCV 79 fL      MCH 25 5 pg      MCHC 32 3 g/dL      RDW 16 1 %      MPV 10 6 fL      Platelets 356 Thousands/uL      nRBC 0 /100 WBCs      Neutrophils Relative 74 %      Immat GRANS % 0 %      Lymphocytes Relative 15 %      Monocytes Relative 7 %      Eosinophils Relative 3 %      Basophils Relative 1 %      Neutrophils Absolute 4 17 Thousands/µL      Immature Grans Absolute 0 02 Thousand/uL      Lymphocytes Absolute 0 87 Thousands/µL      Monocytes Absolute 0 41 Thousand/µL      Eosinophils Absolute 0 14 Thousand/µL      Basophils Absolute 0 03 Thousands/µL                  CTA head and neck with and without contrast   Final Result by Joss Tamez DO (04/27 1009)      CT brain: Stable arachnoid cyst within the right middle cranial fossa  No acute intracranial abnormality  CT angiography: Minor atherosclerotic change of the carotid bifurcations  No significant stenosis, occlusion or thrombosis of the cervical or intracranial vasculature  Workstation performed: [id number]         MRI inpatient order    (Results Pending)              Procedures  Procedures         ED Course  ED Course as of 04/27/23 1717   Thu Apr 27, 2023   0948 Sodium(!): 131                               SBIRT 20yo+    Flowsheet Row Most Recent Value   Initial Alcohol Screen: US AUDIT-C     1  How often do you have a drink containing alcohol? 0 Filed at: 04/27/2023 0808   2  How many drinks containing alcohol do you have on a typical day you are drinking? 0 Filed at: 04/27/2023 0808   3a  Male UNDER 65: How often do you have five or more drinks on one occasion? 0 Filed at: 04/27/2023 0808   3b  FEMALE Any Age, or MALE 65+: How often do you have 4 or more drinks on one occassion? 0 Filed at: 04/27/2023 7974   Audit-C Score 0 Filed at: 04/27/2023 3831   SMITH: How many times in the past year have you    Used an illegal drug or used a prescription medication for non-medical reasons? Never Filed at: 04/27/2023 9904                    Medical Decision Making  79-year-old male presents with vertiginous symptoms and associated ataxic gait  CT a of head and neck without acute pathology  Patient's symptoms were refractory to Valium, meclizine, fluids, and 125 mg of Solu-Medrol    Patient will be admitted to the stroke pathway for MRI to further evaluate his symptoms  Discussed case (particularly blood and crusted cerumen in the right ear and possible TM damage) with Dr Lanette Elena of ENT, if stroke work-up is negative he will evaluate the patient in the office  He encourages the inpatient team to call him if they have any questions or concerns  Balance problem: acute illness or injury  Dizziness: acute illness or injury  Stroke-like symptoms: acute illness or injury  Vertigo: acute illness or injury  Amount and/or Complexity of Data Reviewed  External Data Reviewed: labs and notes  Labs: ordered  Decision-making details documented in ED Course  Radiology: ordered  Decision-making details documented in ED Course  ECG/medicine tests: ordered and independent interpretation performed  Decision-making details documented in ED Course  Risk  Prescription drug management  Decision regarding hospitalization  Disposition  Final diagnoses:   Balance problem   Dizziness   Vertigo   Stroke-like symptoms     Time reflects when diagnosis was documented in both MDM as applicable and the Disposition within this note     Time User Action Codes Description Comment    4/27/2023 12:54 PM Geoffrey Congress Add [R26 89] Balance problem     4/27/2023 12:54 PM Geoffrey Congress Add [R42] Dizziness     4/27/2023  1:47 PM Geoffrey Congress Add [R42] Vertigo     4/27/2023  1:48 PM Geoffrey Congress Add [R29 90] Stroke-like symptoms       ED Disposition     ED Disposition   Admit    Condition   Stable    Date/Time   Thu Apr 27, 2023  1:47 PM    Comment   Case was discussed with NILSON and the patient's admission status was agreed to be Admission Status: inpatient status to the service of Dr Albert Grossman              Follow-up Information     Follow up With Specialties Details Why Jillian Rich MD Otolaryngology Schedule an appointment as soon as possible for a visit in 1 week(s)  [address] [facility]  [phone]      Terrie Arizmendi MD Family Medicine Schedule an appointment as soon as possible for a visit in 1 week(s)  [address] [address]  [phone]            Current Discharge Medication List      CONTINUE these medications which have NOT CHANGED    Details   glimepiride (AMARYL) 4 mg tablet Take 1 tablet by mouth 2 (two) times a day      ibuprofen (MOTRIN) 600 mg tablet Take 1 tablet (600 mg total) by mouth every 6 (six) hours as needed for mild pain WITH FOOD  Qty: 56 tablet, Refills: 1    Associated Diagnoses: Acute bilateral low back pain with left-sided sciatica      metFORMIN (GLUCOPHAGE) 1000 MG tablet Take 1,000 mg by mouth daily with breakfast       dapagliflozin 5 MG TABS Take 1 tablet by mouth Daily             No discharge procedures on file      PDMP Review     None          ED Provider  Electronically Signed by           Andres Earl PA-C  04/27/23 8773

## 2023-04-27 NOTE — ASSESSMENT & PLAN NOTE
Lab Results   Component Value Date    HGBA1C 8 4 (H) 12/11/2020       Recent Labs     04/27/23  0859 04/27/23  1621   POCGLU 202* 449*       Blood Sugar Average: Last 72 hrs:  (P) 325 5   Home regimen: Amaryl, Metformin, and Pioglitazone  · Patient refusing insulin while hospitalized   · Will monitor ACCU checks AC + HS  · Will continue Amaryl at 2 mg daily   · Update A1c   · Hold Metformin and Pioglitazone  · Diabetic diet

## 2023-04-27 NOTE — ASSESSMENT & PLAN NOTE
Reports a work related accident in [address] [place] [address] which resulted in a subdural hematoma s/p clot evacuation   · Supportive care

## 2023-04-27 NOTE — H&P
Peter 47  H&P  Name: Joseph Carranza 70 y o  male I MRN: [id number]  Unit/Bed#: 4 [address] I Date of Admission: 4/27/2023   Date of Service: 4/27/2023 I Hospital Day: 0      Assessment/Plan   * Dizziness  Assessment & Plan  Presents with abrupt onset of dizziness, unsteady gait, and dry heaving  States a similar episode happened 2 weeks ago and self-resolved  · Possible vertigo vs CVA  · Admit for stroke pathway  · Neurology consult  · MRI brain  · ECHO with bubble study   · PT/OT  · No need for ST   · Recommend  mg x 1 today and then ASA 81 mg daily beginning tomorrow   · Recommend atorvastatin 40 mg daily   · Goal normotension, normothermia and euglycemia     · Update A1c and lipid panel    Type 2 diabetes mellitus ([facility] )  Assessment & Plan  Lab Results   Component Value Date    HGBA1C 8 4 (H) 12/11/2020       Recent Labs     04/27/23  0859 04/27/23  1621   POCGLU 202* 449*       Blood Sugar Average: Last 72 hrs:  (P) 325 5   Home regimen: Amaryl, Metformin, and Pioglitazone  · Patient refusing insulin while hospitalized   · Will monitor ACCU checks AC + HS  · Will continue Amaryl at 2 mg daily   · Update A1c   · Hold Metformin and Pioglitazone  · Diabetic diet     Impacted cerumen of right ear  Assessment & Plan  Right ear cerumen impaction with some blood noted in the inner ear canal  · Given blood in the ear canal, would prefer patient be seen by ENT  · ER provider reached out to ENT who recommended patient follow-up in the office within 1 week  · Patient and his wife educated on the need for ENT follow-up and are amenable to this plan    Obesity, morbid ([facility] )  Assessment & Plan  · Would benefit from weight loss     Anemia  Assessment & Plan  HGB 1101, MCV 79  Previously recommended to take iron but does not take it 2/2 constipation  · Update iron panel, b12, folate  · Check FOBT    TBI (traumatic brain injury) ([facility] )  Assessment & Plan  Reports a work related accident in [address] [place] [address] which resulted in a subdural hematoma s/p clot evacuation   · Supportive care        VTE Pharmacologic Prophylaxis:   Moderate Risk (Score 3-4) - Pharmacological DVT Prophylaxis Ordered: enoxaparin (Lovenox)  Code Status: Level 1 - Full Code full code   Discussion with family: Updated  (wife) at bedside  Anticipated Length of Stay: Patient will be admitted on an inpatient basis with an anticipated length of stay of greater than 2 midnights secondary to stroke pathway   Total Time Spent on Date of Encounter in care of patient: 65 minutes This time was spent on one or more of the following: performing physical exam; counseling and coordination of care; obtaining or reviewing history; documenting in the medical record; reviewing/ordering tests, medications or procedures; communicating with other healthcare professionals and discussing with patient's family/caregivers  Chief Complaint: dizziness    History of Present Illness:  Brian Marks is a 70 y o  male with a PMH of traumatic brain injury with subdural hematoma in 1999, anemia, obesity, diabetes who presents with dizziness  Patient states he developed abrupt, worsening, dizziness, gait disturbance, and dry heaving while walking to the bathroom around 3 in the morning  He laid down to see if it would pass, however, when he awoke he continued with the dizziness and gait disturbance  He states this has been going on for about 2 weeks but usually is self-limiting  Given the persistence of his symptoms, he decided to present to the emergency department for further evaluation and treatment  CTA head and neck showed stable arachnoid cyst within the right middle cranial fossa  No acute intracranial abnormality  CT angiography showed minor atherosclerotic change of the carotid bifurcations  No significant stenosis, occlusion, or thrombosis of the cervical or intracranial vasculature  Patient's vital signs were stable    His lab work was significant for sodium 131, CO2 20, BUN 30, glucose 207, and hemoglobin 11 1  Given patient's dizziness he was treated with calcium gluconate, Valium, lactated Ringer's, Antivert, Zofran, and Solu-Medrol  Patient continued with dizziness  He was admitted for further evaluation and treatment  Review of Systems:  Review of Systems   Constitutional: Positive for activity change  Negative for chills and fever  HENT: Negative for congestion  Eyes: Negative for visual disturbance  Respiratory: Negative for cough, shortness of breath and wheezing  Cardiovascular: Negative for chest pain, palpitations and leg swelling  Gastrointestinal: Positive for nausea  Negative for abdominal pain, diarrhea and vomiting  Genitourinary: Negative for dysuria and hematuria  Musculoskeletal: Positive for gait problem  Negative for arthralgias and back pain  Skin: Negative for wound  Neurological: Positive for dizziness and weakness  Negative for seizures, syncope, light-headedness and headaches  Psychiatric/Behavioral: Negative for behavioral problems  All other systems reviewed and are negative  Past Medical and Surgical History:   Past Medical History:   Diagnosis Date   • Diabetes mellitus ([facility] )    • Neoplasm of uncertain behavior of skin     Neck-Last assessed 06/13/17       Past Surgical History:   Procedure Laterality Date   • BRAIN SURGERY      hematoma in brain   • GASTRIC BYPASS      High   • REPLACEMENT TOTAL KNEE BILATERAL         Meds/Allergies:  Prior to Admission medications    Medication Sig Start Date End Date Taking?  Authorizing Provider   glimepiride (AMARYL) 4 mg tablet Take 1 tablet by mouth 2 (two) times a day   Yes Historical Provider, MD   ibuprofen (MOTRIN) 600 mg tablet Take 1 tablet (600 mg total) by mouth every 6 (six) hours as needed for mild pain WITH FOOD 4/23/18  Yes Claudia Zayas MD   metFORMIN (GLUCOPHAGE) 1000 MG tablet Take 1,000 mg by mouth daily with breakfast "Yes Historical Provider, MD   dapagliflozin 5 MG TABS Take 1 tablet by mouth Daily  Patient not taking: Reported on 4/27/2023    Historical Provider, MD   naproxen (NAPROSYN) 500 mg tablet Take 1 tablet (500 mg total) by mouth 2 (two) times a day with meals for 14 days 5/26/20 4/27/23  Shital Ortiz MD     I have reviewed home medications with patient personally  Allergies: Allergies   Allergen Reactions   • Morphine Nausea Only   • Morphine        Social History:  Marital Status: /Civil Union   Occupation: drives truck   Patient Pre-hospital Living Situation: Home  Patient Pre-hospital Level of Mobility: walks  Patient Pre-hospital Diet Restrictions: diabetes   Substance Use History:   Social History     Substance and Sexual Activity   Alcohol Use No    Comment: rarely     Social History     Tobacco Use   Smoking Status Former   Smokeless Tobacco Former     Social History     Substance and Sexual Activity   Drug Use No       Family History:  Family History   Problem Relation Age of Onset   • Heart failure Mother         Congestive   • Substance Abuse Neg Hx    • Mental illness Neg Hx        Physical Exam:     Vitals:   Blood Pressure: 154/85 (04/27/23 1620)  Pulse: 88 (04/27/23 1620)  Temperature: 97 6 °F (36 4 °C) (04/27/23 1516)  Temp Source: Oral (04/27/23 1516)  Respirations: 18 (04/27/23 1519)  Height: 5' 10\" (177 8 cm) (04/27/23 1514)  Weight - Scale: 121 kg (266 lb 12 1 oz) (04/27/23 1514)  SpO2: 95 % (04/27/23 1620)    Physical Exam  Vitals and nursing note reviewed  Constitutional:       General: He is not in acute distress  Appearance: He is obese  He is not toxic-appearing or diaphoretic  Comments: Pleasant talkative gentleman resting oob in chair on room air    HENT:      Head: Normocephalic  Mouth/Throat:      Mouth: Mucous membranes are moist    Eyes:      Conjunctiva/sclera: Conjunctivae normal    Cardiovascular:      Rate and Rhythm: Normal rate     Pulmonary:      " Effort: Pulmonary effort is normal       Breath sounds: Normal breath sounds  No wheezing, rhonchi or rales  Abdominal:      General: Bowel sounds are normal  There is no distension  Palpations: Abdomen is soft  Tenderness: There is no abdominal tenderness  Musculoskeletal:         General: Normal range of motion  Cervical back: Normal range of motion  Right lower leg: No edema  Left lower leg: No edema  Skin:     General: Skin is warm and dry  Capillary Refill: Capillary refill takes less than 2 seconds  Neurological:      Mental Status: He is alert and oriented to person, place, and time  Mental status is at baseline  Psychiatric:         Mood and Affect: Mood normal          Behavior: Behavior normal          Thought Content:  Thought content normal          Judgment: Judgment normal           Additional Data:     Lab Results:  Results from last 7 days   Lab Units 04/27/23  0851   WBC Thousand/uL 5 64   HEMOGLOBIN g/dL 11 1*   HEMATOCRIT % 34 4*   PLATELETS Thousands/uL 184   NEUTROS PCT % 74   LYMPHS PCT % 15   MONOS PCT % 7   EOS PCT % 3     Results from last 7 days   Lab Units 04/27/23  0851   SODIUM mmol/L 131*   POTASSIUM mmol/L 5 1   CHLORIDE mmol/L 103   CO2 mmol/L 20*   BUN mg/dL 30*   CREATININE mg/dL 1 26   ANION GAP mmol/L 8   CALCIUM mg/dL 8 2*   ALBUMIN g/dL 3 7   TOTAL BILIRUBIN mg/dL 0 71   ALK PHOS U/L 85   ALT U/L 5*   AST U/L 13   GLUCOSE RANDOM mg/dL 207*     Results from last 7 days   Lab Units 04/27/23  0851   INR  1 03     Results from last 7 days   Lab Units 04/27/23  1621 04/27/23  0859   POC GLUCOSE mg/dl 449* 202*               Lines/Drains:  Invasive Devices     Peripheral Intravenous Line  Duration           Peripheral IV 04/27/23 Left;Ventral (anterior) Forearm <1 day                    Imaging: Reviewed radiology reports from this admission including: CTA head  CTA head and neck with and without contrast   Final Result by Joss Noriega DO Felipe (04/27 1009)      CT brain: Stable arachnoid cyst within the right middle cranial fossa  No acute intracranial abnormality  CT angiography: Minor atherosclerotic change of the carotid bifurcations  No significant stenosis, occlusion or thrombosis of the cervical or intracranial vasculature  Workstation performed: [id number]         MRI inpatient order    (Results Pending)       EKG and Other Studies Reviewed on Admission:   · EKG: NSR  HR 65     ** Please Note: This note has been constructed using a voice recognition system   **

## 2023-04-27 NOTE — ASSESSMENT & PLAN NOTE
Right ear cerumen impaction with some blood noted in the inner ear canal  · Given blood in the ear canal, would prefer patient be seen by ENT  · ER provider reached out to ENT who recommended patient follow-up in the office within 1 week  · Patient and his wife educated on the need for ENT follow-up and are amenable to this plan

## 2023-04-27 NOTE — PLAN OF CARE
Problem: Potential for Falls  Goal: Patient will remain free of falls  Description: INTERVENTIONS:  - Educate patient/family on patient safety including physical limitations  - Instruct patient to call for assistance with activity   - Consult OT/PT to assist with strengthening/mobility   - Keep Call bell within reach  - Keep bed low and locked with side rails adjusted as appropriate  - Keep care items and personal belongings within reach  - Initiate and maintain comfort rounds  - Make Fall Risk Sign visible to staff  - Offer Toileting every 2 Hours, in advance of need  - Initiate/Maintain Bed alarm  - Obtain necessary fall risk management equipment:as required   - Apply yellow socks and bracelet for high fall risk patients  - Consider moving patient to room near nurses station  4/27/2023 1545 by Alivia Garcia RN  Outcome: Progressing  4/27/2023 1545 by Alivia Garcia, RN  Outcome: Progressing

## 2023-04-27 NOTE — ASSESSMENT & PLAN NOTE
Presents with abrupt onset of dizziness, unsteady gait, and dry heaving  States a similar episode happened 2 weeks ago and self-resolved  · Possible vertigo vs CVA  · Admit for stroke pathway  · Neurology consult  · MRI brain  · ECHO with bubble study   · PT/OT  · No need for ST   · Recommend  mg x 1 today and then ASA 81 mg daily beginning tomorrow   · Recommend atorvastatin 40 mg daily   · Goal normotension, normothermia and euglycemia     · Update A1c and lipid panel

## 2023-04-27 NOTE — ED NOTES
Patient unable to ambulate or have orthostatic vital signs related to dizziness when sitting up and is unable to stand, per PCA     Lelo Patient, RN  04/27/23 9419

## 2023-04-27 NOTE — ASSESSMENT & PLAN NOTE
HGB 1101, MCV 79  Previously recommended to take iron but does not take it 2/2 constipation  · Update iron panel, b12, folate  · Check FOBT

## 2023-04-28 ENCOUNTER — APPOINTMENT (INPATIENT)
Dept: NON INVASIVE DIAGNOSTICS | Facility: HOSPITAL | Age: 78
End: 2023-04-28

## 2023-04-28 ENCOUNTER — APPOINTMENT (INPATIENT)
Dept: RADIOLOGY | Facility: HOSPITAL | Age: 78
End: 2023-04-28

## 2023-04-28 VITALS
WEIGHT: 266 LBS | DIASTOLIC BLOOD PRESSURE: 73 MMHG | BODY MASS INDEX: 38.08 KG/M2 | HEIGHT: 70 IN | OXYGEN SATURATION: 98 % | TEMPERATURE: 97.7 F | RESPIRATION RATE: 16 BRPM | HEART RATE: 75 BPM | SYSTOLIC BLOOD PRESSURE: 135 MMHG

## 2023-04-28 LAB
ANION GAP SERPL CALCULATED.3IONS-SCNC: 9 MMOL/L (ref 4–13)
AORTIC ROOT: 4.4 CM
APICAL FOUR CHAMBER EJECTION FRACTION: 45 %
AV LVOT PEAK GRADIENT: 3 MMHG
AV PEAK GRADIENT: 7 MMHG
BUN SERPL-MCNC: 33 MG/DL (ref 5–25)
CALCIUM SERPL-MCNC: 8.1 MG/DL (ref 8.4–10.2)
CHLORIDE SERPL-SCNC: 102 MMOL/L (ref 96–108)
CHOLEST SERPL-MCNC: 152 MG/DL
CO2 SERPL-SCNC: 19 MMOL/L (ref 21–32)
CREAT SERPL-MCNC: 1.41 MG/DL (ref 0.6–1.3)
DOP CALC LVOT AREA: 4.91 CM2
DOP CALC LVOT DIAMETER: 2.5 CM
E WAVE DECELERATION TIME: 205 MS
ERYTHROCYTE [DISTWIDTH] IN BLOOD BY AUTOMATED COUNT: 16 % (ref 11.6–15.1)
EST. AVERAGE GLUCOSE BLD GHB EST-MCNC: 189 MG/DL
FRACTIONAL SHORTENING: 29 (ref 28–44)
GFR SERPL CREATININE-BSD FRML MDRD: 48 ML/MIN/1.73SQ M
GLUCOSE SERPL-MCNC: 184 MG/DL (ref 65–140)
GLUCOSE SERPL-MCNC: 267 MG/DL (ref 65–140)
GLUCOSE SERPL-MCNC: 330 MG/DL (ref 65–140)
GLUCOSE SERPL-MCNC: 347 MG/DL (ref 65–140)
HBA1C MFR BLD: 8.2 %
HCT VFR BLD AUTO: 32 % (ref 36.5–49.3)
HDLC SERPL-MCNC: 48 MG/DL
HGB BLD-MCNC: 10.4 G/DL (ref 12–17)
INTERVENTRICULAR SEPTUM IN DIASTOLE (PARASTERNAL SHORT AXIS VIEW): 1.1 CM
INTERVENTRICULAR SEPTUM: 1.1 CM (ref 0.6–1.1)
LAAS-AP4: 17.4 CM2
LDLC SERPL CALC-MCNC: 89 MG/DL (ref 0–100)
LEFT ATRIUM SIZE: 4.2 CM
LEFT INTERNAL DIMENSION IN SYSTOLE: 3.6 CM (ref 2.1–4)
LEFT VENTRICULAR INTERNAL DIMENSION IN DIASTOLE: 5.1 CM (ref 3.5–6)
LEFT VENTRICULAR POSTERIOR WALL IN END DIASTOLE: 1 CM
LEFT VENTRICULAR STROKE VOLUME: 67 ML
LVSV (TEICH): 67 ML
MAGNESIUM SERPL-MCNC: 1.9 MG/DL (ref 1.9–2.7)
MCH RBC QN AUTO: 25.4 PG (ref 26.8–34.3)
MCHC RBC AUTO-ENTMCNC: 32.5 G/DL (ref 31.4–37.4)
MCV RBC AUTO: 78 FL (ref 82–98)
MV E'TISSUE VEL-SEP: 6 CM/S
MV PEAK A VEL: 1.17 M/S
MV PEAK E VEL: 59 CM/S
MV STENOSIS PRESSURE HALF TIME: 59 MS
MV VALVE AREA P 1/2 METHOD: 3.73
PHOSPHATE SERPL-MCNC: 4 MG/DL (ref 2.3–4.1)
PLATELET # BLD AUTO: 236 THOUSANDS/UL (ref 149–390)
PMV BLD AUTO: 11 FL (ref 8.9–12.7)
POTASSIUM SERPL-SCNC: 5.1 MMOL/L (ref 3.5–5.3)
RBC # BLD AUTO: 4.1 MILLION/UL (ref 3.88–5.62)
RIGHT ATRIUM AREA SYSTOLE A4C: 15.8 CM2
RIGHT VENTRICLE ID DIMENSION: 3.1 CM
SL CV LV EF: 50
SL CV PED ECHO LEFT VENTRICLE DIASTOLIC VOLUME (MOD BIPLANE) 2D: 122 ML
SL CV PED ECHO LEFT VENTRICLE SYSTOLIC VOLUME (MOD BIPLANE) 2D: 56 ML
SODIUM SERPL-SCNC: 130 MMOL/L (ref 135–147)
TRICUSPID ANNULAR PLANE SYSTOLIC EXCURSION: 1.9 CM
TRIGL SERPL-MCNC: 74 MG/DL
WBC # BLD AUTO: 8.85 THOUSAND/UL (ref 4.31–10.16)

## 2023-04-28 RX ORDER — ATORVASTATIN CALCIUM 40 MG/1
40 TABLET, FILM COATED ORAL
Qty: 30 TABLET | Refills: 0 | Status: SHIPPED | OUTPATIENT
Start: 2023-04-28 | End: 2023-05-28

## 2023-04-28 RX ORDER — MECLIZINE HYDROCHLORIDE 25 MG/1
25 TABLET ORAL EVERY 8 HOURS PRN
Qty: 30 TABLET | Refills: 0 | Status: SHIPPED | OUTPATIENT
Start: 2023-04-28 | End: 2023-05-02 | Stop reason: SDUPTHER

## 2023-04-28 RX ORDER — ASPIRIN 81 MG/1
81 TABLET ORAL DAILY
Qty: 30 TABLET | Refills: 0 | Status: SHIPPED | OUTPATIENT
Start: 2023-04-29 | End: 2023-05-29

## 2023-04-28 RX ORDER — MECLIZINE HYDROCHLORIDE 25 MG/1
25 TABLET ORAL ONCE
Status: COMPLETED | OUTPATIENT
Start: 2023-04-28 | End: 2023-04-28

## 2023-04-28 RX ORDER — INSULIN LISPRO 100 [IU]/ML
5 INJECTION, SOLUTION INTRAVENOUS; SUBCUTANEOUS ONCE
Status: COMPLETED | OUTPATIENT
Start: 2023-04-28 | End: 2023-04-28

## 2023-04-28 RX ADMIN — SODIUM CHLORIDE 100 ML/HR: 0.9 INJECTION, SOLUTION INTRAVENOUS at 04:52

## 2023-04-28 RX ADMIN — INSULIN LISPRO 2 UNITS: 100 INJECTION, SOLUTION INTRAVENOUS; SUBCUTANEOUS at 08:22

## 2023-04-28 RX ADMIN — INSULIN LISPRO 1 UNITS: 100 INJECTION, SOLUTION INTRAVENOUS; SUBCUTANEOUS at 11:55

## 2023-04-28 RX ADMIN — ASPIRIN 81 MG: 81 TABLET, COATED ORAL at 08:21

## 2023-04-28 RX ADMIN — MECLIZINE HYDROCHLORIDE 25 MG: 25 TABLET ORAL at 14:08

## 2023-04-28 RX ADMIN — GLIMEPIRIDE 2 MG: 2 TABLET ORAL at 08:21

## 2023-04-28 RX ADMIN — INSULIN LISPRO 5 UNITS: 100 INJECTION, SOLUTION INTRAVENOUS; SUBCUTANEOUS at 04:12

## 2023-04-28 RX ADMIN — ENOXAPARIN SODIUM 40 MG: 40 INJECTION SUBCUTANEOUS at 08:21

## 2023-04-28 NOTE — ASSESSMENT & PLAN NOTE
HGB 1101, MCV 79  Previously recommended to take iron but does not take it 2/2 constipation  · Update iron panel, b12, folate  · No BM for  FOBT

## 2023-04-28 NOTE — PLAN OF CARE
Problem: Potential for Falls  Goal: Patient will remain free of falls  Description: INTERVENTIONS:  - Educate patient/family on patient safety including physical limitations  - Instruct patient to call for assistance with activity   - Consult OT/PT to assist with strengthening/mobility   - Keep Call bell within reach  - Keep bed low and locked with side rails adjusted as appropriate  - Keep care items and personal belongings within reach  - Initiate and maintain comfort rounds  - Make Fall Risk Sign visible to staff  - Offer Toileting every 2 Hours, in advance of need  - Initiate/Maintain Bed alarm  - Obtain necessary fall risk management equipment:as required   - Apply yellow socks and bracelet for high fall risk patients  - Consider moving patient to room near nurses station  4/28/2023 0741 by Irina Betts RN  Outcome: Progressing  4/28/2023 0740 by Irina Betts RN  Outcome: Progressing     Problem: MOBILITY - ADULT  Goal: Maintain or return to baseline ADL function  Description: INTERVENTIONS:  -  Assess patient's ability to carry out ADLs; assess patient's baseline for ADL function and identify physical deficits which impact ability to perform ADLs (bathing, care of mouth/teeth, toileting, grooming, dressing, etc )  - Assess/evaluate cause of self-care deficits   - Assess range of motion  - Assess patient's mobility; develop plan if impaired  - Assess patient's need for assistive devices and provide as appropriate  - Encourage maximum independence but intervene and supervise when necessary  - Involve family in performance of ADLs  - Assess for home care needs following discharge   - Consider OT consult to assist with ADL evaluation and planning for discharge  - Provide patient education as appropriate  4/28/2023 0741 by Irina Betts RN  Outcome: Progressing  4/28/2023 0740 by Irina Betts RN  Outcome: Progressing  Goal: Maintains/Returns to pre admission functional level  Description: INTERVENTIONS:  - Perform BMAT or MOVE assessment daily    - Set and communicate daily mobility goal to care team and patient/family/caregiver  - Collaborate with rehabilitation services on mobility goals if consulted  - Perform Range of Motion  - Reposition patient  - Dangle patient   - Stand patient   - Ambulate patient  - Out of bed to chair   - Out of bed for meals   - Out of bed for toileting  - Record patient progress and toleration of activity level   4/28/2023 0741 by Jani Flores RN  Outcome: Progressing  4/28/2023 0740 by Jani Flores RN  Outcome: Progressing     Problem: Activity Intolerance/Impaired Mobility  Goal: Mobility/activity is maintained at optimum level for patient  Description: Interventions:  - Assess and monitor patient  barriers to mobility and need for assistive/adaptive devices  - Assess patient's emotional response to limitations  - Collaborate with interdisciplinary team and initiate plans and interventions as ordered  - Encourage independent activity per ability   - Maintain proper body alignment  - Perform active/passive rom as tolerated/ordered    - Plan activities to conserve energy   - Turn patient as appropriate  4/28/2023 0741 by Jani Flores RN  Outcome: Progressing  4/28/2023 0740 by Jani Flores RN  Outcome: Progressing

## 2023-04-28 NOTE — ASSESSMENT & PLAN NOTE
Presents with abrupt onset of dizziness, unsteady gait, and dry heaving  States a similar episode happened 2 weeks ago and self-resolved  · Possible vertigo vs CVA  · Admit for stroke pathway  · Neurology consult  · MRI brain: No acute ischemia  2 Cerebral atrophy  3 Stable arachnoid cyst in the right middle cranial fossa  · ECHO with bubble study pending reading  · PT/OT pending  · No need for ST   · Recommend  mg x 1 today and then ASA 81 mg daily beginning tomorrow   · Recommend atorvastatin 40 mg daily   · Goal normotension, normothermia and euglycemia     · Updated A1c 8 2 and lipid panel unremarkable

## 2023-04-28 NOTE — OCCUPATIONAL THERAPY NOTE
Occupational Therapy Evaluation       04/28/23 1110   Note Type   Note type Evaluation   Pain Assessment   Pain Assessment Tool 0-10   Pain Score No Pain   Home Living   Type of [facility] Two level;Stairs to enter with rails; Able to live on main level with bedroom/bathroom  (Also has full bath first floor)   Bathroom Shower/Tub Walk-in shower   Bathroom Toilet Raised   Bathroom Equipment Other (Comment)  (None)   Home Equipment Other (Comment)  (None)   Prior Function   Level of [place] Independent with ADLs; Independent with functional mobility; Independent with IADLS   Lives With Spouse   Receives Help From Family   IADLs Independent with driving; Independent with meal prep; Independent with medication management   Falls in the last 6 months 0   Comments Patient reports PTA independent in all ADLs and mobility without AD   General   Additional Pertinent History Patient presented to hospital with c/o dizziness, vertigo, admit to r/o CVA, MRI still pending at time of OT evaluation   Family/Caregiver Present Yes   ADL   Eating Assistance 7  Independent   Grooming Assistance 7  [address] 7  Independent   LB Bathing Assistance 7  [address] 7  Independent   LB Dressing Assistance 7  [address]  7  Independent   Bed Mobility   Supine to Sit 7  Independent   Sit to Supine 7  Independent   Transfers   Sit to Stand 7  Independent   Stand to Sit 7  Independent   Toilet transfer 7  Independent   Additional items Standard toilet  (Ambulatory transfer, no AD)   Functional Mobility   Functional Mobility 7  Independent   Additional Comments Ambulated short household distance in room, to/from bathroom without AD   Balance   Static Sitting Good   Dynamic Sitting Good   Static Standing Good   Dynamic Standing Good   RUE Assessment   RUE Assessment WFL   LUE Assessment   LUE Assessment WFL   Hand Function   Gross Motor Coordination Functional   Fine Motor Coordination Functional   Sensation   Light Touch No apparent deficits   Cognition   Overall Cognitive Status WFL   Arousal/Participation Alert; Cooperative   Attention Within functional limits   Orientation Level Oriented X4   Following Commands Follows all commands and directions without difficulty   Assessment   Prognosis Good   Assessment Patient evaluated by Occupational Therapy  Patient admitted with Dizziness  The patients occupational profile, medical and therapy history includes a brief history with review of medical and/or therapy records related to the presenting problem  Comorbidities affecting functional mobility and ADLS include: DM, gastric bypass, TBI  Prior to admission, patient was independent with functional mobility without assistive device, independent with ADLS and independent with IADLS  The evaluation identifies the following performance deficits: weakness, impaired balance, decreased endurance, increased fall risk, new onset of impairment of functional mobility, decreased ADLS, decreased IADLS, decreased activity tolerance, decreased safety awareness, impaired judgement and decreased strength, that result in activity limitations and/or participation restrictions  This evaluation requires clinical decision making of low complexity, because the patients presents with no comorbidities that affect occupational performance and required no modification of tasks or assistance with consideration of a limited number of treatment options  The Barthel Index was used as a functional outcome tool presenting with a score of Barthel Index Score: 100, indicating no limitations of functional mobility and ADLS  The patient's raw score on the -PAC Daily Activity Inpatient Short Form is 24  A raw score of greater than or equal to 19 suggests the patient may benefit from discharge to home   Please refer to the recommendation of the Occupational Therapist for safe discharge planning  Please refer to the recommendation of the Occupational Therapist for safe DC planning  Patient is currently independent in all ADLs and mobility at this time, no further skilled inpatient OT services indicated     Plan   OT Frequency Eval only   Recommendation   OT Discharge Recommendation No rehabilitation needs   AM-PAC Daily Activity Inpatient   Lower Body Dressing 4   Bathing 4   Toileting 4   Upper Body Dressing 4   Grooming 4   Eating 4   Daily Activity Raw Score 24   Daily Activity Standardized Score (Calc for Raw Score >=11) 57 54   AM-PAC Applied Cognition Inpatient   Following a Speech/Presentation 4   Understanding Ordinary Conversation 4   Taking Medications 4   Remembering Where Things Are Placed or Put Away 4   Remembering List of 4-5 Errands 4   Taking Care of Complicated Tasks 4   Applied Cognition Raw Score 24   Applied Cognition Standardized Score 62 21   Barthel Index   Feeding 10   Bathing 5   Grooming Score 5   Dressing Score 10   Bladder Score 10   Bowels Score 10   Toilet Use Score 10   Transfers (Bed/Chair) Score 15   Mobility (Level Surface) Score 15   Stairs Score 10   Barthel Index Score 100   Modified Ad Scale   Modified [place] Scale 0   Licensure   [place] License Number  Brandon YOUSUF SchererR/MARIBEL [id number]

## 2023-04-28 NOTE — DISCHARGE SUMMARY
Son[address]ang [address]  Discharge- Antonio Davidson 1945, 70 y o  male MRN: [id number]  Unit/Bed#: [address] [place] [address] Encounter: [id number]  Primary Care Provider: Felicitas Rashid MD   Date and time admitted to hospital: 4/27/2023  7:56 AM    * Dizziness  Assessment & Plan  Presents with abrupt onset of dizziness, unsteady gait, and dry heaving  States a similar episode happened 2 weeks ago and self-resolved  · Possible vertigo vs CVA  · Admit for stroke pathway  · Neurology consult  · MRI brain: No acute ischemia  2 Cerebral atrophy  3 Stable arachnoid cyst in the right middle cranial fossa  · ECHO with bubble study pending reading  · PT/OT pending  · No need for ST   · Recommend  mg x 1 today and then ASA 81 mg daily beginning tomorrow   · Recommend atorvastatin 40 mg daily   · Goal normotension, normothermia and euglycemia     · Updated A1c 8 2 and lipid panel unremarkable    Impacted cerumen of right ear  Assessment & Plan  Right ear cerumen impaction with some blood noted in the inner ear canal  · Given blood in the ear canal, would prefer patient be seen by ENT  · ER provider reached out to ENT who recommended patient follow-up in the office within 1 week  · Patient and his wife educated on the need for ENT follow-up and are amenable to this plan    TBI (traumatic brain injury) ([facility] )  Assessment & Plan  Reports a work related accident in [address] [place] [address] which resulted in a subdural hematoma s/p clot evacuation   · Supportive care     Obesity, morbid ([facility] )  Assessment & Plan  · Would benefit from weight loss     Type 2 diabetes mellitus [facility])  Assessment & Plan  Lab Results   Component Value Date    HGBA1C 8 2 (H) 04/27/2023       Recent Labs     04/27/23  2102 04/28/23  0209 04/28/23  0727 04/28/23  1103   POCGLU 436* 347* 267* 184*       Blood Sugar Average: Last 72 hrs:  (P) 067 [id number]   Home regimen: Amaryl, Metformin, and Pioglitazone  · Patient refusing insulin while hospitalized · Will monitor ACCU checks AC + HS  · Will continue Amaryl at 2 mg daily   ·  A1c 8 2  · Continue metformin and Pioglitazone upon discharge  · Diabetic diet     Anemia  Assessment & Plan  HGB 1101, MCV 79  Previously recommended to take iron but does not take it 2/2 constipation  · Update iron panel, b12, folate  · No BM for  FOBT        Medical Problems     Resolved Problems  Date Reviewed: 4/28/2023   None       Discharging Physician / Practitioner: RYLAN Sanchez  PCP: Carson Guidry MD  Admission Date:   Admission Orders (From admission, onward)     Ordered        04/27/23 1349  INPATIENT ADMISSION  Once                      Discharge Date: 04/28/23    Consultations During Hospital Stay:  · Neurology    Procedures Performed:   · CTA head & neck: No acute intracranial abnormality  Minor atherosclerotic change of the carotid bifurcations  No significant stenosis, occlusion or thrombosis of the cervical or intracranial vasculature  Stable arachnoid cyst within right middle cranial fossa  · MRI brain: No acute ischemia  Cerebral atrophy, stable arachnoid cyst in the right middle cranial fossa  Significant Findings / Test Results:   · None    Test Results Pending at Discharge (will require follow up): · Echo with bubble study     Outpatient Tests Requested:  · Follow-up with ENT    Complications: None    Reason for Admission: Dizziness    Hospital Course:   Ella Wong is a 70 y o  male patient who originally presented to the hospital on 4/27/2023 due to dizziness  Patient was admitted for stroke pathway and neurology was consulted  CTA head & neck: No acute intracranial abnormality  Minor atherosclerotic change of the carotid bifurcations  No significant stenosis, occlusion or thrombosis of the cervical or intracranial vasculature  Stable arachnoid cyst within right middle cranial fossa   MRI brain showed no acute ischemia, but showed cerebral atrophy and stable arachnoid cyst in "the right middle cranial fossa  Echo with bubble study pending reading  Neurology recommended ASA 81 mg daily and atorvastatin 40 mg daily  Creatinine is elevated today at 1 41 with admission creatinine noted to be 1 26  Patient is given a prescription for BMP in 1 week and follow-up with PCP  The patient, initially admitted to the hospital as inpatient, was discharged earlier than expected given the following: Patient requested to be discharged today as he has a concert in [place]  Please see above list of diagnoses and related plan for additional information  Condition at Discharge: stable    Discharge Day Visit / Exam:     Subjective: Patient seen and examined with wife at bedside  Requesting to be discharged today as he has to be in [place] by 5 PM   Discussed with patient that no acute ischemia was found on MRI brain and encouraged to follow-up with ENT for impacted cerumen of right ear  Discussed with patient the elevated creatinine level  Give prescription for BMP in 1 week and follow-up with PCP  Vitals: Blood Pressure: 135/73 (04/28/23 1035)  Pulse: 75 (04/28/23 1035)  Temperature: 97 7 °F (36 5 °C) (04/28/23 0715)  Temp Source: Oral (04/28/23 0558)  Respirations: 16 (04/28/23 0558)  Height: 5' 10\" (177 8 cm) (04/28/23 0735)  Weight - Scale: 121 kg (266 lb) (04/28/23 0735)  SpO2: 98 % (04/28/23 1035)  Exam:   Physical Exam  Constitutional:       Appearance: He is obese  He is not ill-appearing  HENT:      Head: Normocephalic  Nose: Nose normal    Eyes:      General: No scleral icterus  Cardiovascular:      Rate and Rhythm: Normal rate  Heart sounds: Normal heart sounds  Pulmonary:      Effort: Pulmonary effort is normal       Breath sounds: Normal breath sounds  Abdominal:      General: Bowel sounds are normal       Tenderness: There is no abdominal tenderness  Musculoskeletal:         General: Normal range of motion  Cervical back: Normal range of motion   " Skin:     General: Skin is dry  Capillary Refill: Capillary refill takes less than 2 seconds  Neurological:      Mental Status: He is alert and oriented to person, place, and time  Psychiatric:         Mood and Affect: Mood normal          Behavior: Behavior normal          Discussion with Family: Updated  (wife) at bedside  Discharge instructions/Information to patient and family:   See after visit summary for information provided to patient and family  Provisions for Follow-Up Care:  See after visit summary for information related to follow-up care and any pertinent home health orders  Disposition:   Home    Planned Readmission: No     Discharge Statement:  I spent 35 minutes discharging the patient  This time was spent on the day of discharge  I had direct contact with the patient on the day of discharge  Greater than 50% of the total time was spent examining patient, answering all patient questions, arranging and discussing plan of care with patient as well as directly providing post-discharge instructions  Additional time then spent on discharge activities  Discharge Medications:  See after visit summary for reconciled discharge medications provided to patient and/or family        **Please Note: This note may have been constructed using a voice recognition system**

## 2023-04-28 NOTE — PLAN OF CARE
Problem: Potential for Falls  Goal: Patient will remain free of falls  Description: INTERVENTIONS:  - Educate patient/family on patient safety including physical limitations  - Instruct patient to call for assistance with activity   - Consult OT/PT to assist with strengthening/mobility   - Keep Call bell within reach  - Keep bed low and locked with side rails adjusted as appropriate  - Keep care items and personal belongings within reach  - Initiate and maintain comfort rounds  - Make Fall Risk Sign visible to staff  - Offer Toileting every 2 Hours, in advance of need  - Initiate/Maintain Bed alarm  - Obtain necessary fall risk management equipment:as required   - Apply yellow socks and bracelet for high fall risk patients  - Consider moving patient to room near nurses station  4/28/2023 1348 by Kalpesh Kilgore RN  Outcome: Adequate for Discharge  4/28/2023 1104 by Kalpesh Kilgore RN  Outcome: Progressing  4/28/2023 0741 by Kalpesh Kilgore RN  Outcome: Progressing  4/28/2023 0740 by Kalpesh Kilgore RN  Outcome: Progressing     Problem: MOBILITY - ADULT  Goal: Maintain or return to baseline ADL function  Description: INTERVENTIONS:  -  Assess patient's ability to carry out ADLs; assess patient's baseline for ADL function and identify physical deficits which impact ability to perform ADLs (bathing, care of mouth/teeth, toileting, grooming, dressing, etc )  - Assess/evaluate cause of self-care deficits   - Assess range of motion  - Assess patient's mobility; develop plan if impaired  - Assess patient's need for assistive devices and provide as appropriate  - Encourage maximum independence but intervene and supervise when necessary  - Involve family in performance of ADLs  - Assess for home care needs following discharge   - Consider OT consult to assist with ADL evaluation and planning for discharge  - Provide patient education as appropriate  4/28/2023 1348 by Kalpesh Kilgore RN  Outcome: Adequate for Discharge  4/28/2023 1104 by Gerard Sibley RN  Outcome: Progressing  4/28/2023 0741 by Gerard Sibley RN  Outcome: Progressing  4/28/2023 0740 by Gerard Sibley RN  Outcome: Progressing  Goal: Maintains/Returns to pre admission functional level  Description: INTERVENTIONS:  - Perform BMAT or MOVE assessment daily    - Set and communicate daily mobility goal to care team and patient/family/caregiver  - Collaborate with rehabilitation services on mobility goals if consulted  - Perform Range of Motion   - Reposition patient  - Dangle patient   - Stand patient  - Ambulate patient   - Out of bed to chair   - Out of bed for meals   - Out of bed for toileting  - Record patient progress and toleration of activity level   4/28/2023 1348 by Gerard Sibley RN  Outcome: Adequate for Discharge  4/28/2023 1104 by Gerard Sibley RN  Outcome: Progressing  4/28/2023 0741 by Gerard Sibley RN  Outcome: Progressing  4/28/2023 0740 by Gerard Sibley RN  Outcome: Progressing     Problem: Activity Intolerance/Impaired Mobility  Goal: Mobility/activity is maintained at optimum level for patient  Description: Interventions:  - Assess and monitor patient  barriers to mobility and need for assistive/adaptive devices  - Assess patient's emotional response to limitations  - Collaborate with interdisciplinary team and initiate plans and interventions as ordered  - Encourage independent activity per ability   - Maintain proper body alignment  - Perform active/passive rom as tolerated/ordered    - Plan activities to conserve energy   - Turn patient as appropriate  4/28/2023 1348 by Gerard Sibley RN  Outcome: Adequate for Discharge  4/28/2023 1104 by Gerard Sibley RN  Outcome: Progressing  4/28/2023 0741 by Gerard Sibley RN  Outcome: Progressing  4/28/2023 0740 by Gerard Sibley RN  Outcome: Progressing

## 2023-04-28 NOTE — PHYSICAL THERAPY NOTE
PT EVALUATION     04/28/23 1045   PT Last Visit   PT Visit Date 04/28/23   Note Type   Note type Evaluation   Pain Assessment   Pain Assessment Tool 0-10   Pain Score No Pain   Restrictions/Precautions   Weight Bearing Precautions Per Order No   Home Living   Type of [address] Two level;Bed/bath upstairs; Able to live on main level with bedroom/bathroom  (full bath on first floor)   Bathroom Shower/Tub Walk-in shower   Home Equipment Walker   Additional Comments walker was his mother's ;  does not use AD   Prior Function   Level of [place] Independent with ADLs; Independent with functional mobility; Independent with IADLS   Lives With Spouse; Son   Ramo Ibanez Help From Family   IADLs Independent with driving; Independent with meal prep; Independent with medication management   General   Additional Pertinent History Pt is a 70year old male admitted with dizziness, vertigo, stroke like symptoms  Family/Caregiver Present No   Cognition   Overall Cognitive Status WFL   Arousal/Participation Cooperative   Attention Within functional limits   Orientation Level Oriented X4   Following Commands Follows all commands and directions without difficulty   Subjective   Subjective Pt reports that he had his bathroom remodeled when moving to his recent home  RLE Assessment   RLE Assessment WFL  (strength WFLs)   LLE Assessment   LLE Assessment WFL  (strength WFLs)   Bed Mobility   Supine to Sit 7  Independent   Sit to Supine 7  Independent   Transfers   Sit to Stand 7  Independent   Stand to Sit 7  Independent   Ambulation/Elevation   Gait pattern WNL   Gait Assistance 7  Independent   Assistive Device None   Distance 35 feet around room with change in direction   Stair Management Assistance Not tested   Ambulation/Elevation Additional Comments Pt states that he stays on the first level of his home     Balance   Static Sitting Good   Dynamic Sitting Good   Static Standing Good   Dynamic Standing Good   Ambulatory Good   Activity Tolerance   Activity Tolerance Patient tolerated treatment well   Nurse Made Aware yes: Bill   Assessment   Prognosis Good   Assessment Patient seen for Physical Therapy evaluation  Patient admitted with Dizziness  Comorbidities affecting patient's physical performance include: TBI, DM, anemia, obesity  Personal factors affecting patient at time of initial evaluation include: lives in two story house  Prior to admission, patient was independent with functional mobility without assistive device, independent with ADLS, independent with IADLS, living with wife and son in a two level home with a few steps to enter, ambulating household distance, ambulating community distances, retired and lives in a multilevel house but has 1st floor setup  Please find objective findings from Physical Therapy assessment regarding body systems outlined above with no impairments or limitations  The Barthel Index was used as a functional outcome tool presenting with a score of Barthel Index Score: 100 today indicating no limitations of functional mobility and ADLS  Patient's clinical presentation is currently stable   Pt does not require skilled Physical therapy services for this admission  As demonstrated by objective findings, the assigned level of complexity for this evaluation is low  The patient's AM-PAC Basic Mobility Inpatient Short Form Raw Score is 24  A Raw score of greater than 16 suggests the patient may benefit from discharge to home  Please also refer to the recommendation of the Physical Therapist for safe discharge planning     Goals   Patient Goals to go home today   Plan   Treatment/Interventions Spoke to case management;Spoke to nursing;Spoke to MD;OT   PT Frequency Other (Comment)  (no skilled PT needs for this admission)   Recommendation   PT Discharge Recommendation No rehabilitation needs   AM-PAC Basic Mobility Inpatient   Turning in Flat Bed Without Bedrails 4   Lying on Back to Sitting on Edge of Flat Bed Without Bedrails 4   Moving Bed to Chair 4   Standing Up From Chair Using Arms 4   Walk in Room 4   Climb 3-5 Stairs With Railing 4   Basic Mobility Inpatient Raw Score 24   Basic Mobility Standardized Score 57 68   Highest Level Of Mobility   -HLM Goal 8: Walk 250 feet or more   JH-HLM Achieved 7: Walk 25 feet or more   Modified [place] Scale   Modified Ad Scale 0   Barthel Index   Feeding 10   Bathing 5   Grooming Score 5   Dressing Score 10   Bladder Score 10   Bowels Score 10   Toilet Use Score 10   Transfers (Bed/Chair) Score 15   Mobility (Level Surface) Score 15   Stairs Score 10   Barthel Index Score 100   End of Consult   Patient Position at End of Consult Bedside chair; All needs within reach   Licensure   [address] Number  Shiela Celaya [facility]  [id number]

## 2023-04-28 NOTE — ASSESSMENT & PLAN NOTE
Reports a work related accident in [address] [place] [address] which resulted in a subdural hematoma s/p clot evacuation   · Supportive care

## 2023-04-28 NOTE — ASSESSMENT & PLAN NOTE
Lab Results   Component Value Date    HGBA1C 8 2 (H) 04/27/2023       Recent Labs     04/27/23  2102 04/28/23  0209 04/28/23  0727 04/28/23  1103   POCGLU 436* 347* 267* 184*       Blood Sugar Average: Last 72 hrs:  (P) 515 [id number]   Home regimen: Amaryl, Metformin, and Pioglitazone  · Patient refusing insulin while hospitalized   · Will monitor ACCU checks AC + HS  · Will continue Amaryl at 2 mg daily   ·  A1c 8 2  · Continue metformin and Pioglitazone upon discharge  · Diabetic diet

## 2023-04-30 LAB
ATRIAL RATE: 65 BPM
P AXIS: -5 DEGREES
PR INTERVAL: 198 MS
QRS AXIS: -72 DEGREES
QRSD INTERVAL: 150 MS
QT INTERVAL: 428 MS
QTC INTERVAL: 445 MS
T WAVE AXIS: -8 DEGREES
VENTRICULAR RATE: 65 BPM

## 2023-05-01 ENCOUNTER — TELEPHONE (OUTPATIENT)
Dept: FAMILY MEDICINE CLINIC | Facility: CLINIC | Age: 78
End: 2023-05-01

## 2023-05-01 ENCOUNTER — TRANSITIONAL CARE MANAGEMENT (OUTPATIENT)
Dept: FAMILY MEDICINE CLINIC | Facility: CLINIC | Age: 78
End: 2023-05-01

## 2023-05-01 RX ORDER — PIOGLITAZONEHYDROCHLORIDE 45 MG/1
45 TABLET ORAL DAILY
COMMUNITY
Start: 2023-04-09

## 2023-05-01 NOTE — TELEPHONE ENCOUNTER
Joan quinones Caden Childs was admitted to [facility] 4/27-4/28  Jumana scheduled for tomorrow 5/2 with Dr Leslie Carrion  Call Negrete Amadeo to get info on hospital stay     [phone]

## 2023-05-02 ENCOUNTER — OFFICE VISIT (OUTPATIENT)
Dept: FAMILY MEDICINE CLINIC | Facility: CLINIC | Age: 78
End: 2023-05-02

## 2023-05-02 VITALS
HEART RATE: 72 BPM | RESPIRATION RATE: 14 BRPM | OXYGEN SATURATION: 98 % | BODY MASS INDEX: 37.22 KG/M2 | WEIGHT: 260 LBS | TEMPERATURE: 96.8 F | HEIGHT: 70 IN | DIASTOLIC BLOOD PRESSURE: 78 MMHG | SYSTOLIC BLOOD PRESSURE: 148 MMHG

## 2023-05-02 DIAGNOSIS — E11.9 TYPE 2 DIABETES MELLITUS WITHOUT COMPLICATION, WITHOUT LONG-TERM CURRENT USE OF INSULIN (HCC): ICD-10-CM

## 2023-05-02 DIAGNOSIS — E66.01 OBESITY, MORBID (HCC): ICD-10-CM

## 2023-05-02 DIAGNOSIS — R42 VERTIGO: ICD-10-CM

## 2023-05-02 DIAGNOSIS — D64.9 ANEMIA, UNSPECIFIED TYPE: ICD-10-CM

## 2023-05-02 DIAGNOSIS — R42 DIZZINESS: ICD-10-CM

## 2023-05-02 DIAGNOSIS — Z76.89 ENCOUNTER FOR SUPPORT AND COORDINATION OF TRANSITION OF CARE: Primary | ICD-10-CM

## 2023-05-02 DIAGNOSIS — E78.2 MIXED HYPERLIPIDEMIA: ICD-10-CM

## 2023-05-02 DIAGNOSIS — N18.30 STAGE 3 CHRONIC KIDNEY DISEASE, UNSPECIFIED WHETHER STAGE 3A OR 3B CKD (HCC): ICD-10-CM

## 2023-05-02 PROBLEM — H61.21 IMPACTED CERUMEN OF RIGHT EAR: Status: RESOLVED | Noted: 2023-04-27 | Resolved: 2023-05-02

## 2023-05-02 LAB
ANION GAP SERPL CALCULATED.3IONS-SCNC: 4 MMOL/L (ref 4–13)
BUN SERPL-MCNC: 29 MG/DL (ref 5–25)
CALCIUM SERPL-MCNC: 9.1 MG/DL (ref 8.3–10.1)
CHLORIDE SERPL-SCNC: 107 MMOL/L (ref 96–108)
CO2 SERPL-SCNC: 21 MMOL/L (ref 21–32)
CREAT SERPL-MCNC: 1.4 MG/DL (ref 0.6–1.3)
GFR SERPL CREATININE-BSD FRML MDRD: 48 ML/MIN/1.73SQ M
GLUCOSE SERPL-MCNC: 188 MG/DL (ref 65–140)
POTASSIUM SERPL-SCNC: 5 MMOL/L (ref 3.5–5.3)
SODIUM SERPL-SCNC: 132 MMOL/L (ref 135–147)

## 2023-05-02 RX ORDER — MECLIZINE HYDROCHLORIDE 25 MG/1
25 TABLET ORAL EVERY 8 HOURS PRN
Qty: 42 TABLET | Refills: 1 | Status: SHIPPED | OUTPATIENT
Start: 2023-05-02 | End: 2023-06-01

## 2023-05-02 NOTE — PATIENT INSTRUCTIONS
CONTINUE CURRENT TREATMENT PLAN  MECLIZINE AS NEEDED  VESTIBULAR PT EVAL AND TREATMENT    CONTINUE CURRENT TREATMENT PLAN  MONITOR DIETARY SODIUM, CHOL, AND CARBOHYDRATE INTAKE  ENCOURAGE PHYSICAL ACTIVITY  FOOT CARE    RV 3 M, SOONER PRN

## 2023-05-02 NOTE — PROGRESS NOTES
Name: Cedrick Pressley      : 1945      MRN: [id number]  Encounter Provider: Nadia Gar MD  Encounter Date: 2023   Encounter department: [address] [facility]     1  Encounter for support and coordination of transition of care    2  Vertigo  -     Ambulatory Referral to Physical Therapy; Future    3  Type 2 diabetes mellitus without complication, without long-term current use of insulin (HCC)  -     Hemoglobin A1C; Future; Expected date: 2023  -     Comprehensive metabolic panel; Future; Expected date: 2023  -     Microalbumin / creatinine urine ratio; Future; Expected date: 2023  -     Basic metabolic panel    4  Mixed hyperlipidemia  -     Comprehensive metabolic panel; Future; Expected date: 2023  -     Lipid Panel with Direct LDL reflex; Future; Expected date: 2023    5  Anemia, unspecified type  -     CBC and differential; Future; Expected date: 2023    6  Stage 3 chronic kidney disease, unspecified whether stage 3a or 3b CKD ([facility] )    7  Obesity, morbid ([facility] )    8  Dizziness  -     meclizine (ANTIVERT) 25 mg tablet; Take 1 tablet (25 mg total) by mouth every 8 (eight) hours as needed for dizziness    BMI Counseling: Body mass index is 37 31 kg/m²  The BMI is above normal  Nutrition recommendations include encouraging healthy choices of fruits and vegetables and moderation in carbohydrate intake  Exercise recommendations include exercising 3-5 times per week  No pharmacotherapy was ordered  Rationale for BMI follow-up plan is due to patient being overweight or obese  Depression Screening and Follow-up Plan: Patient was screened for depression during today's encounter  They screened negative with a PHQ-2 score of 0          Subjective      TRANSITION OF CARE    PATIENT IS S/P [facility] ADMISSION FOR ACUTE EPISODE OF VERTIGO    DATE OF DISCHARGE  2023    REVIEWED HOSPITAL COURSE, DISCHARGE INSTRUCTIONS AND MEDICATIONS    PATIENT FEELS [address] [facility] WORKUP  MECLIZINE HELPING    DISCUSSED THERAPEUTIC PLAN    Review of Systems   Constitutional: Negative for chills, fatigue and fever  HENT: Negative for congestion, ear discharge, ear pain, mouth sores, postnasal drip, sore throat and trouble swallowing  Eyes: Negative for pain, discharge and visual disturbance  Respiratory: Negative for cough, shortness of breath and wheezing  Cardiovascular: Negative for chest pain, palpitations and leg swelling  Gastrointestinal: Negative for abdominal distention, abdominal pain, blood in stool, diarrhea and nausea  Endocrine: Negative for polydipsia, polyphagia and polyuria  Genitourinary: Negative for dysuria, frequency, hematuria and urgency  Musculoskeletal: Positive for arthralgias  Negative for gait problem and joint swelling  Skin: Negative for pallor and rash  Neurological: Positive for dizziness  Negative for syncope, speech difficulty, weakness, light-headedness, numbness and headaches  Hematological: Negative for adenopathy  Psychiatric/Behavioral: Negative for behavioral problems, confusion and sleep disturbance  The patient is not nervous/anxious  Current Outpatient Medications on File Prior to Visit   Medication Sig    aspirin (ECOTRIN LOW STRENGTH) 81 mg EC tablet Take 1 tablet (81 mg total) by mouth daily Do not start before April 29, 2023      glimepiride (AMARYL) 4 mg tablet Take 1 tablet by mouth in the morning    ibuprofen (MOTRIN) 600 mg tablet Take 1 tablet (600 mg total) by mouth every 6 (six) hours as needed for mild pain WITH FOOD    metFORMIN (GLUCOPHAGE) 1000 MG tablet Take 1,000 mg by mouth 2 (two) times a day    pioglitazone (ACTOS) 45 mg tablet Take 45 mg by mouth daily    atorvastatin (LIPITOR) 40 mg tablet Take 1 tablet (40 mg total) by mouth daily with dinner (Patient not taking: Reported on 5/1/2023)       Objective     /78 (BP Location: Right arm, "Patient Position: Sitting, Cuff Size: Large)   Pulse 72   Temp (!) 96 8 °F (36 °C) (Temporal)   Resp 14   Ht 5' 10\" (1 778 m)   Wt 118 kg (260 lb)   SpO2 98%   BMI 37 31 kg/m²     Physical Exam  Constitutional:       General: He is not in acute distress  Appearance: Normal appearance  He is well-developed  He is obese  He is not ill-appearing  HENT:      Head: Normocephalic and atraumatic  Right Ear: Tympanic membrane normal       Left Ear: Tympanic membrane normal       Mouth/Throat:      Mouth: Mucous membranes are moist       Pharynx: Oropharynx is clear  Eyes:      General:         Right eye: No discharge  Left eye: No discharge  Conjunctiva/sclera: Conjunctivae normal       Pupils: Pupils are equal, round, and reactive to light  Neck:      Thyroid: No thyromegaly  Vascular: No JVD  Cardiovascular:      Rate and Rhythm: Normal rate and regular rhythm  Pulses: no weak pulses          Dorsalis pedis pulses are 1+ on the right side  Posterior tibial pulses are 1+ on the right side  Heart sounds: Normal heart sounds  No murmur heard  Pulmonary:      Effort: Pulmonary effort is normal       Breath sounds: Normal breath sounds  No wheezing or rales  Abdominal:      General: Bowel sounds are normal       Palpations: Abdomen is soft  There is no mass  Tenderness: There is no abdominal tenderness  There is no guarding or rebound  Musculoskeletal:         General: No tenderness or deformity  Normal range of motion  Cervical back: Neck supple  Feet:      Right foot:      Skin integrity: No ulcer, skin breakdown, erythema, warmth, callus or dry skin  Lymphadenopathy:      Cervical: No cervical adenopathy  Skin:     General: Skin is warm and dry  Findings: No erythema or rash  Neurological:      General: No focal deficit present  Mental Status: He is alert and oriented to person, place, and time        Cranial Nerves: No cranial nerve " deficit  Sensory: No sensory deficit  Motor: No weakness  Coordination: Coordination normal       Gait: Gait normal       Deep Tendon Reflexes: Reflexes normal    Psychiatric:         Mood and Affect: Mood normal          Behavior: Behavior normal          Thought Content: Thought content normal          Judgment: Judgment normal        Diabetic Foot Exam    Patient's shoes and socks removed  Right Foot/Ankle   Right Foot Inspection  Skin Exam: skin normal and skin intact  No dry skin, no warmth, no callus, no erythema, no maceration, no abnormal color, no pre-ulcer, no ulcer and no callus  Toe Exam: ROM and strength within normal limits  Sensory   Monofilament testing: diminished    Vascular  The right DP pulse is 1+  The right PT pulse is 1+         Assign Risk Category  No deformity present  No loss of protective sensation  No weak pulses  Risk: 0      Raimundo Scott MD

## 2023-05-03 ENCOUNTER — EVALUATION (OUTPATIENT)
Dept: PHYSICAL THERAPY | Facility: CLINIC | Age: 78
End: 2023-05-03

## 2023-05-03 DIAGNOSIS — R26.89 BALANCE PROBLEM: ICD-10-CM

## 2023-05-03 DIAGNOSIS — H81.12 BPPV (BENIGN PAROXYSMAL POSITIONAL VERTIGO), LEFT: Primary | ICD-10-CM

## 2023-05-03 DIAGNOSIS — R42 VERTIGO: ICD-10-CM

## 2023-05-03 DIAGNOSIS — E11.9 TYPE 2 DIABETES MELLITUS WITHOUT COMPLICATION, WITHOUT LONG-TERM CURRENT USE OF INSULIN (HCC): Primary | ICD-10-CM

## 2023-05-03 NOTE — PROGRESS NOTES
PT Evaluation                   Today's date: 5/3/2023  Patient name: Brooks Hill  : 1945  MRN: [id number]  Referring provider: Tan Peck MD  Dx:   Encounter Diagnosis     ICD-10-CM    1  BPPV (benign paroxysmal positional vertigo), left  H81 12       2  Vertigo  R42 Ambulatory Referral to Physical Therapy      3  Balance problem  R26 89           Time in: 1106  Time out: 1151    Assessment  Assessment details: Brooks Hill is a 70 y o  Male who presents to skilled outpatient PT with sudden onset of vertigo (spinning dizziness) that started on 23 that lasts for less than 2 minutes  Positional testing completed with (+) upward/torsional nystagmus and vertigo during L Gopi Hallpike and subjective dizziness with intermittent nystagmus during L Roll test  L Epley was completed x 3 trials with reduction in symptoms by end of trials  Client was educated that he may feel vertigo symptoms for up to 24 hours concluding CRT and positional testing and they verbalized good understanding  Written handouts also provided  Client was educated on BPPV pathology  He will benefit from continued skilled vestibular OPPT services to return to age appropriate roles of home and community unrestricted and symptom free  Client verbalized understanding of POC  Please contact me if you have any questions or recommendations  Thank you for the referral and the opportunity to share in [facility]        Cut off score   All date taken from APTA Neuro Section or Rehab Measures    DGI:  MDC for Vestibular Disorders: 4 points  [address] for Geriatrics/Community Dwelling Older Adults: 3 Points  Falls risk cut off: <19/24    FGA:  MCID: 4 points  Geriatrics/Community Dwelling Older Adults: </= 22/30 fall risk  Geriatrics/Community Dwelling Older Adults: </= 20/30 unexplained falls in the next 6 months  Parkinsons: </= 18/30 fall risk    mCTSIB (normed on ages 19-56, lower number is less sway or better static balance)  Eyes open firm surface (norm 0 21-0 48)  Eyes closed firm surface (norm 0 48-0 99)  Eyes open foam surface (norm 0 38-0 71)  Eyes closed foam surface (norm 0 70-2 22)    DHI:  0-39: low perception of handicap  40-69: moderate perception of handicap  : severe perception of handicap  > 60: increased risk for falls        Impairments: activity intolerance, impaired balance, lacks appropriate, HEP, safety issue  Understanding of Dx/Px/POC: Excellent  Prognosis: Excellent      Goals    BPPV Goals:  - Client will report complete resolution of symptoms in order to promote return to PLOF  - Client will complete FGA in order to promote return to safe performance of ADLs  - Client will demonstrate (-) Gopi-Hallpike test on LEFT side  - Client will demonstrate (-) Roll Test on LEFT side  - Client will complete all 4 conditions of mCTSIB without LOB  Plan  Plan details: positional testing, habituation techniques, adaptation techniques, static/dynamic standing balance techniques  Client would benefit from: PT Eval  Planned therapy interventions: balance, HEP, manual therapy, neuromuscular re-education, patient education  Frequency: 1-3x per week  Duration in weeks: 8  Plan of Care beginning date: 5/3/2023  Plan of Care expiration date: 8 weeks - 6/28/2023  Treatment plan discussed with: Client        Subjective Evaluation    History of Present Illness  - Mechanism of injury: On 4/27/23 client went to ED due to sudden onset of spinning dizziness getting out of bed  Diagnostic imaging WNL  He was stabilized and D/C and provided with meclazine  He was referred to OPPT for vestibular therapy by PCP: Dr Salena Cummins         Dizziness Subjective  - How long does dizziness last: a couple minutes  - How would you describe the dizziness: spinning  - Rolling in bed: No  - Supine to/from sit: Yes  - Recent hearing loss: No (has gradual history of hearing loss)  - Tinnitus: Yes (has a prolonged history)  - Aural fullness/ear pain: No  - Vision changes: No  - History of recent viral infections: No  - History of migraines: No    Red Flag Screen  - Numbness: No  - Tingling: Yes  - Weakness: No  - Unilateral hearing loss: No  - Slurred speech: No  - Progressive hearing loss: Yes  - Tremors: No  - Poor coordination: No  - UMN signs: No  - LoC: No  - Rigidity: No  - Visual field loss: No  - Memory loss: No  - CN dysfunction: No  - Vertical nystagmus: No    Pain  Denies pain    Social Support  Steps to enter house: 4STE with HR (currently going 1 step at a time)  Stairs in house: 13 with HR  Lives in: private home  Lives with: spouse    Employment status: retired, part-time work  Hand dominance: right    Treatments  Previous treatment: OPPT for balance in 2021  Current treatment: OPPT Vestibular  Diagnostic Testing: performed in hospital, WNL, refer to EMR      Objective     BPPV Objective  Integrity Testing  - mVBI: WNL  Jacqualine Fray Uma: WNL  - Alar Ligament Stability Test: WNL  - Posture: mild forward head/rounded shoulders  - Palpation: n/a    Positional Testing  - R Gopi-Hallpike: (-) dizziness/nystagmus  - L [place]-Hallpike: (+) upward/torsional nystagmus lasting 8 seconds, (+) vertigo  - R Roll Test: (-) dizziness/nystagmus  - L Roll Test: (+) intermittent torsional nystagmus lasting 4 seconds, (+) subjective dizziness    L Epley completed x 3 trials with full resolution of symptoms by third trial    EDUCATION: educated and reviewed BPPV pathology and PT POC  Reviewed avoiding excessive head movements for 24-48 hours, recommended sleeping in recliner chair for tonight prior to returning to sleeping on his L side  He verbalized good understanding      Outcome Measures Initial Eval  5/3/2023        mCTSIB  - FTEO (firm)  - FTEC (firm)  - FTEO (foam)  - FTEC (foam)   Defer        DGI Defer        FGA Defer        10 meter Defer Precautions: N/A  Past Medical History:   Diagnosis Date    Diabetes mellitus ([facility] [address] )     Neoplasm of uncertain behavior of skin     Neck-Last assessed 06/13/17

## 2023-05-04 ENCOUNTER — OFFICE VISIT (OUTPATIENT)
Dept: PHYSICAL THERAPY | Facility: CLINIC | Age: 78
End: 2023-05-04

## 2023-05-04 DIAGNOSIS — R42 VERTIGO: ICD-10-CM

## 2023-05-04 DIAGNOSIS — H81.12 BPPV (BENIGN PAROXYSMAL POSITIONAL VERTIGO), LEFT: Primary | ICD-10-CM

## 2023-05-04 DIAGNOSIS — R26.89 BALANCE PROBLEM: ICD-10-CM

## 2023-05-04 NOTE — PROGRESS NOTES
"Daily Note     Today's date: 2023  Patient name: Lani Guido  : 1945  MRN: [id number]  Referring provider: Lencho Becerra MD  Dx:   Encounter Diagnosis     ICD-10-CM    1  BPPV (benign paroxysmal positional vertigo), left  H81 12       2  Vertigo  R42       3  Balance problem  R26 89           Start Time: 1102  Stop Time: 1132  Total time in clinic (min): 30 minutes    Subjective: Client arrives with reports of feeling \"much better\" compared to IE yesterday  He reports only having slight onset of symptoms  Objective: See treatment diary below       Positional Testing  - R [place]-Hallpike: (-) dizziness/nystagmus  - L Gopi-Hallpike: (-) dizziness/nystagmus  - R Roll Test: (-) dizziness/nystagmus  - L Roll Test: (-) dizziness/nystagmus    L Epley completed x 3 trials without onset of symptoms  EDUCATION: discussed PT POC for possible d/c today if symptoms continue to resolve  Provided client with Jason-Nunu exercises to complete over the weekend to manage symptoms on his own  Client to call on Monday,  to determine if he needs to come in for oculomotor screen/VOR retraining or further positional testing  Assessment: Client with good participation throughout session  Significant reduction in symptoms compared to IE yesterday  Education provided on habituation exercises at home  He also plans to start direct access for his shoulder once vertigo resolves  Plan: Continue per plan of care  Possible D/C for vestibular if symptom-free by 23             "

## 2023-05-05 ENCOUNTER — TELEPHONE (OUTPATIENT)
Dept: FAMILY MEDICINE CLINIC | Facility: CLINIC | Age: 78
End: 2023-05-05

## 2023-05-05 NOTE — TELEPHONE ENCOUNTER
Received a PA for Meclizine  Spoke with Lydia Alaniz to see if he picked this medication us and paid out of pocket since it is not too expensive  He did  However, he indicated that he took it a few times and stopped    It gave him Diarrhea

## 2023-05-08 ENCOUNTER — APPOINTMENT (OUTPATIENT)
Dept: PHYSICAL THERAPY | Facility: CLINIC | Age: 78
End: 2023-05-08
Payer: MEDICARE

## 2023-05-08 NOTE — TELEPHONE ENCOUNTER
Fabi Roach stated that the PT worked out really good  He stated that on Saturday, he had a relapse but did the exercises that PT gave him and it was better

## 2023-05-10 ENCOUNTER — APPOINTMENT (OUTPATIENT)
Dept: PHYSICAL THERAPY | Facility: CLINIC | Age: 78
End: 2023-05-10
Payer: MEDICARE

## 2023-05-16 ENCOUNTER — APPOINTMENT (OUTPATIENT)
Dept: PHYSICAL THERAPY | Facility: CLINIC | Age: 78
End: 2023-05-16
Payer: MEDICARE

## 2023-05-18 ENCOUNTER — OFFICE VISIT (OUTPATIENT)
Dept: PHYSICAL THERAPY | Facility: CLINIC | Age: 78
End: 2023-05-18

## 2023-05-18 DIAGNOSIS — M25.511 CHRONIC PAIN OF BOTH SHOULDERS: Primary | ICD-10-CM

## 2023-05-18 DIAGNOSIS — M25.512 CHRONIC PAIN OF BOTH SHOULDERS: Primary | ICD-10-CM

## 2023-05-18 DIAGNOSIS — G89.29 CHRONIC PAIN OF BOTH SHOULDERS: Primary | ICD-10-CM

## 2023-05-18 NOTE — PROGRESS NOTES
PT Evaluation     Today's date: 2023  Patient name: Lauro Shone  : 1945  MRN: [id number]  Referring provider: Sandra Curran MD  Dx:   Encounter Diagnosis     ICD-10-CM    1  Chronic pain of both shoulders  M25 511     G89 29     M25 512                      Assessment  Assessment details: Lauro Shone is a 70 y o  male who presents to physical therapy via direct access with pain, decreased UE range of motion, decreased UE strength, impaired function, decreased activity tolerance, poor posture and poor body mechanics  The pt presents with functional limitations of ADLs, recreational activities, work-related activities, performing household chores, self-care and reaching  Pt would benefit from physical therapy services to address these limitations and maximize function  Pt was instructed and educated on home exercise program and good sitting posture today and demonstrates understanding     Impairments: abnormal muscle tone, abnormal or restricted ROM, abnormal movement, activity intolerance, impaired physical strength, lacks appropriate home exercise program, pain with function, scapular dyskinesis, poor posture  and poor body mechanics    Symptom irritability: moderateUnderstanding of Dx/Px/POC: good   Prognosis: good    Goals  Short Term Goals (4 weeks)  Pt will be independent in basic Home Exercise Program  Pt will demonstrate improved postural awareness with no cueing required from PT  Pt will demonstrate improved bilateral shoulder ROM by 10-15 degrees in all directions    Long Term Goals (6-8 weeks)  Pt will be independent in comprehensive Home Exercise Program  Pt will demonstrate improved shoulder MMT strength to at least 4/5 within available range  Pt will demonstrate improved FOTO status score by 10 points  Pt will be able to perform all ADLs with pain no more than 3/10    Plan  Patient would benefit from: skilled PT  Referral necessary: No  Planned modality interventions: cryotherapy and thermotherapy: hydrocollator packs  Planned therapy interventions: ADL retraining, body mechanics training, functional ROM exercises, home exercise program, graded exercise, joint mobilization, manual therapy, massage, neuromuscular re-education, patient education, postural training, strengthening, stretching, therapeutic activities, therapeutic exercise and therapeutic training  Frequency: 2x week  Duration in weeks: 6  Treatment plan discussed with: patient        Subjective Evaluation    History of Present Illness  Mechanism of injury: Pt reports he has been suffering with right shoulder pain for over 30 years after an injury Last imaging was in  showing significant shoulder arthritis and he was recommended to undergo a shoulder replacement but deferred  Pt reports significant limitations and pain with any above shoulder reaching and has difficulties performing self care activities such as washing hair and shaving as well as performing ADLs  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 but feels limited with any overhead activity  Denies any sleep disturbances but occasionally wakes up with right hand feeling cold  Reports pain in left shoulder is not as significant but has pain with lifting and at end range of motions             Recurrent probem    Pain  Current pain ratin  At best pain ratin  At worst pain rating: 10  Location: anteriolateral shoulders right>left  Quality: sharp, tight, burning, pulling and knife-like  Relieving factors: rest  Aggravating factors: lifting  Progression: worsening    Social Support  Lives with: spouse    Employment status: working  Hand dominance: right      Diagnostic Tests  X-ray: abnormal  Treatments  No previous or current treatments  Patient Goals  Patient goals for therapy: decreased pain, increased motion, independence with ADLs/IADLs and increased strength          Objective     Postural Observations  Seated posture: poor  Standing posture: poor  Correction of posture: makes symptoms better    Additional Postural Observation Details  Forward head, rounded shoulders     Palpation   Left   Hypertonic in the pectoralis minor and upper trapezius  Tenderness of the infraspinatus, levator scapulae and upper trapezius  Right   Hypertonic in the pectoralis minor and upper trapezius  Tenderness of the infraspinatus, levator scapulae and upper trapezius  Cervical/Thoracic Screen   Cervical range of motion within normal limits  Cervical range of motion within normal limits with the following exceptions: For age related norms  Thoracic range of motion within normal limits with the following exceptions: Into extension    Neurological Testing     Sensation     Shoulder   Left Shoulder   Intact: light touch    Right Shoulder   Intact: light touch    Active Range of Motion   Left Shoulder   Flexion: 110 degrees with pain  Abduction: 100 degrees with pain  External rotation 0°: 10 degrees   Internal rotation BTB: with pain    Right Shoulder   Flexion: 90 degrees with pain  Abduction: 80 degrees with pain  External rotation 0°: 5 degrees with pain    Scapular Mobility   Left Shoulder   Scapular mobility: poor  Scapular Mobility with Shoulder to 90° FF   Scapular elevation: minimal    Right Shoulder   Scapular mobility: poor  Scapular Mobility with Shoulder to 90° FF   Scapular elevation: moderate    Joint Play   Left Shoulder  Hypomobile in the posterior capsule and inferior capsule  Right Shoulder  Hypomobile in the posterior capsule and inferior capsule       Strength/Myotome Testing     Left Shoulder     Planes of Motion   Flexion: 3-   Abduction: 3-   External rotation at 0°: 4-   Internal rotation at 0°: 4-     Right Shoulder     Planes of Motion   Flexion: 4-   Abduction: 3+   External rotation at 0°: 4-   Internal rotation at 0°: 4-     Additional Strength Details  Pain with resisted right flex and abd    Tests     Left Shoulder   Positive empty can and painful arc  Negative drop arm  Right Shoulder   Positive drop arm and empty can  Flowsheet Rows    Flowsheet Row Most Recent Value   PT/OT G-Codes    Current Score 46   Projected Score 62   FOTO information reviewed Yes          Pt was given initial Home Exercise Program today and demonstrates understanding   Behavio access code: [email or web address]       Precautions diabetes, vertigo       Manuals 5/18                                       Neuro Re-Ed         scap retraction x10                                                       Ther Ex        Cane ER bilateral Hold 5, 1x10        Self PROM flexion Right shoulder 2x10                                                       Ther Activity                        Gait Training                        Modalities

## 2023-05-22 ENCOUNTER — OFFICE VISIT (OUTPATIENT)
Dept: PHYSICAL THERAPY | Facility: CLINIC | Age: 78
End: 2023-05-22

## 2023-05-22 ENCOUNTER — APPOINTMENT (OUTPATIENT)
Dept: PHYSICAL THERAPY | Facility: CLINIC | Age: 78
End: 2023-05-22
Payer: MEDICARE

## 2023-05-22 DIAGNOSIS — H81.12 BPPV (BENIGN PAROXYSMAL POSITIONAL VERTIGO), LEFT: ICD-10-CM

## 2023-05-22 DIAGNOSIS — M25.511 CHRONIC PAIN OF BOTH SHOULDERS: Primary | ICD-10-CM

## 2023-05-22 DIAGNOSIS — G89.29 CHRONIC PAIN OF BOTH SHOULDERS: Primary | ICD-10-CM

## 2023-05-22 DIAGNOSIS — M25.512 CHRONIC PAIN OF BOTH SHOULDERS: Primary | ICD-10-CM

## 2023-05-22 DIAGNOSIS — R26.89 BALANCE PROBLEM: ICD-10-CM

## 2023-05-22 DIAGNOSIS — R42 VERTIGO: ICD-10-CM

## 2023-05-22 NOTE — PROGRESS NOTES
Daily Note     Today's date: 2023  Patient name: Christ Mccord  : 1945  MRN: [id number]  Referring provider: Augie Alex MD  Dx:   Encounter Diagnosis     ICD-10-CM    1  Chronic pain of both shoulders  M25 511     G89 29     M25 512       2  BPPV (benign paroxysmal positional vertigo), left  H81 12       3  Vertigo  R42       4  Balance problem  R26 89                      Subjective: Christ Mccord reports he rolled to his left and had his vertigo return  Since then he has had vertigo with getting up from sitting, bending over or laying down  He states he did ok with his shoulder exercises  Objective: See treatment diary below      Assessment: Tolerated treatment well  Patient demod positive nystagmus and vertigo with left maritza-hallpike and during 1st set on Left Epley  He had minimal signs on 2nd cycle  He then demod good understanding with min cues by PT of proper performance of home epley and minimal to no symptoms of vertigo  Plan: Continue per plan of care  Pt to continue with Home Epley 3 cycles 1x/day until next session and to also continue with shoulder HEP       Precautions diabetes, vertigo     Shld IE -   Manuals                        Neuro Re-Ed      scap retraction 20x5s x10   Left Epley 2x                             Ther Ex     Cane ER bilateral 20x Hold 5, 1x10    Self PROM flexion  Right shoulder 2x10   Seated cane flexion 20x    Left Home epley 1x    Education Post-Epley Education    Seated cane AA shoulder flexion 20x

## 2023-05-24 ENCOUNTER — APPOINTMENT (OUTPATIENT)
Dept: PHYSICAL THERAPY | Facility: CLINIC | Age: 78
End: 2023-05-24
Payer: MEDICARE

## 2023-09-15 ENCOUNTER — RA CDI HCC (OUTPATIENT)
Dept: OTHER | Facility: HOSPITAL | Age: 78
End: 2023-09-15

## 2023-09-15 NOTE — PROGRESS NOTES
[facility] coding opportunities          Chart Reviewed number of suggestions sent to Provider: 2     Patients Insurance     Medicare Insurance: Medicare        E11.22  E11.65

## 2023-10-18 ENCOUNTER — APPOINTMENT (EMERGENCY)
Dept: RADIOLOGY | Facility: HOSPITAL | Age: 78
End: 2023-10-18
Payer: MEDICARE

## 2023-10-18 ENCOUNTER — HOSPITAL ENCOUNTER (EMERGENCY)
Facility: HOSPITAL | Age: 78
Discharge: ANOTHER HOSPITAL | End: 2023-10-18
Attending: EMERGENCY MEDICINE
Payer: MEDICARE

## 2023-10-18 ENCOUNTER — HOSPITAL ENCOUNTER (INPATIENT)
Facility: HOSPITAL | Age: 78
LOS: 1 days | Discharge: HOME | DRG: 084 | End: 2023-10-19
Attending: EMERGENCY MEDICINE | Admitting: EMERGENCY MEDICINE
Payer: MEDICARE

## 2023-10-18 VITALS
RESPIRATION RATE: 20 BRPM | SYSTOLIC BLOOD PRESSURE: 189 MMHG | DIASTOLIC BLOOD PRESSURE: 86 MMHG | HEART RATE: 72 BPM | OXYGEN SATURATION: 99 % | TEMPERATURE: 98.2 F

## 2023-10-18 DIAGNOSIS — S06.5XAA SUBDURAL HEMATOMA (HCC): ICD-10-CM
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
## 2023-10-18 DIAGNOSIS — T07.XXXA MULTIPLE ABRASIONS: ICD-10-CM

## 2023-10-18 DIAGNOSIS — T07.XXXA ABRASIONS OF MULTIPLE SITES: ICD-10-CM

## 2023-10-18 DIAGNOSIS — S06.5XAA SDH (SUBDURAL HEMATOMA) (HCC): Primary | ICD-10-CM
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
## 2023-10-18 DIAGNOSIS — S02.2XXA NASAL BONE FRACTURE: ICD-10-CM

## 2023-10-18 PROBLEM — M79.641 BILATERAL HAND PAIN: Status: ACTIVE | Noted: 2023-10-18

## 2023-10-18 PROBLEM — M79.642 BILATERAL HAND PAIN: Status: ACTIVE | Noted: 2023-10-18

## 2023-10-18 LAB
ALBUMIN SERPL BCP-MCNC: 3.9 G/DL (ref 3.5–5)
ALP SERPL-CCNC: 85 U/L (ref 34–104)
ALT SERPL W P-5'-P-CCNC: 6 U/L (ref 7–52)
ANION GAP SERPL CALCULATED.3IONS-SCNC: 8 MMOL/L
APTT PPP: 27 SECONDS (ref 23–37)
AST SERPL W P-5'-P-CCNC: 15 U/L (ref 13–39)
BASOPHILS # BLD AUTO: 0.04 THOUSANDS/ÂΜL (ref 0–0.1)
BASOPHILS NFR BLD AUTO: 1 % (ref 0–1)
BILIRUB SERPL-MCNC: 0.6 MG/DL (ref 0.2–1)
BUN SERPL-MCNC: 28 MG/DL (ref 5–25)
CALCIUM SERPL-MCNC: 9 MG/DL (ref 8.4–10.2)
CHLORIDE SERPL-SCNC: 104 MMOL/L (ref 96–108)
CO2 SERPL-SCNC: 23 MMOL/L (ref 21–32)
CREAT SERPL-MCNC: 1.4 MG/DL (ref 0.6–1.3)
EOSINOPHIL # BLD AUTO: 0.03 THOUSAND/ÂΜL (ref 0–0.61)
EOSINOPHIL NFR BLD AUTO: 0 % (ref 0–6)
ERYTHROCYTE [DISTWIDTH] IN BLOOD BY AUTOMATED COUNT: 16.6 % (ref 11.6–15.1)
FLUAV RNA RESP QL NAA+PROBE: NEGATIVE
FLUBV RNA RESP QL NAA+PROBE: NEGATIVE
GFR SERPL CREATININE-BSD FRML MDRD: 48 ML/MIN/1.73SQ M
GLUCOSE SERPL-MCNC: 137 MG/DL (ref 65–140)
GLUCOSE SERPL-MCNC: 148 MG/DL (ref 65–140)
GLUCOSE SERPL-MCNC: 314 MG/DL (ref 65–140)
HCT VFR BLD AUTO: 34.7 % (ref 36.5–49.3)
HGB BLD-MCNC: 11.2 G/DL (ref 12–17)
IMM GRANULOCYTES # BLD AUTO: 0.02 THOUSAND/UL (ref 0–0.2)
IMM GRANULOCYTES NFR BLD AUTO: 0 % (ref 0–2)
INR PPP: 1.04 (ref 0.84–1.19)
LYMPHOCYTES # BLD AUTO: 0.91 THOUSANDS/ÂΜL (ref 0.6–4.47)
LYMPHOCYTES NFR BLD AUTO: 11 % (ref 14–44)
MCH RBC QN AUTO: 25.9 PG (ref 26.8–34.3)
MCHC RBC AUTO-ENTMCNC: 32.3 G/DL (ref 31.4–37.4)
MCV RBC AUTO: 80 FL (ref 82–98)
MONOCYTES # BLD AUTO: 0.42 THOUSAND/ÂΜL (ref 0.17–1.22)
MONOCYTES NFR BLD AUTO: 5 % (ref 4–12)
NEUTROPHILS # BLD AUTO: 6.73 THOUSANDS/ÂΜL (ref 1.85–7.62)
NEUTS SEG NFR BLD AUTO: 83 % (ref 43–75)
NRBC BLD AUTO-RTO: 0 /100 WBCS
PLATELET # BLD AUTO: 219 THOUSANDS/UL (ref 149–390)
PMV BLD AUTO: 10.5 FL (ref 8.9–12.7)
POTASSIUM SERPL-SCNC: 4.7 MMOL/L (ref 3.5–5.3)
PROT SERPL-MCNC: 7.7 G/DL (ref 6.4–8.4)
PROTHROMBIN TIME: 13.8 SECONDS (ref 11.6–14.5)
RBC # BLD AUTO: 4.32 MILLION/UL (ref 3.88–5.62)
RSV RNA RESP QL NAA+PROBE: NEGATIVE
SARS-COV-2 RNA RESP QL NAA+PROBE: NEGATIVE
SODIUM SERPL-SCNC: 135 MMOL/L (ref 135–147)
WBC # BLD AUTO: 8.15 THOUSAND/UL (ref 4.31–10.16)

## 2023-10-18 PROCEDURE — 85610 PROTHROMBIN TIME: CPT | Performed by: PHYSICIAN ASSISTANT

## 2023-10-18 PROCEDURE — 72125 CT NECK SPINE W/O DYE: CPT

## 2023-10-18 PROCEDURE — 70450 CT HEAD/BRAIN W/O DYE: CPT

## 2023-10-18 PROCEDURE — 99284 EMERGENCY DEPT VISIT MOD MDM: CPT

## 2023-10-18 PROCEDURE — 85730 THROMBOPLASTIN TIME PARTIAL: CPT | Performed by: PHYSICIAN ASSISTANT

## 2023-10-18 PROCEDURE — 85025 COMPLETE CBC W/AUTO DIFF WBC: CPT | Performed by: PHYSICIAN ASSISTANT

## 2023-10-18 PROCEDURE — 99223 1ST HOSP IP/OBS HIGH 75: CPT | Performed by: PHYSICIAN ASSISTANT

## 2023-10-18 PROCEDURE — G1004 CDSM NDSC: HCPCS

## 2023-10-18 PROCEDURE — 94664 DEMO&/EVAL PT USE INHALER: CPT

## 2023-10-18 PROCEDURE — 0241U HB NFCT DS VIR RESP RNA 4 TRGT: CPT | Performed by: PHYSICIAN ASSISTANT

## 2023-10-18 PROCEDURE — 99223 1ST HOSP IP/OBS HIGH 75: CPT | Performed by: INTERNAL MEDICINE

## 2023-10-18 PROCEDURE — 80053 COMPREHEN METABOLIC PANEL: CPT | Performed by: PHYSICIAN ASSISTANT

## 2023-10-18 PROCEDURE — 99291 CRITICAL CARE FIRST HOUR: CPT | Performed by: EMERGENCY MEDICINE

## 2023-10-18 PROCEDURE — 82948 REAGENT STRIP/BLOOD GLUCOSE: CPT

## 2023-10-18 PROCEDURE — 73130 X-RAY EXAM OF HAND: CPT

## 2023-10-18 PROCEDURE — 36415 COLL VENOUS BLD VENIPUNCTURE: CPT | Performed by: PHYSICIAN ASSISTANT

## 2023-10-18 PROCEDURE — 70486 CT MAXILLOFACIAL W/O DYE: CPT

## 2023-10-18 RX ORDER — LEVETIRACETAM 500 MG/1
500 TABLET ORAL 2 TIMES DAILY
Status: DISCONTINUED | OUTPATIENT
Start: 2023-10-18 | End: 2023-10-19 | Stop reason: HOSPADM

## 2023-10-18 RX ORDER — AMOXICILLIN 250 MG
1 CAPSULE ORAL
Status: DISCONTINUED | OUTPATIENT
Start: 2023-10-18 | End: 2023-10-19 | Stop reason: HOSPADM

## 2023-10-18 RX ORDER — ACETAMINOPHEN 325 MG/1
650 TABLET ORAL EVERY 6 HOURS SCHEDULED
Status: DISCONTINUED | OUTPATIENT
Start: 2023-10-18 | End: 2023-10-19 | Stop reason: HOSPADM

## 2023-10-18 RX ORDER — GINSENG 100 MG
1 CAPSULE ORAL ONCE
Status: COMPLETED | OUTPATIENT
Start: 2023-10-18 | End: 2023-10-18

## 2023-10-18 RX ORDER — OXYCODONE HYDROCHLORIDE 5 MG/1
5 TABLET ORAL EVERY 4 HOURS PRN
Status: DISCONTINUED | OUTPATIENT
Start: 2023-10-18 | End: 2023-10-19 | Stop reason: HOSPADM

## 2023-10-18 RX ORDER — POLYETHYLENE GLYCOL 3350 17 G/17G
17 POWDER, FOR SOLUTION ORAL DAILY
Status: DISCONTINUED | OUTPATIENT
Start: 2023-10-18 | End: 2023-10-19 | Stop reason: HOSPADM

## 2023-10-18 RX ORDER — HYDROMORPHONE HCL/PF 1 MG/ML
0.5 SYRINGE (ML) INJECTION ONCE
Status: COMPLETED | OUTPATIENT
Start: 2023-10-18 | End: 2023-10-18

## 2023-10-18 RX ORDER — HYDROMORPHONE HCL IN WATER/PF 6 MG/30 ML
0.2 PATIENT CONTROLLED ANALGESIA SYRINGE INTRAVENOUS EVERY 4 HOURS PRN
Status: DISCONTINUED | OUTPATIENT
Start: 2023-10-18 | End: 2023-10-19 | Stop reason: HOSPADM

## 2023-10-18 RX ORDER — INSULIN LISPRO 100 [IU]/ML
2-12 INJECTION, SOLUTION INTRAVENOUS; SUBCUTANEOUS
Status: DISCONTINUED | OUTPATIENT
Start: 2023-10-18 | End: 2023-10-19 | Stop reason: HOSPADM

## 2023-10-18 RX ORDER — GINSENG 100 MG
1 CAPSULE ORAL 2 TIMES DAILY
Status: DISCONTINUED | OUTPATIENT
Start: 2023-10-18 | End: 2023-10-19 | Stop reason: HOSPADM

## 2023-10-18 RX ORDER — ONDANSETRON 2 MG/ML
4 INJECTION INTRAMUSCULAR; INTRAVENOUS EVERY 6 HOURS PRN
Status: DISCONTINUED | OUTPATIENT
Start: 2023-10-18 | End: 2023-10-19 | Stop reason: HOSPADM

## 2023-10-18 RX ADMIN — SODIUM CHLORIDE 1000 ML: 0.9 INJECTION, SOLUTION INTRAVENOUS at 12:08

## 2023-10-18 RX ADMIN — SENNOSIDES, DOCUSATE SODIUM 1 TABLET: 8.6; 5 TABLET ORAL at 21:04

## 2023-10-18 RX ADMIN — OXYCODONE HYDROCHLORIDE 5 MG: 5 TABLET ORAL at 14:29

## 2023-10-18 RX ADMIN — BACITRACIN 1 LARGE APPLICATION: 500 OINTMENT TOPICAL at 19:13

## 2023-10-18 RX ADMIN — BACITRACIN ZINC 1 LARGE APPLICATION: 500 OINTMENT TOPICAL at 10:41

## 2023-10-18 RX ADMIN — INSULIN LISPRO 8 UNITS: 100 INJECTION, SOLUTION INTRAVENOUS; SUBCUTANEOUS at 19:13

## 2023-10-18 RX ADMIN — HYDROMORPHONE HYDROCHLORIDE 0.5 MG: 1 INJECTION, SOLUTION INTRAMUSCULAR; INTRAVENOUS; SUBCUTANEOUS at 11:50

## 2023-10-18 RX ADMIN — ACETAMINOPHEN 650 MG: 325 TABLET, FILM COATED ORAL at 19:12

## 2023-10-18 RX ADMIN — POLYETHYLENE GLYCOL 3350 17 G: 17 POWDER, FOR SOLUTION ORAL at 14:31

## 2023-10-18 RX ADMIN — LEVETIRACETAM 500 MG: 500 TABLET, FILM COATED ORAL at 19:12

## 2023-10-18 NOTE — TRAUMA DOCUMENTATION
Left message to call office to see how he is doing with the painful lump to back    Trauma present at bedside

## 2023-10-18 NOTE — EMTALA/ACUTE CARE TRANSFER
[address] [place] [address] [address]  [address] [address]  Dept: [phone]      EMTALA TRANSFER CONSENT    NAME Anai Owusu                                         1945                              MRN [id number]    I have been informed of my rights regarding examination, treatment, and transfer   by Dr. Kari You MD    Benefits: Specialized equipment and/or services available at the receiving facility (Include comment)________________________ (trauma and neurosurgery evaluation)    Risks:        Consent for Transfer:  I acknowledge that my medical condition has been evaluated and explained to me by the emergency department physician or other qualified medical person and/or my attending physician, who has recommended that I be transferred to the service of  Accepting Physician: Dr. Newt Primrose at [address] Name, [address] : [facility]. The above potential benefits of such transfer, the potential risks associated with such transfer, and the probable risks of not being transferred have been explained to me, and I fully understand them. The doctor has explained that, in my case, the benefits of transfer outweigh the risks. I agree to be transferred. I authorize the performance of emergency medical procedures and treatments upon me in both transit and upon arrival at the receiving facility. Additionally, I authorize the release of any and all medical records to the receiving facility and request they be transported with me, if possible. I understand that the safest mode of transportation during a medical emergency is an ambulance and that the Hospital advocates the use of this mode of transport. Risks of traveling to the receiving facility by car, including absence of medical control, life sustaining equipment, such as oxygen, and medical personnel has been explained to me and I fully understand them.     (MICK CORRECT BOX BELOW)  [  ]  I consent to the stated transfer and to be transported by ambulance/helicopter. [  ]  I consent to the stated transfer, but refuse transportation by ambulance and accept full responsibility for my transportation by car. I understand the risks of non-ambulance transfers and I exonerate the Hospital and its staff from any deterioration in my condition that results from this refusal.    X___________________________________________    DATE  10/18/23  TIME________  Signature of patient or legally responsible individual signing on patient behalf           RELATIONSHIP TO PATIENT_________________________          Provider Certification    NAME Ifrah Marcelino                                         1945                              MRN [id number]    A medical screening exam was performed on the above named patient. Based on the examination:    Condition Necessitating Transfer The primary encounter diagnosis was Fall, initial encounter. Diagnoses of Subdural hematoma ([address]), Abrasions of multiple sites, and Nasal bone fracture were also pertinent to this visit.     Patient Condition: The patient has been stabilized such that within reasonable medical probability, no material deterioration of the patient condition or the condition of the unborn child(tianna) is likely to result from the transfer    Reason for Transfer: Level of Care needed not available at this facility    Transfer Requirements: [address] [facility]   Space available and qualified personnel available for treatment as acknowledged by    Agreed to accept transfer and to provide appropriate medical treatment as acknowledged by       Dr. Ze Archer  Appropriate medical records of the examination and treatment of the patient are provided at the time of transfer   [address] _______  Transfer will be performed by qualified personnel from    and appropriate transfer equipment as required, including the use of necessary and appropriate life support measures. Provider Certification: I have examined the patient and explained the following risks and benefits of being transferred/refusing transfer to the patient/family:  General risk, such as traffic hazards, adverse weather conditions, rough terrain or turbulence, possible failure of equipment (including vehicle or aircraft), or consequences of actions of persons outside the control of the transport personnel, Unanticipated needs of medical equipment and personnel during transport, Risk of worsening condition, The possibility of a transport vehicle being unavailable      Based on these reasonable risks and benefits to the patient and/or the unborn child(tianna), and based upon the information available at the time of the patient’s examination, I certify that the medical benefits reasonably to be expected from the provision of appropriate medical treatments at another medical facility outweigh the increasing risks, if any, to the individual’s medical condition, and in the case of labor to the unborn child, from effecting the transfer.     X____________________________________________ DATE 10/18/23        TIME_______      ORIGINAL - SEND TO MEDICAL RECORDS   COPY - SEND WITH PATIENT DURING TRANSFER

## 2023-10-18 NOTE — ASSESSMENT & PLAN NOTE
- 10/18 CT facial bones: bilateral nasal bone fx  - no airway obstruction  - sinus precautions x2 weeks  - can f/u w [facility] in the outpatient setting as needed/desired

## 2023-10-18 NOTE — ASSESSMENT & PLAN NOTE
-CT head showing trace left mid superior parafalcine subdural hematoma without midline shift or mass effect  -SD2/HOT protocol  - NSGY consult, holding AC/AP, holding chemical DVT ppx until stable CTH, keppra bid x1 week  - 10/19 repeat CTh: Unchanged tiny left parafalcine subdural hematoma, maximum thickness 2 mm  - can f/u nsgy 2 wk in the outpatient setting w [facility]

## 2023-10-18 NOTE — ASSESSMENT & PLAN NOTE
Small parafalcine SDH  S/p fall from standpoint with +head strike, no history of AC / AP therapy  Also with facial fracture    Imaging:  CT head wo contrast 10/19/2023: Unchanged tiny left parafalcine subdural hematoma, maximum thickness 2 mm. Plan:  Imaging reviewed, small SDH without mass effect or compression. Nonsurgical management recommended. Repeat CT head STAT if GCS declines more than 2 points in 1 hour  SBP < 160  Hold all therapeutic doses of AC / AP therapy, no use as an outpatient  Instructed patient not to take Advil at home  AED management per primary team  Continue to monitor neuro exam  Medical management and pain control per primary team  DVT ppx:  SCDs, okay for pharmacological DVT prophylaxis  Mobilize as tolerated with assistance, PT / OT evaluation  Called son and updated    Neurosurgery will sign off. Outpatient follow-up in 2 weeks with repeat CT head. please call with questions or concerns.

## 2023-10-18 NOTE — ED PROVIDER NOTES
History  Chief Complaint   Patient presents with    Fall     Patient states he tripped and fell and slid down the driveway face first.  States he was unable to get up on his own and had to wait for a neighbor to arrive and help him. Denies LOC. Patient with multiple abrasions to face and hands. Denies taking any blood thinning medications. 70-year-old male presenting today one hour after a fall. He was coming off of a truck ramp and slipped on leaves falling forward striking the face and striking the back of both hands on pavement. He is NOT on aspirin or blood thinners. Did not lose consciousness however had a hard time getting up afterwards. Does not note any other pain other than the neck and some soreness along the face. There are many abrasions and skin tears noted to the dorsal aspect of bilateral hands as well as the face. No large lacerations noted. Patient believes he is up-to-date on tetanus. Denies numbness paresthesias, weakness, chest or abdominal pain. Differential includes but is not limited to abrasions, contusions, lacerations, fracture. Prior to Admission Medications   Prescriptions Last Dose Informant Patient Reported? Taking?   aspirin (ECOTRIN LOW STRENGTH) 81 mg EC tablet  Self No No   Sig: Take 1 tablet (81 mg total) by mouth daily Do not start before April 29, 2023.    atorvastatin (LIPITOR) 40 mg tablet  Self No No   Sig: Take 1 tablet (40 mg total) by mouth daily with dinner   Patient not taking: Reported on 5/1/2023   glimepiride (AMARYL) 4 mg tablet  Self Yes No   Sig: Take 1 tablet by mouth in the morning   ibuprofen (MOTRIN) 600 mg tablet  Self No No   Sig: Take 1 tablet (600 mg total) by mouth every 6 (six) hours as needed for mild pain WITH FOOD   meclizine (ANTIVERT) 25 mg tablet   No No   Sig: Take 1 tablet (25 mg total) by mouth every 8 (eight) hours as needed for dizziness   metFORMIN (GLUCOPHAGE) 1000 MG tablet  Self Yes No   Sig: Take 1,000 mg by mouth 2 (two) times a day   pioglitazone (ACTOS) 45 mg tablet  Self Yes No   Sig: Take 45 mg by mouth daily      Facility-Administered Medications: None       Past Medical History:   Diagnosis Date    Diabetes mellitus ([address])     Neoplasm of uncertain behavior of skin     Neck-Last assessed 06/13/17       Past Surgical History:   Procedure Laterality Date    BRAIN SURGERY      hematoma in brain    GASTRIC BYPASS      High    REPLACEMENT TOTAL KNEE BILATERAL         Family History   Problem Relation Age of Onset    Heart failure Mother         Congestive    Substance Abuse Neg Hx     Mental illness Neg Hx      I have reviewed and agree with the history as documented. E-Cigarette/Vaping    E-Cigarette Use Never User      E-Cigarette/Vaping Substances    Nicotine No     THC No     CBD No     Flavoring No     Other No     Unknown No      Social History     Tobacco Use    Smoking status: Never    Smokeless tobacco: Former   Vaping Use    Vaping Use: Never used   Substance Use Topics    Alcohol use: No     Comment: rarely    Drug use: No       Review of Systems   Constitutional: Negative. Negative for chills, fatigue and fever. HENT: Negative. Negative for congestion, postnasal drip, rhinorrhea and sore throat. Eyes: Negative. Respiratory: Negative. Negative for cough, shortness of breath and wheezing. Cardiovascular: Negative. Gastrointestinal: Negative. Negative for abdominal pain, diarrhea, nausea and vomiting. Endocrine: Negative. Genitourinary: Negative. Musculoskeletal:  Positive for neck pain. Negative for arthralgias, back pain, gait problem, joint swelling, myalgias and neck stiffness. Skin:  Positive for color change and wound. Negative for pallor and rash. Neurological: Negative. Hematological: Negative. Psychiatric/Behavioral: Negative. All other systems reviewed and are negative. Physical Exam  Physical Exam  Vitals and nursing note reviewed.    Constitutional: General: He is not in acute distress. Appearance: Normal appearance. He is well-developed and normal weight. He is not diaphoretic. HENT:      Head:        Right Ear: Tympanic membrane, ear canal and external ear normal.      Left Ear: Tympanic membrane, ear canal and external ear normal.      Nose: Nose normal.      Mouth/Throat:      Mouth: Mucous membranes are moist.      Pharynx: Oropharynx is clear. No oropharyngeal exudate. Comments: Full ROM of jaw. Eyes:      General: No scleral icterus. Right eye: No discharge. Left eye: No discharge. Conjunctiva/sclera: Conjunctivae normal.      Pupils: Pupils are equal, round, and reactive to light. Cardiovascular:      Rate and Rhythm: Normal rate and regular rhythm. Pulses: Normal pulses. Heart sounds: Normal heart sounds. No murmur heard. No friction rub. No gallop. Pulmonary:      Effort: Pulmonary effort is normal. No respiratory distress. Breath sounds: Normal breath sounds. No stridor. No wheezing, rhonchi or rales. Chest:      Chest wall: No tenderness. Abdominal:      General: Bowel sounds are normal. There is no distension. Palpations: Abdomen is soft. There is no mass. Tenderness: There is no abdominal tenderness. There is no right CVA tenderness, left CVA tenderness, guarding or rebound. Hernia: No hernia is present. Musculoskeletal:         General: Signs of injury present. Cervical back: Normal range of motion and neck supple. Lymphadenopathy:      Cervical: No cervical adenopathy. Skin:     General: Skin is warm and dry. Capillary Refill: Capillary refill takes less than 2 seconds. Coloration: Skin is not jaundiced or pale. Findings: No bruising, erythema or rash. Neurological:      General: No focal deficit present. Mental Status: He is alert and oriented to person, place, and time. Mental status is at baseline.       GCS: GCS eye subscore is 4. GCS verbal subscore is 5. GCS motor subscore is 6. Cranial Nerves: Cranial nerves 2-12 are intact. Sensory: Sensation is intact. Motor: Motor function is intact. Coordination: Coordination is intact. Gait: Gait is intact. Psychiatric:         Mood and Affect: Mood normal.         Thought Content: Thought content normal.         Judgment: Judgment normal.         Vital Signs  ED Triage Vitals   Temperature Pulse Respirations Blood Pressure SpO2   10/18/23 0950 10/18/23 0950 10/18/23 0950 10/18/23 0950 10/18/23 0950   98.2 °F (36.8 °C) 78 19 (!) 225/85 100 %      Temp Source Heart Rate Source Patient Position - Orthostatic VS BP Location FiO2 (%)   10/18/23 0950 10/18/23 0950 10/18/23 0950 10/18/23 1200 --   Tympanic Monitor Sitting Left arm       Pain Score       10/18/23 0950       10 - Worst Possible Pain           Vitals:    10/18/23 0950 10/18/23 1200   BP: (!) 225/85 147/93   Pulse: 78 72   Patient Position - Orthostatic VS: Sitting Lying         Visual Acuity      ED Medications  Medications   sodium chloride 0.9 % bolus 1,000 mL (1,000 mL Intravenous New Bag 10/18/23 1208)   bacitracin topical ointment 1 large application (1 large application Topical Given 10/18/23 1041)   HYDROmorphone (DILAUDID) injection 0.5 mg (0.5 mg Intravenous Given 10/18/23 1150)       Diagnostic Studies  Results Reviewed       Procedure Component Value Units Date/Time    Protime-INR [[id number]] Collected: 10/18/23 1208    Lab Status: In process Specimen: Blood from Arm, Right Updated: 10/18/23 1213    APTT [[id number]] Collected: 10/18/23 1208    Lab Status:  In process Specimen: Blood from Arm, Right Updated: 10/18/23 1213    Comprehensive metabolic panel [[id number]]  (Abnormal) Collected: 10/18/23 1141    Lab Status: Final result Specimen: Blood from Arm, Right Updated: 10/18/23 1210     Sodium 135 mmol/L      Potassium 4.7 mmol/L      Chloride 104 mmol/L      CO2 23 mmol/L      ANION GAP 8 mmol/L BUN 28 mg/dL      Creatinine 1.40 mg/dL      Glucose 148 mg/dL      Calcium 9.0 mg/dL      AST 15 U/L      ALT 6 U/L      Alkaline Phosphatase 85 U/L      Total Protein 7.7 g/dL      Albumin 3.9 g/dL      Total Bilirubin 0.60 mg/dL      eGFR 48 ml/min/1.73sq m     Narrative:      [facility] guidelines for Chronic Kidney Disease (CKD):     Stage 1 with normal or high GFR (GFR > 90 mL/min/1.73 square meters)    Stage 2 Mild CKD (GFR = 60-89 mL/min/1.73 square meters)    Stage 3A Moderate CKD (GFR = 45-59 mL/min/1.73 square meters)    Stage 3B Moderate CKD (GFR = 30-44 mL/min/1.73 square meters)    Stage 4 Severe CKD (GFR = 15-29 mL/min/1.73 square meters)    Stage 5 End Stage CKD (GFR <15 mL/min/1.73 square meters)  Note: GFR calculation is accurate only with a steady state creatinine    CBC and differential [[id number]]  (Abnormal) Collected: 10/18/23 1141    Lab Status: Final result Specimen: Blood from Arm, Right Updated: 10/18/23 1146     WBC 8.15 Thousand/uL      RBC 4.32 Million/uL      Hemoglobin 11.2 g/dL      Hematocrit 34.7 %      MCV 80 fL      MCH 25.9 pg      MCHC 32.3 g/dL      RDW 16.6 %      MPV 10.5 fL      Platelets 055 Thousands/uL      nRBC 0 /100 WBCs      Neutrophils Relative 83 %      Immat GRANS % 0 %      Lymphocytes Relative 11 %      Monocytes Relative 5 %      Eosinophils Relative 0 %      Basophils Relative 1 %      Neutrophils Absolute 6.73 Thousands/µL      Immature Grans Absolute 0.02 Thousand/uL      Lymphocytes Absolute 0.91 Thousands/µL      Monocytes Absolute 0.42 Thousand/µL      Eosinophils Absolute 0.03 Thousand/µL      Basophils Absolute 0.04 Thousands/µL     FLU/RSV/COVID - if FLU/RSV clinically relevant [[id number]] Collected: 10/18/23 1141    Lab Status:  In process Specimen: Nares from Nose Updated: 10/18/23 1144                   CT facial bones without contrast   Final Result by Uyen Lehman MD (10/18 1121)      Acute displaced bilateral nasal bone fractures. Globes are intact. No orbital hematoma. I personally discussed this study with Crissy Rogel on 10/18/2023 at 11:15. Workstation performed: [id number]         CT head without contrast   Final Result by Colleen Fleming MD (10/18 1116)      Trace left mid superior parafalcine subdural hematoma maximum thickness 2 mm. No acute intraparenchymal hemorrhage. No skull fracture. Stable 4 mm focal ectasia at the junction of the right A2 segment and anterior communicating artery. I personally discussed this study with Crissy Rogel on 10/18/2023 at 11:15. Workstation performed: [id number]         CT cervical spine without contrast   Final Result by Colleen Fleming MD (10/18 1122)      No cervical spine fracture or traumatic malalignment. I personally discussed this study with Crissy Rogel on 10/18/2023 at 11:15. Workstation performed: [id number]         XR hand 3+ views RIGHT    (Results Pending)   XR hand 3+ views LEFT    (Results Pending)              Procedures  Procedures         ED Course  ED Course as of 10/18/23 1217   Wed Oct 18, 2023   1118 Trace left mid superior parafalcine subdural hematoma maximum thickness 2 mm   1140 Son called states. We had a thorough conversation regarding fathers presentation. Son is largely irritable and states he can drive his father to [facility]. I said absolutely no. Patient does much better when son is not on the phone. Patient is anxious given his past history of hematomas. 1156 1215 pickup time. 1216 Hemoglobin(!): 11.2  Higher compared to 5 months ago which was 10.4   1216 Creatinine(!): 1.40  Baseline                                              Medical Decision Making  Discussed imaging with the patient and with trauma Dr. Froylan Zamorano who accepts transfer for subdural hematoma. Patient neurologically intact.  Patient has past h/o subdural hematoma needing evacuation back in 1999. Patient became anxious/ upset when talking with son who wanted to transfer patient via private vehicle. Discussed with son and patient the importance of transferring under monitor and via EMS. Patient in agreement with transfer and felt much better once off the phone with the son. Upon re-evaluated at transfer patient A&Ox3, blood pressure significantly improved, no new pain or symptoms. Discussed case with attending Dr. Mirlande Boggs who is in agreement with above assessment and plan. Hg and Creatinine at baseline compared to 5 months ago. Amount and/or Complexity of Data Reviewed  Labs: ordered. Radiology: ordered. Risk  OTC drugs. Prescription drug management. Disposition  Final diagnoses:   Fall, initial encounter   Subdural hematoma ([address])   Abrasions of multiple sites   Nasal bone fracture     Time reflects when diagnosis was documented in both MDM as applicable and the Disposition within this note       Time User Action Codes Description Comment    10/18/2023 11:27 AM Cindra Dianna Add [O02. IKJI] Fall, initial encounter     10/18/2023 11:27 AM Cindra Dianna Add [S06. 5XAA] Subdural hematoma ([address])     10/18/2023 11:27 AM Cindra Dianna Add [T07. XXXA] Abrasions of multiple sites     10/18/2023 11:27 AM Cindra Dianna Add [S02. 2XXA] Nasal bone fracture           ED Disposition       ED Disposition   Transfer to Another Facility-In Network    Condition   --    Date/Time   Wed Oct 18, 2023 11:27 AM    Comment   Qiana Green should be transferred out to [facility] Documentation      Loc [facility] Most Recent Value   Patient Condition The patient has been stabilized such that within reasonable medical probability, no material deterioration of the patient condition or the condition of the unborn child(tianna) is likely to result from the transfer   Reason for Transfer Level of Care needed not available at this facility   Benefits of Transfer Specialized equipment and/or services available at the receiving facility (Include comment)________________________  Tory Soles and neurosurgery evaluation]   Accepting Physician Dr. Gifty Dickson Name, [facility] ED   [facility]   Provider Certification General risk, such as traffic hazards, adverse weather conditions, rough terrain or turbulence, possible failure of equipment (including vehicle or aircraft), or consequences of actions of persons outside the control of the transport personnel, Unanticipated needs of medical equipment and personnel during transport, Risk of worsening condition, The possibility of a transport vehicle being unavailable          RN Documentation      [address] Name, [facility] ED          Follow-up Information    None         Patient's Medications   Discharge Prescriptions    No medications on file       No discharge procedures on file.     PDMP Review       None            ED Provider  Electronically Signed by             Dolores Mckeon PA-C  10/18/23 4123

## 2023-10-18 NOTE — CONSULTS
Consultation - Geriatric Medicine   Anai Owusu 70 y.o. male MRN: [id number]  Unit/Bed#: ED 06 Encounter: [id number]      Assessment/Plan     Ambulatory dysfunction with fall  -reportedly mechanical fall at home earlier today  -(+) head strike (-) loss of consciousness  -injuries as outlined below  -Does not require use of assist device for ambulation at baseline  -no prior hx recurrent falls  -incraesed risk future falls due to age, hx fall, impaired vision, deconditioning/debility and unfamiliar environment   -encourage good body mechanics and assist with all transfers  -keep personal items and call bell close to prevent reaching  -maintain environment free of fall hazards  -encourage appropriate footwear and adequate lighting at all times when out of bed  -PT and OT pending     Left subdural hematoma  -s/p fall at home as outlined above   -in setting of 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  -[facility] 10/18/23 reports trace left mid superior parafalcine subdural hematoma  -holding all AC/AP, not on any as o/p  -neuro checks per protocol   -repeat CTH pending for tomorrow am  -Nsx on consult     Bilateral nasal bone fractures  -s/p fall as outlined above   -noted on CT facial bones obtained on admission   -continue acute pain control  -management per trauma    Acute pain due to trauma  -recommend pain control per Geriatric pain protocol:  Tylenol 975mg Q8H scheduled  Roxicodone 2.5mg Q4H PRN moderate pain  Roxicodone 5mg Q4H PRN severe pain  Dilaudid 0.2mg Q4H PRN  -encourage addition of non-pharmacologic pain treatment including ice and frequent repositioning  -recommend  bowel regimen to prevent and treat constipation due to increased risk with acute pain and opiate pain medications    Hypertensive urgency   -bp 191/90 on initial presentation   -likely largely pain mediated, markedly improved with pain control  -continue optimization of hemodynamics     Abrasions (hands and face)  -continue local wound cares  -management per trauma     Cognitive screening  -alert and oriented, endorses mild short term memory deficits following TBI in 1999  -no prior cognitive testing on record for review   -[address] 10/18/23 images personally viewed, in addition to acute findings area of dense volume loss noted right inferior temporal area   -TSH 4.477, B12 extremely low at 76 (4/27/23) recommend recheck and supplement to goal at least 400  -consider comprehensive geriatric assessment as o/p  -encourage pt remain physically, socially and cognitively active and engaged to maintain cognitive acuity    DM-II  -A1c 8.2, given age and co-morbidities goal of approx 7.5 is not unreasonable   -maintained on strictly oral regimen as o/p  -encourage healthy lifestyle modifications and diabetic diet  -cover with ISS during hospitalization with goal glu 140-180 to reduce risk hypoglycemia   -continue close o/p f/u with PCP for ongoing age appropriate diabetic screenings and cares     B12 deficiency  -B12 extremely low at 68 (4/27/23) recommend recheck and supplement to goal at least 400 as outlined above  -remains high risk deficiency due to hx gastric bypass AND metformin use     Impaired Vision  -recommend use of corrective lenses at all appropriate times  -encourage adequate lighting and encourage use of assistance with ambulation  -keep personal belongings close to person to avoid reaching  -encourage appropriate footwear at all times  -consider large font for printed materials provided to patient     Deconditioning/debility/frailty   -clinical frailty scale stage V, midlly frail  -multifactorial including age, hx TBI, DM-II and multiple additional chronic medical co-morbidities   -continue optimization of chronic conditions and address acute derangements as arise   -continue psychosocial supports     Delirium precautions  -Patient is high risk of delirium due to age, fall, traumatic injuries, acute pain, hx TBI, hospitalization   -Initiate delirium precautions  -maintain normal sleep/wake cycle  -ensure that pain is well controlled  -monitor for fecal and urinary retention which may precipitate delirium  -encourage early mobilization and ambulation with assist when cleared to safely do so  -provide frequent reorientation and redirection as indicated and appropriate   -avoid medications which may precipitate or worsen delirium such as tramadol, benzodiazepine, anticholinergics, and benadryl  -redirect unwanted behaviors as first line    Home medication review   [facility] DR RYDER VALLES [phone]:    Amaryl 4mg  daily  Metformin 1000mg BID  Actos 45mg daily    Care coordination: rounded with nursing in ED     History of Present Illness   Physician Requesting Consult: Alex Ribeiro MD  Reason for Consult / Principal Problem: Fall  Hx and PE limited by: N/A  Additional history obtained from: Chart review and patient evaluation    HPI: Zee Stuart is a 70y.o. 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 He is seen and examined at bedside in ED where he is lying on stretcher resting, he explains that earlier today he fell off the end of a car hauler trailer ramp when he slipped on some wet leaves on the ramp of the trailer causing him to fall forward striking his head and arms sliding across the ground. He denies loss of consciousness but had a hard time getting up without assistance. He reports pain in his face but denies other complaints at this time.  Prior to admission he was residing home with his wife and was independent with ADLs and iADLs, but does endorse some short term memory difficulties since suffering a TBI in 1999. He denies use of assist device for ambulation at baseline or history of recurrent falls. He wears glasses for reading, does not use hearing aids or dentures. Inpatient consult to Gerontology  Consult performed by: Dipti Brizuela DO  Consult ordered by: Sergio Smith MD      Review of Systems   Constitutional: Negative. Negative for chills and fever. HENT: Negative. Eyes:  Positive for visual disturbance (wears glasses). Respiratory: Negative. Negative for shortness of breath. Cardiovascular: Negative. Gastrointestinal: Negative. Genitourinary: Negative. Musculoskeletal:         Facial pain    Skin: Negative. Neurological: Negative. Negative for numbness. Hematological: Negative. Psychiatric/Behavioral: Negative. All other systems reviewed and are negative.     Historical Information   Past Medical History:   Diagnosis Date    Diabetes mellitus ([address])     Neoplasm of uncertain behavior of skin     Neck-Last assessed 06/13/17     Past Surgical History:   Procedure Laterality Date    BRAIN SURGERY      hematoma in brain    GASTRIC BYPASS      High    REPLACEMENT TOTAL KNEE BILATERAL       Social History   Social History     Substance and Sexual Activity   Alcohol Use No    Comment: rarely     Social History     Substance and Sexual Activity   Drug Use No     Social History     Tobacco Use   Smoking Status Never   Smokeless Tobacco Former     Family History:   Family History   Problem Relation Age of Onset    Heart failure Mother         Congestive    Substance Abuse Neg Hx     Mental illness Neg Hx      Meds/Allergies   all current active meds have been reviewed    Allergies   Allergen Reactions    Morphine Nausea Only    Morphine      Objective   No intake or output data in the 24 hours ending 10/18/23 1338  Invasive Devices       Peripheral Intravenous Line  Duration             Peripheral IV 10/18/23 Right Antecubital <1 day                  Physical Exam  Vitals and nursing note reviewed. Constitutional:       General: He is not in acute distress. Appearance: He is obese. He is not toxic-appearing. HENT:      Head: Normocephalic. Comments: Facial abrasions      Nose: Nose normal.      Mouth/Throat:      Mouth: Mucous membranes are moist.   Eyes:      General: No scleral icterus. Right eye: No discharge. Left eye: No discharge. Conjunctiva/sclera: Conjunctivae normal.   Neck:      Comments: Phonation normal   Cardiovascular:      Rate and Rhythm: Normal rate and regular rhythm. Pulmonary:      Effort: Pulmonary effort is normal. No respiratory distress. Breath sounds: No wheezing or rales. Abdominal:      General: There is no distension. Palpations: Abdomen is soft. Tenderness: There is no abdominal tenderness. Musculoskeletal:      Cervical back: Neck supple. Right lower leg: No edema. Left lower leg: No edema. Comments: Obese body habitus, reduced overall muscle mass    Skin:     General: Skin is warm and dry. Comments: Abrasions of face and dorsum of hands bilaterally     Well healed surgical scar L knee    Neurological:      Mental Status: He is alert.       Comments: Alert and oriented, answering ques appropriately   Psychiatric:         Mood and Affect: Mood normal.         Behavior: Behavior normal.       Lab Results:     I have personally reviewed pertinent lab results including the following:    10/18/23- CBC, BMP, INR, viral panel    I have personally reviewed the following imaging study reports in PACS:    10/18/23- CTH, CT C-spine, XR R hand, XR L hand     Therapies:   PT: pending   OT: pending     VTE Prophylaxis: Sequential compression device (Venodyne)     Code Status: Level 1 - Full Code  Advance Directive and Living Will:      Power of :    POLST:      Family and Social Support: wife     Goals of Care: pain control

## 2023-10-18 NOTE — PLAN OF CARE
Problem: SAFETY ADULT  Goal: Patient will remain free of falls  Description: INTERVENTIONS:  - Educate patient/family on patient safety including physical limitations  - Instruct patient to call for assistance with activity   - Consult OT/PT to assist with strengthening/mobility   - Keep Call bell within reach  - Keep bed low and locked with side rails adjusted as appropriate  - Keep care items and personal belongings within reach  - Initiate and maintain comfort rounds  - Make Fall Risk Sign visible to staff  - Offer Toileting every 2 Hours, in advance of need  - Initiate/Maintain bed/chair alarm  - Obtain necessary fall risk management equipment:   - Apply yellow socks and bracelet for high fall risk patients  - Consider moving patient to room near nurses station  Outcome: Progressing  Goal: Maintain or return to baseline ADL function  Description: INTERVENTIONS:  -  Assess patient's ability to carry out ADLs; assess patient's baseline for ADL function and identify physical deficits which impact ability to perform ADLs (bathing, care of mouth/teeth, toileting, grooming, dressing, etc.)  - Assess/evaluate cause of self-care deficits   - Assess range of motion  - Assess patient's mobility; develop plan if impaired  - Assess patient's need for assistive devices and provide as appropriate  - Encourage maximum independence but intervene and supervise when necessary  - Involve family in performance of ADLs  - Assess for home care needs following discharge   - Consider OT consult to assist with ADL evaluation and planning for discharge  - Provide patient education as appropriate  Outcome: Progressing  Goal: Maintains/Returns to pre admission functional level  Description: INTERVENTIONS:  - Perform BMAT or MOVE assessment daily.   - Set and communicate daily mobility goal to care team and patient/family/caregiver.    - Collaborate with rehabilitation services on mobility goals if consulted  - Perform Range of Motion 3 times a day. - Reposition patient every 2 hours. - Dangle patient 3 times a day  - Stand patient 3 times a day  - Ambulate patient 3 times a day  - Out of bed to chair 3 times a day   - Out of bed for meals 3 times a day  - Out of bed for toileting  - Record patient progress and toleration of activity level   Outcome: Progressing    Problem: NEUROSENSORY - ADULT  Goal: Achieves stable or improved neurological status  Description: INTERVENTIONS  - Monitor and report changes in neurological status  - Monitor vital signs such as temperature, blood pressure, glucose, and any other labs ordered   - Initiate measures to prevent increased intracranial pressure  - Monitor for seizure activity and implement precautions if appropriate      Outcome: Progressing  Goal: Remains free of injury related to seizures activity  Description: INTERVENTIONS  - Maintain airway, patient safety  and administer oxygen as ordered  - Monitor patient for seizure activity, document and report duration and description of seizure to physician/advanced practitioner  - If seizure occurs,  ensure patient safety during seizure  - Reorient patient post seizure  - Seizure pads on all 4 side rails  - Instruct patient/family to notify RN of any seizure activity including if an aura is experienced  - Instruct patient/family to call for assistance with activity based on nursing assessment  - Administer anti-seizure medications if ordered    Outcome: Progressing  Goal: Achieves maximal functionality and self care  Description: INTERVENTIONS  - Monitor swallowing and airway patency with patient fatigue and changes in neurological status  - Encourage and assist patient to increase activity and self care.    - Encourage visually impaired, hearing impaired and aphasic patients to use assistive/communication devices  Outcome: Progressing     Problem: Knowledge Deficit  Goal: Patient/family/caregiver demonstrates understanding of disease process, treatment plan, medications, and discharge instructions  Description: Complete learning assessment and assess knowledge base.   Interventions:  - Provide teaching at level of understanding  - Provide teaching via preferred learning methods  Outcome: Progressing

## 2023-10-18 NOTE — RESPIRATORY THERAPY NOTE
RT Protocol Note  Daniela Alvarez 70 y.o. male MRN: [id number]  Unit/Bed#: [facility] 620-01 Encounter: [id number]    Assessment    Active Problems:    Closed fracture of nasal bone    Subdural hematoma (HCC)    Fall from standing    Bilateral hand pain      Home Pulmonary Medications:  None        Past Medical History:   Diagnosis Date    Diabetes mellitus ([address])     Neoplasm of uncertain behavior of skin     Neck-Last assessed 06/13/17     Social History     Socioeconomic History    Marital status: /Civil Union     Spouse name: None    Number of children: None    Years of education: None    Highest education level: None   Occupational History    None   Tobacco Use    Smoking status: Never    Smokeless tobacco: Former   Vaping Use    Vaping Use: Never used   Substance and Sexual Activity    Alcohol use: No     Comment: rarely    Drug use: No    Sexual activity: None   Other Topics Concern    None   Social History Narrative    ** Merged History Encounter **         Active advance directive    Caffeine use    Dental care Regularly         Social Determinants of Health     Financial Resource Strain: Not on file   Food Insecurity: Not on file   Transportation Needs: Not on file   Physical Activity: Not on file   Stress: Not on file   Social Connections: Not on file   Intimate Partner Violence: Not on file   Housing Stability: Not on file       Subjective         Objective    Physical Exam:   Assessment Type: Assess only  General Appearance: Alert, Awake  Respiratory Pattern: Normal  Chest Assessment: Chest expansion symmetrical  Bilateral Breath Sounds: Diminished    Vitals:  Blood pressure 122/81, pulse 80, temperature 97.7 °F (36.5 °C), temperature source Oral, resp. rate 20, height 5' 10" (1.778 m), weight 112 kg (247 lb), SpO2 (P) 97 %.           Imaging and other studies:           Plan    Respiratory Plan: Discontinue Protocol        Resp Comments: (P) pt found on ra, spo2 is 97%, bs are clear, pt takes no home resp meds, will d/c resp protocol.

## 2023-10-18 NOTE — ED NOTES
Patient noted to have scraped skin to forehead,nose and below mouth. Patient also noted to have scraped away skin to top of both hands. Face, and hands cleansed with soap and water. Areas will be dressed after bacitracin is applied.      Tyoa Collado RN  10/18/23 9000

## 2023-10-18 NOTE — H&P
H&P - Trauma   Esther Alejandre 70 y.o. male MRN: [id number]  Unit/Bed#: ED 06 Encounter: [id number]    Trauma Alert: Evaluation; trauma team notified at 115pm via text   Model of Arrival: Transfer [facility]     Trauma Team: Attending Candie Cast, Residents Jose Carlos Richmond, and Fellow [facility]  Consultants:     Neurosurgery: routine consult; Epic consult order placed; Assessment/Plan   Active Problems / Assessment:   -Status post mechanical fall at home, fell down the back of a truck ramp after slipping on a wet leaf, positive head strike, landed on his hands and face, denies loss of consciousness, no AC/AP  -CT C-spine negative  -CT head showing trace left mid superior parafalcine subdural hematoma without midline shift or mass effect  -Facial bones showing acute displaced bilateral nasal bone fractures without hematoma  -Bilateral hand x-rays negative for acute traumatic injury     Plan:   -Admit to trauma service, SD2/HOT protocol  -Neurosurgery consult, appreciate recommendations, will repeat CT head tomorrow 10/19 in the morning to reassess, holding all chemical DVT prophylaxis and AC/AP, Keppra twice daily  -Spoke with [facility] regarding nasal bone fractures, can manage symptoms and treat non op, outpatient follow up as needed, patient did express he would not want anything done anyway during interview  -Diet as tolerated  -Can apply bacitracin and local wound care for facial abrasions and hand abrasions  -PT/OT  -pain control  -bowel regimen    History of Present Illness     Chief Complaint: Facial pain, bilateral hand pain  Mechanism:Fall     HPI:    Esther Alejandre is a 70 y.o. male w PMH of DM, HLD, presenting as a trauma transfer from [facility] after a mechanical fall at his home earlier today, found on trauma work-up to have small subdural hematoma and bilateral nasal bone fractures.   Patient reports he was helping his friend do some work at home and they were using a truck with a ramp at the back of it, states he was coming down the ramp of the truck when he slipped on a wet leaf and fell forward off the truck and onto the driveway, states he landed hitting his face and his hands bilaterally thinks possibly his hands broke the fall, denies loss of consciousness, no AC/AP use. Reports some facial pain and bilateral hand pain otherwise has no complaints. Patient lives at home with his wife, ambulates well without need for a cane or walker, no recent history of falls. Patient does have notable history of traumatic brain injury requiring neurosurgical intervention in the late 90s, no acute events since that time. Hemodynamically stable on evaluation, GCS 15. Review of Systems   Constitutional:  Negative for activity change, chills and fever. Respiratory:  Negative for shortness of breath. Cardiovascular:  Negative for chest pain. Gastrointestinal:  Negative for abdominal pain, constipation, diarrhea, nausea and vomiting. Genitourinary:  Negative for difficulty urinating. Musculoskeletal:  Negative for back pain and neck pain. Skin:  Positive for wound. Negative for color change. Neurological:  Negative for dizziness, syncope, weakness, light-headedness and headaches. Psychiatric/Behavioral:  Negative for agitation and confusion. The patient is not nervous/anxious. All other systems reviewed and are negative. 12-point, complete review of systems was reviewed and negative except as stated above.      Historical Information     Past Medical History:   Diagnosis Date    Diabetes mellitus ([address])     Neoplasm of uncertain behavior of skin     Neck-Last assessed 06/13/17     Past Surgical History:   Procedure Laterality Date    BRAIN SURGERY      hematoma in brain    GASTRIC BYPASS      High    REPLACEMENT TOTAL KNEE BILATERAL          Social History     Tobacco Use    Smoking status: Never    Smokeless tobacco: Former   Vaping Use    Vaping Use: Never used   Substance Use Topics    Alcohol use: No     Comment: rarely    Drug use: No     Immunization History   Administered Date(s) Administered    COVID-19 PFIZER VACCINE 0.3 ML IM 03/01/2021, 03/22/2021     Last Tetanus: patient UTD  Family History: Non-contributory    1. Before the illness or injury that brought you to the Emergency, did you need someone to help you on a regular basis? 0=No   2. Since the illness or injury that brought you to the Emergency, have you needed more help than usual to take care of yourself? 0=No   3. Have you been hospitalized for one or more nights during the past 6 months (excluding a stay in the Emergency Department)? 0=No   4. In general, do you see well? 0=Yes   5. In general, do you have serious problems with your memory? 0=No   6. Do you take more than three different medications everyday?  1=Yes   TOTAL   1     Did you order a geriatric consult if the score was 2 or greater?: yes     Meds/Allergies   all current active meds have been reviewed     Allergies   Allergen Reactions    Morphine Nausea Only    Morphine        Objective   Initial Vitals:   Temperature: 97.7 °F (36.5 °C) (10/18/23 1307)  Pulse: 73 (10/18/23 1258)  Respirations: 20 (10/18/23 1258)  Blood Pressure: (!) 191/90 (10/18/23 1258)    Primary Survey:   Airway:        Status: patent;        Pre-hospital Interventions: none        Hospital Interventions: none  Breathing:        Pre-hospital Interventions: none       Effort: normal       Right breath sounds: normal       Left breath sounds: normal  Circulation:        Rhythm: regular       Rate: regular   Right Pulses Left Pulses    R radial: 2+    R pedal: 2+     L radial: 2+    L pedal: 2+       Disability:        GCS: Eye: 4; Verbal: 5 Motor: 6 Total: 15       Right Pupil: round;  reactive         Left Pupil:  round;  reactive      R Motor Strength L Motor Strength    R : 5/5  R dorsiflex: 5/5  R plantarflex: 5/5 L : 5/5  L dorsiflex: 5/5  L plantarflex: 5/5        Sensory:  No sensory deficit  Exposure: Completed: Yes      Secondary Survey:  Physical Exam  Vitals reviewed. Constitutional:       General: He is not in acute distress. Appearance: Normal appearance. He is not ill-appearing or toxic-appearing. HENT:      Head: Normocephalic. Mouth/Throat:      Mouth: Mucous membranes are moist.   Eyes:      Extraocular Movements: Extraocular movements intact. Cardiovascular:      Rate and Rhythm: Normal rate. Pulses: Normal pulses. Pulmonary:      Effort: Pulmonary effort is normal. No respiratory distress. Comments: No step-offs or deformities  No C/T/L-spine tenderness  Abdominal:      General: There is no distension. Palpations: Abdomen is soft. Tenderness: There is no abdominal tenderness. Musculoskeletal:         General: No swelling, tenderness, deformity or signs of injury. Normal range of motion. Cervical back: Normal range of motion. No tenderness. Comments: Bilateral hand abrasions with dressings present   Skin:     General: Skin is warm. Capillary Refill: Capillary refill takes less than 2 seconds. Coloration: Skin is not jaundiced or pale. Findings: Bruising and erythema present. Neurological:      Mental Status: He is alert and oriented to person, place, and time. Psychiatric:         Mood and Affect: Mood normal.         Invasive Devices       Peripheral Intravenous Line  Duration             Peripheral IV 10/18/23 Right Antecubital <1 day                  Lab Results: I have personally reviewed all pertinent laboratory/test results 10/18/23 and in the preceding 24 hours.   Recent Labs     10/18/23  1141 10/18/23  1208   WBC 8.15  --    HGB 11.2*  --    HCT 34.7*  --      --    SODIUM 135  --    K 4.7  --      --    CO2 23  --    BUN 28*  --    CREATININE 1.40*  --    GLUC 148*  --    AST 15  --    ALT 6*  --    ALB 3.9  --    TBILI 0.60  --    ALKPHOS 85  --    PTT  --  27   INR  --  1.04       Imaging Results: I have personally reviewed pertinent images saved in PACS. CT scan findings (and other pertinent positive findings on images) were discussed with radiology.  My interpretation of the images/reports are as follows:  Chest Xray(s): N/A   FAST exam(s): N/A   CT Scan(s): positive for acute findings: SDH, nasal bone fx   Additional Xray(s): negative for acute findings     Other Studies:      Code Status: Level 1 - Full Code  Advance Directive and Living Will:      Power of :    POLST:

## 2023-10-18 NOTE — ASSESSMENT & PLAN NOTE
- s/p fall w abrasions b/l  - XR of b/l hands negative for acute traumatic injury  - can apply bacitracin to abrasions of hands

## 2023-10-18 NOTE — ASSESSMENT & PLAN NOTE
-Status post mechanical fall at home, fell down the back of a truck ramp after slipping on a wet leaf, positive head strike, landed on his hands and face, denies loss of consciousness, no AC/AP  -CT C-spine negative  -CT head showing trace left mid superior parafalcine subdural hematoma without midline shift or mass effect  -Facial bones showing acute displaced bilateral nasal bone fractures without hematoma  -Bilateral hand x-rays negative for acute traumatic injury  -PT/OT eval for dispo planning  -CM assistance for dispo

## 2023-10-18 NOTE — CONSULTS
[facility] [facility]  Consult Note  Name: Gonzalo Terry  MRN: [id number]  [facility] Date of Admission: 10/18/2023   Date of Service: 10/18/2023 I Hospital Day: 0    Assessment/Plan   Subdural hematoma [facility])  Assessment & Plan  Small parafalcine SDH  S/p fall from standpoint with +head strike, no history of AC / AP therapy  Also with facial fracture    Imaging:  CT head wo contrast 10/18/2023: Trace left mid superior parafalcine subdural hematoma maximum thickness 2 mm. Plan:  Imaging reviewed, small SDH without mass effect or compression. Nonsurgical management recommended. Plan for repeat CT head wo contrast in the morning for stability vs STAT if GCS declines more than 2 points in 1 hour  SBP < 160  Hold all therapeutic doses of AC / AP therapy, no use as an outpatient  AED management per primary team  Continue to monitor neuro exam  Medical management and pain control per primary team  DVT ppx:  SCDs, hold pharm dvt ppx until stable exam  Mobilize as tolerated with assistance, PT / OT evaluation    Neurosurgery will review imaging once completed. Please call with questions or concerns. Bilateral hand pain  Assessment & Plan  Wound care per trauma    Fall from standing  Assessment & Plan  CT cervical spine without acute fracture  See plan above for further recommendations    Closed fracture of nasal bone  Assessment & Plan  Based on CT facial bones  OMFS consult recommended         History of Present Illness   HPI: Gregory Aquino is a 70y.o. year old male with PMH including DM, bilateral knee replacements who presents s/p fall with small SDH and facial fractures. Patient states he slipped on a leaf and feel downward on concrete face first.  He did not lose consciousness but was not able to get up on his own due to his knees. He called a friend who helped him up and drove him to the hospital for evaluation.    He has some facial discomfort but no headaches, dizziness, CP, SOB, weakness, numbness, tingling. Walks without assistive devices. No AC / AP therapy. Review of Systems   Constitutional:  Negative for chills and fever. HENT:  Negative for hearing loss and trouble swallowing. Eyes:  Negative for visual disturbance. Respiratory:  Negative for chest tightness and shortness of breath. Cardiovascular:  Negative for chest pain. Gastrointestinal:  Negative for abdominal pain, constipation, diarrhea, nausea and vomiting. Genitourinary:  Negative for difficulty urinating. Musculoskeletal:  Negative for back pain and neck pain. Hand pain   Skin:  Negative for wound. Allergic/Immunologic: Negative for environmental allergies and food allergies. Neurological:  Positive for headaches (some facial pain). Negative for dizziness, facial asymmetry, speech difficulty, weakness and numbness. Hematological:  Does not bruise/bleed easily. Psychiatric/Behavioral:  Negative for confusion.         Historical Information   Past Medical History:   Diagnosis Date    Diabetes mellitus ([address])     Neoplasm of uncertain behavior of skin     Neck-Last assessed 06/13/17     Past Surgical History:   Procedure Laterality Date    BRAIN SURGERY      hematoma in brain    GASTRIC BYPASS      High    REPLACEMENT TOTAL KNEE BILATERAL       Social History     Substance and Sexual Activity   Alcohol Use No    Comment: rarely     Social History     Substance and Sexual Activity   Drug Use No     Social History     Tobacco Use   Smoking Status Never   Smokeless Tobacco Former     Family History   Problem Relation Age of Onset    Heart failure Mother         Congestive    Substance Abuse Neg Hx     Mental illness Neg Hx        Meds/Allergies   all current active meds have been reviewed, current meds:   Current Facility-Administered Medications   Medication Dose Route Frequency    acetaminophen (TYLENOL) tablet 650 mg  650 mg Oral Q6H [address]    HYDROmorphone HCl (DILAUDID) injection 0.2 mg  0.2 mg Intravenous Q4H PRN    insulin lispro (HumaLOG) 100 units/mL subcutaneous injection 2-12 Units  2-12 Units Subcutaneous 4x Daily (AC & HS)    levETIRAcetam (KEPPRA) tablet 500 mg  500 mg Oral BID    ondansetron (ZOFRAN) injection 4 mg  4 mg Intravenous Q6H PRN    oxyCODONE (ROXICODONE) IR tablet 5 mg  5 mg Oral Q4H PRN    oxyCODONE (ROXICODONE) split tablet 2.5 mg  2.5 mg Oral Q4H PRN    polyethylene glycol (MIRALAX) packet 17 g  17 g Oral Daily    senna-docusate sodium (SENOKOT S) 8.6-50 mg per tablet 1 tablet  1 tablet Oral HS   , and PTA meds:   Prior to Admission Medications   Prescriptions Last Dose Informant Patient Reported? Taking?   aspirin (ECOTRIN LOW STRENGTH) 81 mg EC tablet  Self No No   Sig: Take 1 tablet (81 mg total) by mouth daily Do not start before April 29, 2023. atorvastatin (LIPITOR) 40 mg tablet  Self No No   Sig: Take 1 tablet (40 mg total) by mouth daily with dinner   Patient not taking: Reported on 5/1/2023   glimepiride (AMARYL) 4 mg tablet  Self Yes No   Sig: Take 1 tablet by mouth in the morning   ibuprofen (MOTRIN) 600 mg tablet  Self No No   Sig: Take 1 tablet (600 mg total) by mouth every 6 (six) hours as needed for mild pain WITH FOOD   meclizine (ANTIVERT) 25 mg tablet   No No   Sig: Take 1 tablet (25 mg total) by mouth every 8 (eight) hours as needed for dizziness   metFORMIN (GLUCOPHAGE) 1000 MG tablet  Self Yes No   Sig: Take 1,000 mg by mouth 2 (two) times a day   pioglitazone (ACTOS) 45 mg tablet  Self Yes No   Sig: Take 45 mg by mouth daily      Facility-Administered Medications: None     Allergies   Allergen Reactions    Morphine Nausea Only    Morphine        Objective   I/O       None            Physical Exam  Constitutional:       General: He is awake. Appearance: Normal appearance. HENT:      Head: Normocephalic.       Comments: Facial abrasions noted on forehead, chin and nose  Eyes:      Extraocular Movements: EOM normal. Conjunctiva/sclera: Conjunctivae normal.   Cardiovascular:      Rate and Rhythm: Normal rate. Pulmonary:      Effort: Pulmonary effort is normal. No respiratory distress. Skin:     General: Skin is warm and dry. Neurological:      Mental Status: He is alert and oriented to person, place, and time. Motor: Motor strength is normal.     Coordination: Finger-Nose-Finger Test normal.   Psychiatric:         Attention and Perception: Attention and perception normal.         Mood and Affect: Mood and affect normal.         Speech: Speech normal.         Behavior: Behavior normal. Behavior is cooperative. Thought Content: Thought content normal.         Cognition and Memory: Cognition and memory normal.         Judgment: Judgment normal.       Neurologic Exam     Mental Status   Oriented to person, place, and time. Follows 1 step commands. Attention: normal. Concentration: normal.   Speech: speech is normal   Level of consciousness: alert  Knowledge: good. Normal comprehension. Cranial Nerves     CN III, IV, VI   Extraocular motions are normal.   CN III: no CN III palsy  CN VI: no CN VI palsy  Nystagmus: none   Diplopia: none  Ophthalmoparesis: none  Upgaze: normal  Downgaze: normal  Conjugate gaze: present    CN V   Right facial sensation deficit: none  Left facial sensation deficit: none    CN VII   Right facial weakness: none  Left facial weakness: none    CN VIII   Hearing: intact    CN IX, X   CN IX normal.   CN X normal.     CN XI   Right trapezius strength: normal  Left trapezius strength: normal    CN XII   CN XII normal.     Motor Exam   Muscle bulk: normal  Overall muscle tone: normal  Right arm pronator drift: absent  Left arm pronator drift: absent    Strength   Strength 5/5 throughout.      Sensory Exam   Light touch normal.     Gait, Coordination, and Reflexes     Coordination   Finger to nose coordination: normal    Tremor   Resting tremor: absent  Intention tremor: absent  Action tremor: absent    Reflexes   Right : 2+  Left : 2+      Vitals:Blood pressure (!) 183/87, pulse 91, temperature 97.7 °F (36.5 °C), temperature source Oral, resp. rate 20, height 5' 10" (1.778 m), weight 112 kg (247 lb), SpO2 92 %. ,Body mass index is 35.44 kg/m². Lab Results:   Results from last 7 days   Lab Units 10/18/23  1141   WBC Thousand/uL 8.15   HEMOGLOBIN g/dL 11.2*   HEMATOCRIT % 34.7*   PLATELETS Thousands/uL 219   NEUTROS PCT % 83*   MONOS PCT % 5   EOS PCT % 0     Results from last 7 days   Lab Units 10/18/23  1141   POTASSIUM mmol/L 4.7   CHLORIDE mmol/L 104   CO2 mmol/L 23   BUN mg/dL 28*   CREATININE mg/dL 1.40*   CALCIUM mg/dL 9.0   ALK PHOS U/L 85   ALT U/L 6*   AST U/L 15             Results from last 7 days   Lab Units 10/18/23  1208   INR  1.04   PTT seconds 27         Imaging Studies: I have personally reviewed pertinent reports. and I have personally reviewed pertinent films in PACS     XR hand 3+ views LEFT    Result Date: 10/18/2023  Impression: Extensive arthritic changes. No acute osseous abnormality. Workstation performed: [id number]     XR hand 3+ views RIGHT    Result Date: 10/18/2023  Impression: No acute osseous abnormality. Workstation performed: [id number]     CT cervical spine without contrast    Result Date: 10/18/2023  Impression: No cervical spine fracture or traumatic malalignment. I personally discussed this study with Yahaira Cox on 10/18/2023 at 11:15. Workstation performed: [id number]     CT facial bones without contrast    Result Date: 10/18/2023  Impression: Acute displaced bilateral nasal bone fractures. Globes are intact. No orbital hematoma. I personally discussed this study with Yaahira Cox on 10/18/2023 at 11:15. Workstation performed: [id number]     CT head without contrast    Result Date: 10/18/2023  Impression: Trace left mid superior parafalcine subdural hematoma maximum thickness 2 mm. No acute intraparenchymal hemorrhage.  No skull fracture. Stable 4 mm focal ectasia at the junction of the right A2 segment and anterior communicating artery. I personally discussed this study with Roman Krause on 10/18/2023 at 11:15. Workstation performed: [id number]        EKG, Pathology, and Other Studies: I have personally reviewed pertinent reports. VTE Prophylaxis: Sequential compression device (Venodyne)  and Reason for no pharmacologic prophylaxis SDH    Code Status: Level 1 - Full Code  Advance Directive and Living Will:      Power of :    POLST:      Counseling / Coordination of Care  I spent 20 minutes with the patient.

## 2023-10-18 NOTE — ED NOTES
Scraped open skin to both hands, with bacitracin ointment applied, along with non adhesive dressing and dry dressings, then wrapped with gauze. Bacitracin ointment applied to nose, and chin area.      Toya Collado RN  10/18/23 [id number]

## 2023-10-19 ENCOUNTER — TELEPHONE (OUTPATIENT)
Dept: NEUROSURGERY | Facility: CLINIC | Age: 78
End: 2023-10-19

## 2023-10-19 ENCOUNTER — APPOINTMENT (INPATIENT)
Dept: RADIOLOGY | Facility: HOSPITAL | Age: 78
DRG: 084 | End: 2023-10-19
Payer: MEDICARE

## 2023-10-19 VITALS
SYSTOLIC BLOOD PRESSURE: 151 MMHG | OXYGEN SATURATION: 97 % | WEIGHT: 247 LBS | HEART RATE: 78 BPM | HEIGHT: 70 IN | DIASTOLIC BLOOD PRESSURE: 79 MMHG | BODY MASS INDEX: 35.36 KG/M2 | TEMPERATURE: 98.2 F | RESPIRATION RATE: 18 BRPM

## 2023-10-19 LAB
ANION GAP SERPL CALCULATED.3IONS-SCNC: 7 MMOL/L
BUN SERPL-MCNC: 27 MG/DL (ref 5–25)
CALCIUM SERPL-MCNC: 8.9 MG/DL (ref 8.4–10.2)
CHLORIDE SERPL-SCNC: 103 MMOL/L (ref 96–108)
CO2 SERPL-SCNC: 24 MMOL/L (ref 21–32)
CREAT SERPL-MCNC: 1.33 MG/DL (ref 0.6–1.3)
ERYTHROCYTE [DISTWIDTH] IN BLOOD BY AUTOMATED COUNT: 16.7 % (ref 11.6–15.1)
GFR SERPL CREATININE-BSD FRML MDRD: 51 ML/MIN/1.73SQ M
GLUCOSE SERPL-MCNC: 127 MG/DL (ref 65–140)
GLUCOSE SERPL-MCNC: 197 MG/DL (ref 65–140)
GLUCOSE SERPL-MCNC: 241 MG/DL (ref 65–140)
HCT VFR BLD AUTO: 34.7 % (ref 36.5–49.3)
HGB BLD-MCNC: 10.8 G/DL (ref 12–17)
MCH RBC QN AUTO: 25.2 PG (ref 26.8–34.3)
MCHC RBC AUTO-ENTMCNC: 31.1 G/DL (ref 31.4–37.4)
MCV RBC AUTO: 81 FL (ref 82–98)
PLATELET # BLD AUTO: 228 THOUSANDS/UL (ref 149–390)
PMV BLD AUTO: 10.6 FL (ref 8.9–12.7)
POTASSIUM SERPL-SCNC: 4.8 MMOL/L (ref 3.5–5.3)
RBC # BLD AUTO: 4.29 MILLION/UL (ref 3.88–5.62)
SODIUM SERPL-SCNC: 134 MMOL/L (ref 135–147)
WBC # BLD AUTO: 7 THOUSAND/UL (ref 4.31–10.16)

## 2023-10-19 PROCEDURE — 70450 CT HEAD/BRAIN W/O DYE: CPT

## 2023-10-19 PROCEDURE — 97166 OT EVAL MOD COMPLEX 45 MIN: CPT

## 2023-10-19 PROCEDURE — 85027 COMPLETE CBC AUTOMATED: CPT

## 2023-10-19 PROCEDURE — 99232 SBSQ HOSP IP/OBS MODERATE 35: CPT | Performed by: PHYSICIAN ASSISTANT

## 2023-10-19 PROCEDURE — 80048 BASIC METABOLIC PNL TOTAL CA: CPT

## 2023-10-19 PROCEDURE — 99238 HOSP IP/OBS DSCHRG MGMT 30/<: CPT | Performed by: EMERGENCY MEDICINE

## 2023-10-19 PROCEDURE — 97162 PT EVAL MOD COMPLEX 30 MIN: CPT

## 2023-10-19 PROCEDURE — G1004 CDSM NDSC: HCPCS

## 2023-10-19 PROCEDURE — 82948 REAGENT STRIP/BLOOD GLUCOSE: CPT

## 2023-10-19 RX ORDER — LEVETIRACETAM 500 MG/1
500 TABLET ORAL 2 TIMES DAILY
Qty: 12 TABLET | Refills: 0 | Status: SHIPPED | OUTPATIENT
Start: 2023-10-19 | End: 2023-10-25

## 2023-10-19 RX ORDER — GINSENG 100 MG
1 CAPSULE ORAL 2 TIMES DAILY
Qty: 10 G | Refills: 0 | Status: SHIPPED | OUTPATIENT
Start: 2023-10-19 | End: 2023-10-24

## 2023-10-19 RX ORDER — HEPARIN SODIUM 5000 [USP'U]/ML
5000 INJECTION, SOLUTION INTRAVENOUS; SUBCUTANEOUS EVERY 8 HOURS SCHEDULED
Status: DISCONTINUED | OUTPATIENT
Start: 2023-10-19 | End: 2023-10-19 | Stop reason: HOSPADM

## 2023-10-19 RX ADMIN — ACETAMINOPHEN 650 MG: 325 TABLET, FILM COATED ORAL at 05:01

## 2023-10-19 RX ADMIN — POLYETHYLENE GLYCOL 3350 17 G: 17 POWDER, FOR SOLUTION ORAL at 08:19

## 2023-10-19 RX ADMIN — ACETAMINOPHEN 650 MG: 325 TABLET, FILM COATED ORAL at 14:26

## 2023-10-19 RX ADMIN — BACITRACIN 1 LARGE APPLICATION: 500 OINTMENT TOPICAL at 08:18

## 2023-10-19 RX ADMIN — INSULIN LISPRO 4 UNITS: 100 INJECTION, SOLUTION INTRAVENOUS; SUBCUTANEOUS at 14:28

## 2023-10-19 RX ADMIN — ACETAMINOPHEN 650 MG: 325 TABLET, FILM COATED ORAL at 00:09

## 2023-10-19 RX ADMIN — LEVETIRACETAM 500 MG: 500 TABLET, FILM COATED ORAL at 08:18

## 2023-10-19 RX ADMIN — HEPARIN SODIUM 5000 UNITS: 5000 INJECTION INTRAVENOUS; SUBCUTANEOUS at 14:26

## 2023-10-19 NOTE — DISCHARGE INSTR - AVS FIRST PAGE
Discharge Instructions - Neurosurgery    Do not take any blood thinning medications (ie. No Advil. No motrin. No ibuprofen. No Aleve. No Aspirin. No fishoil. No heparin. No antiplatelet / no anticoagulation medication). Refrain from activity that increases chance of trauma to head or falls. Recommend you take fall precaution. No strenuous activity or sports. Return to hospital Emergency Room if you experience worsening / new headache, nausea/vomiting, speech/vision change, seizure, confusion / mental status change, weakness, or other neurological changes. Please follow up in 2 weeks with the Neurosurgical group with repeat CT head without contrast 2-3 days prior to your appointment. You may go to any [facility] facility to get your imaging done. Please call [phone] to schedule your imaging appointment.           Discharge instructions: Sinus precautions/discharge instructions for nasal bone fractures    -Ice to face as needed for 28 to 48 hours and then transition to warm compresses as needed  -Sinus precautions 4 weeks including no nose blowing, avoid putting pressure on sinus area, avoid strenuous activity, avoid straining with bowel movements, when sneezing try to sneeze with mouth open  -Can use over-the-counter nasal sprays for maximum of 3 days as needed, over-the-counter decongestions as needed, saline nasal spray as needed  -For 2 weeks avoid straws, spitting, and smoking  -Call [facility] office to schedule follow-up in the outpatient setting

## 2023-10-19 NOTE — PHYSICAL THERAPY NOTE
PHYSICAL THERAPY EVALUATION  NAME:  Taqueria Abdul  DATE: 10/19/23    AGE:   70 y.o. Mrn:   [id number]  ADMIT DX:  SDH (subdural hematoma) ([address]) [S06. 5XAA]  Fall, initial encounter [id number]. XXXA]  Multiple injuries due to trauma [T07. XXXA]    Past Medical History:   Diagnosis Date    Diabetes mellitus ([address])     Neoplasm of uncertain behavior of skin     Neck-Last assessed 06/13/17       Past Surgical History:   Procedure Laterality Date    BRAIN SURGERY      hematoma in brain    GASTRIC BYPASS      High    REPLACEMENT TOTAL KNEE BILATERAL         Length Of Stay: 1    PHYSICAL THERAPY EVALUATION:        10/19/23 1031   Note Type   Note type Evaluation   Pain Assessment   Pain Assessment Tool 0-10   Pain Score 3   Pain Location/Orientation Location: Neck   Pain Onset/Description Onset: Ongoing;Frequency: Constant/Continuous; Descriptor: Aching   Effect of Pain on Daily Activities no change in pain with activity   Patient's Stated Pain Goal No pain   Hospital Pain Intervention(s) Ambulation/increased activity;Repositioned   Restrictions/Precautions   Weight Bearing Precautions Per Order No   Other Precautions Pain   Home Living   Type of [address] [place] [address] Two level;Bed/bath upstairs;Stairs to enter with rails   Home Equipment   (none as per pt)   Additional Comments Pt reports living with spouse who is able to assist as needed   Prior Function   Level of [place] Independent with functional mobility   Lives With Spouse   Receives Help From [facility] in the last 6 months 1 to 4   Comments Pt denies the use of an AD for ambulation PTA   General   Family/Caregiver Present No   Cognition   Overall Cognitive Status WFL   Arousal/Participation Alert   Orientation Level Oriented X4   Memory Within functional limits   Following Commands Follows all commands and directions without difficulty   RUE Assessment   RUE Assessment WFL   LUE Assessment   LUE Assessment WFL   RLE Assessment   RLE Assessment WFL   LLE Assessment   LLE Assessment WFL   Bed Mobility   Supine to Sit 6  Modified independent   Transfers   Sit to Stand 5  Supervision   Additional items Increased time required   Stand to Sit 5  Supervision   Additional items Increased time required   Ambulation/Elevation   Gait pattern WNL   Gait Assistance 5  Supervision   Assistive Device None   Distance 85ft x 2   Stair Management Assistance 5  Supervision   Stair Management Technique No rails   Number of Stairs 3   Balance   Static Sitting Good   Static Standing Fair +   Ambulatory Fair   Activity Tolerance   Activity Tolerance Patient tolerated treatment well   Medical Staff Made Aware Julio Cesar Taylor, OT; OT present for co evaluation due to pts current medical presentation   Nurse Made Aware Pt appropriate to be seen and mobilize per nsg   Assessment   Prognosis Good   Problem List Decreased mobility;Pain   Assessment Pt is 70 y.o. male seen for PT evaluation s/p admit to [address] [place] [address] on 10/18/2023. Two pt identifiers were used to confirm. Pt presented s/p fall. Pt was admitted with a primary dx of: SDH, closed fx of nasal bone, and other active problems including b/l hand pain. PT now consulted for assessment of mobility and d/c needs. Pt with Up in chair orders. Pts current co morbidities affecting treatment include: has a past medical history of Diabetes mellitus ([address]) and Neoplasm of uncertain behavior of skin. Wily Negus Pts current clinical presentation is Evolving (medium complexity) due to Ongoing medical management for primary dx, Decreased activity tolerance compared to baseline, Fall risk, Continuous pulse oximetry monitoring   . Upon evaluation, pt currently is requiring Mod I for bed mobility; Supervision for transfers and Supervision for ambulation w/ no AD. Pt presents at PT eval functioning below baseline and currently w/ overall mobility deficits 2* to: pain, decreased activity tolerance compared to baseline.   At conclusion of PT session pt returned back in chair with phone and call bell within reach. Pt denies any further questions at this time. PT is currently recommending Home with family support. D/C acute care PT at this time due to pt being supervision with all mobility and having supportive spouse who is able to assist if needed. Pt denies any mobility or safety concerns about returning home at d/c. Recommend pt continues to mobilize with nsg and restorative techs during hospital stay. Barriers to Discharge None   Barriers to Discharge Comments Pt denies any mobility or safety concerns about returning home at time of d/c   Goals   Patient Goals " to go home"   Plan   PT Frequency   (DC IPPT)   Discharge Recommendation   PT Discharge Recommendation No rehabilitation needs   Equipment Recommended   (none at this time)   Additional Comments Pt denies any mobility or safety concerns about returning home at time of d/c   AM-PAC Basic Mobility Inpatient   Turning in Flat Bed Without Bedrails 4   Lying on Back to Sitting on Edge of Flat Bed Without Bedrails 4   Moving Bed to Chair 4   Standing Up From Chair Using Arms 4   Walk in Room 4   Climb 3-5 Stairs With Railing 4   Basic Mobility Inpatient Raw Score 24   Basic Mobility Standardized Score 57.68   Highest Level Of Mobility   -HLM Goal 8: Walk 250 feet or more   JH-HLM Achieved 7: Walk 25 feet or more   Modified Ad Scale   Modified Ad Scale 2   Barthel Index   Feeding 10   Bathing 5   Grooming Score 5   Dressing Score 10   Bladder Score 10   Bowels Score 10   Toilet Use Score 10   Transfers (Bed/Chair) Score 15   Mobility (Level Surface) Score 15   Stairs Score 10   Barthel Index Score 100   Portions of the documentation may have been created using voice recognition software. Occasional wrong word or sound alike substitutions may have occurred due to the inherent limitations of the voice recognition software.  Read the chart carefully and recognize, using context, where substitutions have occurred.     Eleonora Pressley, PT, DPT

## 2023-10-19 NOTE — DISCHARGE SUMMARY
[address] [facility]  Discharge- Daniela Gins 1945, 70 y.o. male MRN: [id number]  Unit/Bed#: [facility] 620-01 Encounter: [id number]  Primary Care Provider: Crystal Troy MD   Date and time admitted to hospital: 10/18/2023 12:49 PM    Bilateral hand pain  Assessment & Plan  - s/p fall w abrasions b/l  - XR of b/l hands negative for acute traumatic injury  - can apply bacitracin to abrasions of hands    Fall from standing  Assessment & Plan  -Status post mechanical fall at home, fell down the back of a truck ramp after slipping on a wet leaf, positive head strike, landed on his hands and face, denies loss of consciousness, no AC/AP  -CT C-spine negative  -CT head showing trace left mid superior parafalcine subdural hematoma without midline shift or mass effect  -Facial bones showing acute displaced bilateral nasal bone fractures without hematoma  -Bilateral hand x-rays negative for acute traumatic injury  -PT/OT eval for dispo planning  -CM assistance for dispo    Subdural hematoma (HCC)  Assessment & Plan  -CT head showing trace left mid superior parafalcine subdural hematoma without midline shift or mass effect  -SD2/HOT protocol  - NSGY consult, holding AC/AP, holding chemical DVT ppx until stable CTH, keppra bid x1 week  - 10/19 repeat CTh: Unchanged tiny left parafalcine subdural hematoma, maximum thickness 2 mm  - can f/u nsgy 2 wk in the outpatient setting w repeat CTH    Closed fracture of nasal bone  Assessment & Plan  - 10/18 CT facial bones: bilateral nasal bone fx  - no airway obstruction  - sinus precautions x2 weeks  - can f/u w OMS in the outpatient setting as needed/desired              Medical Problems       Resolved Problems  Date Reviewed: 10/19/2023   None         Admission Date:   Admission Orders (From admission, onward)       Ordered        10/18/23 1332  Inpatient Admission  Once                            Admitting Diagnosis: SDH (subdural hematoma) (HCC) [O97.4HRL]  Fall, initial encounter D[place]. XXXA]  Multiple injuries due to trauma [T07. XXXA]    HPI: 61-year-old male presenting as a trauma transfer to [facility] from [address] [place] [address] on 10/19 after mechanical fall at home when he fell down the back of a truck ramp after slipping on a wet leaf, striking his face and hands on the ground, denies loss of consciousness, no AC/AP use. Patient was found to have trace left mid superior parafalcine subdural hematoma on imaging as well as acutely displaced bilateral nasal bone fractures, hand x-rays were negative for acute traumatic injury. Patient was admitted to the trauma service, stepdown 2, hot protocol, initiated on Keppra twice daily, standard analgesic regimen. Neurosurgery was consulted for further recommendations, all AC/AP and chemical DVT prophylaxis was held. Repeat CT head on 10/19 was stable, patient was initiated on chemical DVT prophylaxis. Patient was doing well, tolerating diet without nausea or emesis, ambulating, working with PT who recommended no rehab needs after discharge. Patient will follow-up with the neurosurgery office in the outpatient setting in approximately 2 weeks with repeat CT head done before that time, also will be discharged on sinus precautions for 4 weeks and follow-up as needed with the OMS office in the outpatient setting. He can also continue to apply bacitracin/triple antibiotic ointment to his facial and hand abrasions as needed. Procedures Performed: No orders of the defined types were placed in this encounter. Summary of Hospital Course: See above HPI    Significant Findings, Care, Treatment and Services Provided: See above HPI    Complications: None    Condition at Discharge: good         Discharge instructions/Information to patient and family:   See after visit summary for information provided to patient and family.       Provisions for Follow-Up Care:  See after visit summary for information related to follow-up care and any pertinent home health orders. PCP: Sherif Deluca MD    Disposition: Home    Planned Readmission: No    Discharge Statement   I spent 30 minutes discharging the patient. This time was spent on the day of discharge. I had direct contact with the patient on the day of discharge. Additional documentation is required if more than 30 minutes were spent on discharge. Discharge Medications:  See after visit summary for reconciled discharge medications provided to patient and family. Bowel Regimen: senokot/miralax  VTE Prophylaxis:Heparin     Disposition: d/c home today    Subjective   Chief Complaint: facial pain    Subjective: Doing well, no acute events overnight, reports some continued facial pain that is well controlled with current analgesic regimen, tolerating diet without nausea or emesis, voiding, ambulating. Objective   Vitals:   Temp:  [97.2 °F (36.2 °C)-98.4 °F (36.9 °C)] 97.2 °F (36.2 °C)  HR:  [59-91] 78  Resp:  [16-20] 18  BP: (122-191)/(66-93) 149/66    I/O         10/17 0701  10/18 0700 10/18 0701  10/19 0700 10/19 0701  10/20 0700    P. O.  840 240    Total Intake(mL/kg)  840 (7.5) 240 (2.1)    Net  +840 +240           Unmeasured Urine Occurrence  2 x 1 x             Physical Exam:   General: NAD  Skin: Warm, dry, anicteric  HEENT: Normocephalic, b/l periorbital swelling, no visual deficits, nasal swelling/deformity, forehead and facial abrasions  CV: RRR, no m/r/g  Pulm: CTA b/l, no inc WOB  Abd: Soft, ND/NT  MSK: b/l hand abrasions  Neuro: AOx3, GCS 15     Invasive Devices       Peripheral Intravenous Line  Duration             Peripheral IV 10/18/23 Right Antecubital <1 day                          Lab Results: Results: I have personally reviewed all pertinent laboratory/tests results, BMP/CMP:   Lab Results   Component Value Date    SODIUM 134 (L) 10/19/2023    K 4.8 10/19/2023     10/19/2023    CO2 24 10/19/2023    BUN 27 (H) 10/19/2023    CREATININE 1.33 (H) 10/19/2023    CALCIUM 8.9 10/19/2023    AST 15 10/18/2023    ALT 6 (L) 10/18/2023    ALKPHOS 85 10/18/2023    EGFR 51 10/19/2023   , CBC:   Lab Results   Component Value Date    WBC 7.00 10/19/2023    HGB 10.8 (L) 10/19/2023    HCT 34.7 (L) 10/19/2023    MCV 81 (L) 10/19/2023     10/19/2023    RBC 4.29 10/19/2023    MCH 25.2 (L) 10/19/2023    MCHC 31.1 (L) 10/19/2023    RDW 16.7 (H) 10/19/2023    MPV 10.6 10/19/2023    NRBC 0 10/18/2023   , and Coagulation:   Lab Results   Component Value Date    INR 1.04 10/18/2023     Imaging: I have personally reviewed pertinent reports.       Other Studies:

## 2023-10-19 NOTE — OCCUPATIONAL THERAPY NOTE
Occupational Therapy Evaluation     Patient Name: Tatum Cohen  BEMVS'I Date: 10/19/2023  Problem List  Principal Problem:    Subdural hematoma ([address])  Active Problems:    Closed fracture of nasal bone    Fall from standing    Bilateral hand pain    Past Medical History  Past Medical History:   Diagnosis Date    Diabetes mellitus ([address])     Neoplasm of uncertain behavior of skin     Neck-Last assessed 06/13/17     Past Surgical History  Past Surgical History:   Procedure Laterality Date    BRAIN SURGERY      hematoma in brain    GASTRIC BYPASS      High    REPLACEMENT TOTAL KNEE BILATERAL           10/19/23 1030   OT Last Visit   OT Visit Date 10/19/23   Note Type   Note type Evaluation   Pain Assessment   Pain Assessment Tool 0-10   Pain Score 3   Pain Location/Orientation Location: Neck   Hospital Pain Intervention(s) Repositioned; Ambulation/increased activity; Emotional support;Relaxation technique   Restrictions/Precautions   Weight Bearing Precautions Per Order No   Home Living   Type of [address] [place] [address] Two level; Able to live on main level with bedroom/bathroom;Stairs to enter with rails   Prior Function   Level of [place] Independent with ADLs; Independent with functional mobility; Independent with IADLS   Lives With Spouse; Family   Receives Help From Family   IADLs Independent with driving; Independent with meal prep; Independent with medication management   Falls in the last 6 months 1 to 4   Vocational Retired   [address] and mobility - i iadls - shares homemaking with family   Reciprocal Relationships supportive family   Service to Others retired   [facility] offers no c/o   ADL   Eating Assistance 7  Independent   Grooming Assistance 5  [facility] 5  [facility] 5  [address] 5  [address] 5 Supervision/Setup   Toileting Assistance  5  Supervision/Setup   Bed Mobility   Supine to Sit 5  Supervision   Transfers   Sit to Stand 5  Supervision   Stand to Sit 5  Supervision   Functional Mobility   Functional Mobility 5  Supervision   Balance   Static Sitting Good   Dynamic Sitting Fair +   Static Standing Fair +   Dynamic Standing Fair   Ambulatory Fair   Activity Tolerance   Activity Tolerance Patient tolerated treatment well   RUE Assessment   RUE Assessment WFL   LUE Assessment   LUE Assessment WFL   Cognition   Overall Cognitive Status WFL   Assessment   Limitation Decreased ADL status; Decreased endurance;Decreased self-care trans;Decreased high-level ADLs   Prognosis Good   Assessment Pt is a 70 y.o. male who was admitted to [address] [place] [address] on 10/18/2023 with Subdural hematoma (HCC) s/p slip and fall on truck ramp. Patient  has a past medical history of Diabetes mellitus ([address]) and Neoplasm of uncertain behavior of skin. At baseline pt was completing adls and mobility independently - I iadls - shares homemaking with family. Pt lives with spouse/family in 2 story home with [facility]. Currently pt requires sba for overall ADLS and sba for functional mobility/transfers. Pt currently presents with impairments in the following categories -steps to enter environment, difficulty performing IADLS , and environment activity tolerance, endurance, and standing balance/tolerance. These impairments, as well as pt's fatigue and pain  limit pt's ability to safely engage in all baseline areas of occupation, includingfunctional mobility/transfers, community mobility, laundry , driving, house maintenance, meal prep, cleaning, social participation , and leisure activities  however has supportive family who are able to provide assist prn - From OT standpoint, recommend home with family support  upon D/C.   No immediate acute OT needs indicated - d/c from caseload   Goals   Patient Goals go home   Plan   OT Frequency Eval only   Discharge Recommendation   OT Discharge Recommendation No rehabilitation needs   AM-PAC Daily Activity Inpatient   Lower Body Dressing 3   Bathing 3   Toileting 4   Upper Body Dressing 4   Grooming 4   Eating 4   Daily Activity Raw Score 22   Daily Activity Standardized Score (Calc for Raw Score >=11) 47. 1   AM-PAC Applied Cognition Inpatient   Following a Speech/Presentation 4   Understanding Ordinary Conversation 4   Taking Medications 4   Remembering Where Things Are Placed or Put Away 4   Remembering List of 4-5 Errands 4   Taking Care of Complicated Tasks 4   Applied Cognition Raw Score 24   Applied Cognition Standardized Score 62.21   End of Consult   Education Provided Yes   Patient Position at End of Consult Bedside chair; All needs within reach   Nurse Communication Nurse aware of brace application       The patient's raw score on the AM-PAC Daily Activity Inpatient Short Form is 22. A raw score of greater than or equal to 19 suggests the patient may benefit from discharge to home. Please refer to the recommendation of the Occupational Therapist for safe discharge planning.     [facility]

## 2023-10-19 NOTE — PROGRESS NOTES
[facility]  Progress Note  Name: Milena Estrada  MRN: [id number]  Unit/Bed#: [facility] 620-01 I Date of Admission: 10/18/2023   Date of Service: 10/19/2023 I Hospital Day: 1    Assessment/Plan   * Subdural hematoma [facility])  Assessment & Plan  Small parafalcine SDH  S/p fall from standpoint with +head strike, no history of AC / AP therapy  Also with facial fracture    Imaging:  CT head wo contrast 10/19/2023: Unchanged tiny left parafalcine subdural hematoma, maximum thickness 2 mm. Plan:  Imaging reviewed, small SDH without mass effect or compression. Nonsurgical management recommended. Repeat CT head STAT if GCS declines more than 2 points in 1 hour  SBP < 160  Hold all therapeutic doses of AC / AP therapy, no use as an outpatient  Instructed patient not to take Advil at home  AED management per primary team  Continue to monitor neuro exam  Medical management and pain control per primary team  DVT ppx:  SCDs, okay for pharmacological DVT prophylaxis  Mobilize as tolerated with assistance, PT / OT evaluation  Called son and updated    Neurosurgery will sign off. Outpatient follow-up in 2 weeks with repeat CT head. please call with questions or concerns. Bilateral hand pain  Assessment & Plan  Wound care per trauma    Fall from standing  Assessment & Plan  CT cervical spine without acute fracture  See plan above for further recommendations    Closed fracture of nasal bone  Assessment & Plan  Based on CT facial bones  OMFS following, sinus precautions, no need for follow up / intervention         Subjective/Objective   Chief Complaint: " I am good"    Subjective: Patient states he is doing well today, not having much in the way of headaches or facial pain unless he scrunches his face, states his hands feel good, having a little bit of left shoulder pain. Wonders when he will be able to go home.     Objective: Sitting up in chair, talking on the phone, NAD    I/O 10/17 0701  10/18 0700 10/18 0701  10/19 0700 10/19 0701  10/20 0700    P. O.  840 240    Total Intake(mL/kg)  840 (7.5) 240 (2.1)    Net  +840 +240           Unmeasured Urine Occurrence  2 x 2 x    Unmeasured Stool Occurrence   0 x            Invasive Devices       Peripheral Intravenous Line  Duration             Peripheral IV 10/18/23 Right Antecubital <1 day                    Physical Exam:  Vitals: Blood pressure 149/66, pulse 78, temperature (!) 97.2 °F (36.2 °C), resp. rate 18, height 5' 10" (1.778 m), weight 112 kg (247 lb), SpO2 97 %. ,Body mass index is 35.44 kg/m². General appearance: alert, appears stated age, cooperative and no distress  Head: Facial abrasions noted  Eyes: Conjugate gaze  Neck: supple, symmetrical, trachea midline   Lungs: non labored breathing  Heart: regular heart rate  Neurologic:   Mental status: Alert, oriented x3, follows commands, thought content appropriate  Cranial nerves: grossly intact (Cranial nerves II-XII)  Motor: moving all extremities without focal weakness  Coordination: finger to nose normal bilaterally, no drift bilaterally      Lab Results:  Results from last 7 days   Lab Units 10/19/23  0459 10/18/23  1141   WBC Thousand/uL 7.00 8.15   HEMOGLOBIN g/dL 10.8* 11.2*   HEMATOCRIT % 34.7* 34.7*   PLATELETS Thousands/uL 228 219   NEUTROS PCT %  --  83*   MONOS PCT %  --  5   EOS PCT %  --  0     Results from last 7 days   Lab Units 10/19/23  0459 10/18/23  1141   SODIUM mmol/L 134* 135   POTASSIUM mmol/L 4.8 4.7   CHLORIDE mmol/L 103 104   CO2 mmol/L 24 23   BUN mg/dL 27* 28*   CREATININE mg/dL 1.33* 1.40*   CALCIUM mg/dL 8.9 9.0   ALK PHOS U/L  --  85   ALT U/L  --  6*   AST U/L  --  15             Results from last 7 days   Lab Units 10/18/23  1208   INR  1.04   PTT seconds 27         Imaging Studies: I have personally reviewed pertinent reports.    and I have personally reviewed pertinent films in PACS    CT head wo contrast    Result Date: 10/19/2023  Impression: Unchanged tiny left parafalcine subdural hematoma, maximum thickness 2 mm (series 2 image 40 and series 400 image 51.) Workstation performed: [id number]     XR hand 3+ views LEFT    Result Date: 10/18/2023  Impression: Extensive arthritic changes. No acute osseous abnormality. Workstation performed: [id number]     XR hand 3+ views RIGHT    Result Date: 10/18/2023  Impression: No acute osseous abnormality. Workstation performed: [id number]     CT cervical spine without contrast    Result Date: 10/18/2023  Impression: No cervical spine fracture or traumatic malalignment. I personally discussed this study with Rosanne Brown on 10/18/2023 at 11:15. Workstation performed: [id number]     CT facial bones without contrast    Result Date: 10/18/2023  Impression: Acute displaced bilateral nasal bone fractures. Globes are intact. No orbital hematoma. I personally discussed this study with Rosanne Brown on 10/18/2023 at 11:15. Workstation performed: [id number]     CT head without contrast    Result Date: 10/18/2023  Impression: Trace left mid superior parafalcine subdural hematoma maximum thickness 2 mm. No acute intraparenchymal hemorrhage. No skull fracture. Stable 4 mm focal ectasia at the junction of the right A2 segment and anterior communicating artery. I personally discussed this study with Rosanne Brown on 10/18/2023 at 11:15. Workstation performed: [id number]       EKG, Pathology, and Other Studies: I have personally reviewed pertinent reports.       VTE Pharmacologic Prophylaxis: okay for pharm dvt px    VTE Mechanical Prophylaxis: sequential compression device

## 2023-10-19 NOTE — PLAN OF CARE
Problem: INFECTION - ADULT  Goal: Absence or prevention of progression during hospitalization  Description: INTERVENTIONS:  - Assess and monitor for signs and symptoms of infection  - Monitor lab/diagnostic results  - Monitor all insertion sites, i.e. indwelling lines, tubes, and drains  - Monitor endotracheal if appropriate and nasal secretions for changes in amount and color  - Sutton appropriate cooling/warming therapies per order  - Administer medications as ordered  - Instruct and encourage patient and family to use good hand hygiene technique  - Identify and instruct in appropriate isolation precautions for identified infection/condition  Outcome: Progressing  Goal: Absence of fever/infection during neutropenic period  Description: INTERVENTIONS:  - Monitor WBC    Outcome: Progressing     Problem: SAFETY ADULT  Goal: Patient will remain free of falls  Description: INTERVENTIONS:  - Educate patient/family on patient safety including physical limitations  - Instruct patient to call for assistance with activity   - Consult OT/PT to assist with strengthening/mobility   - Keep Call bell within reach  - Keep bed low and locked with side rails adjusted as appropriate  - Keep care items and personal belongings within reach  - Initiate and maintain comfort rounds  - Make Fall Risk Sign visible to staff  - Offer Toileting every 2 Hours, in advance of need  - Initiate/Maintain bed/chair alarm  - Obtain necessary fall risk management equipment:   - Apply yellow socks and bracelet for high fall risk patients  - Consider moving patient to room near nurses station  Outcome: Progressing  Goal: Maintain or return to baseline ADL function  Description: INTERVENTIONS:  -  Assess patient's ability to carry out ADLs; assess patient's baseline for ADL function and identify physical deficits which impact ability to perform ADLs (bathing, care of mouth/teeth, toileting, grooming, dressing, etc.)  - Assess/evaluate cause of self-care deficits   - Assess range of motion  - Assess patient's mobility; develop plan if impaired  - Assess patient's need for assistive devices and provide as appropriate  - Encourage maximum independence but intervene and supervise when necessary  - Involve family in performance of ADLs  - Assess for home care needs following discharge   - Consider OT consult to assist with ADL evaluation and planning for discharge  - Provide patient education as appropriate  Outcome: Progressing  Goal: Maintains/Returns to pre admission functional level  Description: INTERVENTIONS:  - Perform BMAT or MOVE assessment daily.   - Set and communicate daily mobility goal to care team and patient/family/caregiver. - Collaborate with rehabilitation services on mobility goals if consulted  - Perform Range of Motion 3 times a day. - Reposition patient every 2 hours.   - Dangle patient 3 times a day  - Stand patient 3 times a day  - Ambulate patient 3 times a day  - Out of bed to chair 3 times a day   - Out of bed for meals 3 times a day  - Out of bed for toileting  - Record patient progress and toleration of activity level   Outcome: Progressing

## 2023-10-19 NOTE — TELEPHONE ENCOUNTER
10/19/2023-PT [address]  11/06/2023 APT IN [facility] W/CT HEAD      Justin Baxter PA-C   2-week hospital follow-up with AP and CT head for small subdural    Pennie Werner PA-C   Week hospital follow-up with AP and CT head for subdural hematoma. Please make at [facility] if possible.

## 2023-10-20 ENCOUNTER — TRANSITIONAL CARE MANAGEMENT (OUTPATIENT)
Dept: FAMILY MEDICINE CLINIC | Facility: CLINIC | Age: 78
End: 2023-10-20

## 2023-10-23 ENCOUNTER — OFFICE VISIT (OUTPATIENT)
Dept: FAMILY MEDICINE CLINIC | Facility: CLINIC | Age: 78
End: 2023-10-23
Payer: MEDICARE

## 2023-10-23 VITALS
OXYGEN SATURATION: 98 % | DIASTOLIC BLOOD PRESSURE: 82 MMHG | HEART RATE: 85 BPM | TEMPERATURE: 97.1 F | SYSTOLIC BLOOD PRESSURE: 124 MMHG | RESPIRATION RATE: 18 BRPM | WEIGHT: 254 LBS | BODY MASS INDEX: 36.45 KG/M2

## 2023-10-23 DIAGNOSIS — Z76.89 ENCOUNTER FOR SUPPORT AND COORDINATION OF TRANSITION OF CARE: Primary | ICD-10-CM

## 2023-10-23 DIAGNOSIS — S02.2XXA CLOSED FRACTURE OF NASAL BONE, INITIAL ENCOUNTER: ICD-10-CM

## 2023-10-23 DIAGNOSIS — S06.5XAA SUBDURAL HEMATOMA (HCC): ICD-10-CM

## 2023-10-23 PROCEDURE — 99496 TRANSJ CARE MGMT HIGH F2F 7D: CPT | Performed by: STUDENT IN AN ORGANIZED HEALTH CARE EDUCATION/TRAINING PROGRAM

## 2023-10-23 NOTE — ASSESSMENT & PLAN NOTE
S/p fall 10/18. Patient transported to [facility] for trauma. CT head done on 10/19 showed Unchanged tiny left parafalcine subdural hematoma.  Neurosurgery consulted while admitted and no surgery required.   -Neuro exam at baseline  -Advised wife to monitor for any neurological changes and to proceed to ER if this occurs  -Patient is on Keppra, last dose is on Weds  -F/u Neurosurgery and imaging on Nov 6

## 2023-10-23 NOTE — PROGRESS NOTES
Name: Salvador Cornejo      : 1945      MRN: [id number]  Encounter Provider: Jose Elias Langford MD  Encounter Date: 10/23/2023   Encounter department: [facility]     1. Encounter for support and coordination of transition of care    2. Subdural hematoma [facility])  Assessment & Plan:  S/p fall 10/18. Patient transported to [facility] for trauma. CT head done on 10/19 showed Unchanged tiny left parafalcine subdural hematoma. Neurosurgery consulted while admitted and no surgery required.   -Neuro exam at baseline  -Advised wife to monitor for any neurological changes and to proceed to ER if this occurs  -Patient is on Keppra, last dose is on   -/u Neurosurgery and imaging on       3. Closed fracture of nasal bone, initial encounter  Assessment & Plan:  -Follow up with ENT outpatient  -Denies nasal discharge and pain         TCM Call     Date and time call was made  10/20/2023  9:36 AM    Hospital care reviewed  Records reviewed    Patient was hospitialized at  [facility]    Date of Admission  10/18/23    Date of discharge  10/19/23    Diagnosis  Subdurmal Hematoma    Disposition  Home    Were the patients medications reviewed and updated  Yes    Current Symptoms  None      TCM Call     Post hospital issues  None    Should patient be enrolled in anticoag monitoring? No    Scheduled for follow up?   Yes    Patients specialists  Neurologist    Other specialists names  ENT    Did you obtain your prescribed medications  Yes    Do you need help managing your prescriptions or medications  No    Is transportation to your appointment needed  No    I have advised the patient to call PCP with any new or worsening symptoms  Marleny Mcallister [address] [place] [address]  or Betty other    Support System  Partner    The type of support provided  Emotional    Do you have social support  Yes, as much as I need    Are you recieving any outpatient services No    Are you recieving home care services  No    Types of home care services  None    Are you using any community resources  No    Current waiver services  No    Have you fallen in the last 12 months  Yes    How many times  1    Interperter language line needed  No    Counseling  Family    Counseling topics  Activities of daily living          Subjective      HPI    Patient presents for TCM after falling and hitting his head on 10/18 with his wife. Taken to [facility] and admitted under trauma service for broken facial bones and subdural hematoma. Neurosurgery was consulted during admission and surgery was not done due to no midline shift or mass effect seen on imaging. Patient to follow up with neurosurgery in 2 weeks along with doing repeat CT. He also suffered from some facial bone fractures for which he will follow up with ENT. Discharged on 10/19 and he is taking Keppra until Weds for avoid brain swelling. He is in good spirits. Review of Systems   Constitutional:  Positive for activity change. Negative for appetite change, chills, fatigue and fever. HENT:  Negative for congestion, postnasal drip, rhinorrhea, sinus pressure and sinus pain. Respiratory:  Negative for cough, shortness of breath and wheezing. Cardiovascular:  Negative for chest pain, palpitations and leg swelling. Gastrointestinal:  Negative for abdominal pain, constipation, diarrhea, nausea and vomiting. Musculoskeletal:  Positive for arthralgias. Skin:  Positive for wound. Neurological:  Negative for light-headedness and headaches. Psychiatric/Behavioral:  The patient is not nervous/anxious.         Current Outpatient Medications on File Prior to Visit   Medication Sig   • bacitracin topical ointment 500 units/g topical ointment Apply 1 large application topically 2 (two) times a day for 5 days   • glimepiride (AMARYL) 4 mg tablet Take 1 tablet by mouth in the morning   • levETIRAcetam (KEPPRA) 500 mg tablet Take 1 tablet (500 mg total) by mouth 2 (two) times a day for 6 days   • meclizine (ANTIVERT) 25 mg tablet Take 1 tablet (25 mg total) by mouth every 8 (eight) hours as needed for dizziness   • metFORMIN (GLUCOPHAGE) 1000 MG tablet Take 1,000 mg by mouth 2 (two) times a day   • pioglitazone (ACTOS) 45 mg tablet Take 45 mg by mouth daily   • [DISCONTINUED] aspirin (ECOTRIN LOW STRENGTH) 81 mg EC tablet Take 1 tablet (81 mg total) by mouth daily Do not start before April 29, 2023. Objective     /82 (BP Location: Right arm, Patient Position: Sitting, Cuff Size: Extra-Large)   Pulse 85   Temp (!) 97.1 °F (36.2 °C) (Temporal)   Resp 18   Wt 115 kg (254 lb)   SpO2 98%   BMI 36.45 kg/m²     Physical Exam  Constitutional:       Appearance: Normal appearance. Comments: Multiple abrasions on face, healing   HENT:      Head: Normocephalic and atraumatic. Nose: Nasal deformity present. No congestion or rhinorrhea. Cardiovascular:      Rate and Rhythm: Normal rate and regular rhythm. Pulses: Normal pulses. Heart sounds: Normal heart sounds. Pulmonary:      Effort: Pulmonary effort is normal.      Breath sounds: Normal breath sounds. Musculoskeletal:      Right shoulder: Normal.      Left shoulder: No tenderness or crepitus. Decreased range of motion. Normal strength. Neurological:      General: No focal deficit present. Mental Status: He is alert and oriented to person, place, and time. Psychiatric:         Mood and Affect: Mood normal.         Behavior: Behavior normal.         Thought Content:  Thought content normal.         Judgment: Judgment normal.       Yari Adhikari MD

## 2023-11-03 ENCOUNTER — HOSPITAL ENCOUNTER (OUTPATIENT)
Dept: RADIOLOGY | Facility: HOSPITAL | Age: 78
Discharge: HOME | End: 2023-11-03
Payer: MEDICARE

## 2023-11-03 ENCOUNTER — TELEPHONE (OUTPATIENT)
Dept: NEUROSURGERY | Facility: CLINIC | Age: 78
End: 2023-11-03

## 2023-11-03 DIAGNOSIS — S06.5XAA SUBDURAL HEMATOMA (HCC): ICD-10-CM

## 2023-11-03 PROCEDURE — 70450 CT HEAD/BRAIN W/O DYE: CPT

## 2023-11-03 PROCEDURE — G1004 CDSM NDSC: HCPCS

## 2023-11-06 ENCOUNTER — OFFICE VISIT (OUTPATIENT)
Dept: NEUROSURGERY | Facility: CLINIC | Age: 78
End: 2023-11-06
Payer: MEDICARE

## 2023-11-06 VITALS
BODY MASS INDEX: 36.36 KG/M2 | TEMPERATURE: 98.2 F | DIASTOLIC BLOOD PRESSURE: 82 MMHG | RESPIRATION RATE: 14 BRPM | HEIGHT: 70 IN | SYSTOLIC BLOOD PRESSURE: 138 MMHG | OXYGEN SATURATION: 95 % | WEIGHT: 254 LBS | HEART RATE: 88 BPM

## 2023-11-06 DIAGNOSIS — S06.5XAA SUBDURAL HEMATOMA (HCC): Primary | ICD-10-CM

## 2023-11-06 PROCEDURE — 99213 OFFICE O/P EST LOW 20 MIN: CPT | Performed by: PHYSICIAN ASSISTANT

## 2023-11-06 NOTE — ASSESSMENT & PLAN NOTE
Small parafalcine SDH  S/p fall from standpoint with +head strike 10/18/23, no history of AC / AP therapy  Also with facial fracture    Imaging:  CT head wo contrast, 11/3/23: Resolved left parafalcine subdural hematoma.  No new acute intracranial abnormality     Plan:  Continue to monitor neurological exam  Imaging reviewed with patient and his wife, showing resolution of small SDH  Ok to resume any AC/AP medications and NSAIDS  Ok to resume normal activity  Follow up on an as needed basis

## 2023-11-06 NOTE — PROGRESS NOTES
Neurosurgery Office Note  oJhn Boogie 70 y.o. male MRN: [id number]      Assessment/Plan     Subdural hematoma ([address])  Small parafalcine SDH  S/p fall from standpoint with +head strike 10/18/23, no history of AC / AP therapy  Also with facial fracture    Imaging:  CT head wo contrast, 11/3/23: Resolved left parafalcine subdural hematoma. No new acute intracranial abnormality     Plan:  Continue to monitor neurological exam  Imaging reviewed with patient and his wife, showing resolution of small SDH  Ok to resume any AC/AP medications and NSAIDS  Ok to resume normal activity  Follow up on an as needed basis       Diagnoses and all orders for this visit:    Subdural hematoma ([address])          I have spent a total time of 25 minutes on 11/06/23 in caring for this patient including Diagnostic results, Prognosis, Risks and benefits of tx options, Instructions for management, Patient and family education, Importance of tx compliance, Risk factor reductions, Impressions, Counseling / Coordination of care, Documenting in the medical record, Reviewing / ordering tests, medicine, procedures  , Obtaining or reviewing history  , and Communicating with other healthcare professionals . CHIEF COMPLAINT    Chief Complaint   Patient presents with    Follow-up     [facility] F/U W/CT HEAD SCHE 11/3       HISTORY    This is a 70y.o. year old male with PMH including DM, bilateral knee replacements who presents for 2 week follow up s/p fall on 10/18/23 with small SDH and facial fractures. Patient states he slipped on a leaf and feel downward on concrete face first.  He did not lose consciousness but was not able to get up on his own due to his knees. He called a friend who helped him up and drove him to the hospital for evaluation. He has no complaints at this time. Specifically, he denies headaches, dizziness, CP, SOB, weakness, numbness, tingling. Walks without assistive devices. No AC / AP therapy.           See Discussion    REVIEW OF SYSTEMS    Review of Systems   HENT:  Positive for dental problem (lose tooth) and hearing loss. Eyes:         Wears glasses      Neurological:  Positive for light-headedness (not new problem). [facility] F/U W/CT HEAD SCHE 11/3    -Status post mechanical fall at home, fell down the back of a truck ramp after slipping on a wet leaf, positive head strike, landed on his hands and face, denies loss of conscious   All other systems reviewed and are negative. ROS obtained by MA. Reviewed. See HPI. ROS was personally reviewed and changes made as needed     Meds/Allergies     Current Outpatient Medications   Medication Sig Dispense Refill    glimepiride (AMARYL) 4 mg tablet Take 1 tablet by mouth in the morning      metFORMIN (GLUCOPHAGE) 1000 MG tablet Take 1,000 mg by mouth 2 (two) times a day      pioglitazone (ACTOS) 45 mg tablet Take 45 mg by mouth daily      bacitracin topical ointment 500 units/g topical ointment Apply 1 large application topically 2 (two) times a day for 5 days (Patient not taking: Reported on 11/6/2023) 10 g 0    levETIRAcetam (KEPPRA) 500 mg tablet Take 1 tablet (500 mg total) by mouth 2 (two) times a day for 6 days (Patient not taking: Reported on 11/6/2023) 12 tablet 0    meclizine (ANTIVERT) 25 mg tablet Take 1 tablet (25 mg total) by mouth every 8 (eight) hours as needed for dizziness 42 tablet 1     No current facility-administered medications for this visit.        Allergies   Allergen Reactions    Morphine Nausea Only       PAST HISTORY    Past Medical History:   Diagnosis Date    Diabetes mellitus ([address])     Neoplasm of uncertain behavior of skin     Neck-Last assessed 06/13/17       Past Surgical History:   Procedure Laterality Date    BRAIN SURGERY      hematoma in brain    GASTRIC BYPASS      High    REPLACEMENT TOTAL KNEE BILATERAL         Social History     Tobacco Use    Smoking status: Never    Smokeless tobacco: Former   Vaping Use    Vaping Use: Never used   Substance Use Topics    Alcohol use: No     Comment: rarely    Drug use: No       Family History   Problem Relation Age of Onset    Heart failure Mother         Congestive    Substance Abuse Neg Hx     Mental illness Neg Hx          Above history personally reviewed. EXAM    Vitals:Blood pressure 138/82, pulse 88, temperature 98.2 °F (36.8 °C), temperature source Temporal, resp. rate 14, height 5' 10" (1.778 m), weight 115 kg (254 lb), SpO2 95 %. ,Body mass index is 36.45 kg/m². Physical Exam  Vitals and nursing note reviewed. Constitutional:       Appearance: Normal appearance. He is well-developed and normal weight. HENT:      Head: Normocephalic and atraumatic. Eyes:      Extraocular Movements: Extraocular movements intact. Pupils: Pupils are equal, round, and reactive to light. Cardiovascular:      Rate and Rhythm: Normal rate. Pulmonary:      Effort: Pulmonary effort is normal. No respiratory distress. Abdominal:      Palpations: Abdomen is soft. Musculoskeletal:         General: Normal range of motion. Cervical back: Normal range of motion. Skin:     General: Skin is warm and dry. Neurological:      Mental Status: He is alert and oriented to person, place, and time. Cranial Nerves: Cranial nerves 2-12 are intact. Motor: Motor strength is normal.     Coordination: Finger-Nose-Finger Test normal.      Gait: Gait is intact. Psychiatric:         Mood and Affect: Mood normal.         Speech: Speech normal.         Behavior: Behavior normal.         Thought Content: Thought content normal.         Judgment: Judgment normal.         Neurologic Exam     Mental Status   Oriented to person, place, and time. Follows 2 step commands. Attention: normal. Concentration: normal.   Speech: speech is normal   Level of consciousness: alert  Knowledge: good. Able to repeat. Normal comprehension. Cranial Nerves   Cranial nerves II through XII intact. CN III, IV, VI   Pupils are equal, round, and reactive to light. Motor Exam   Muscle bulk: normal  Overall muscle tone: normal  Right arm pronator drift: absent  Left arm pronator drift: absent    Strength   Strength 5/5 throughout. Sensory Exam   Light touch normal.     Gait, Coordination, and Reflexes     Gait  Gait: normal    Coordination   Finger to nose coordination: normal    Tremor   Resting tremor: absent    Reflexes   Right Rowland: absent  Left Rowland: absent        MEDICAL DECISION MAKING    Imaging Studies:     CT head wo contrast    Result Date: 11/3/2023  Narrative: CT BRAIN - WITHOUT CONTRAST INDICATION:   S06. 5XAA: Traumatic subdural hemorrhage with loss of consciousness status unknown, initial encounter. Follow-up subdural; Subdural hemorrhage; COMPARISON: CT head without contrast 10/19/2023, 10/18/2023. CT facial bones without contrast 10/18/2023. MRI brain without contrast 4/28/2023. CTA head and neck with and without contrast 4/27/2023. TECHNIQUE:  CT examination of the brain was performed. Multiplanar 2D reformatted images were created from the source data. Radiation dose length product (DLP) for this visit:  836.23 mGy-cm . This examination, like all CT scans performed in the [facility], was performed utilizing techniques to minimize radiation dose exposure, including the use of iterative  reconstruction and automated exposure control. IMAGE QUALITY:  Diagnostic. FINDINGS: PARENCHYMA AND EXTRA-AXIAL SPACES: Resolved left parafalcine subdural hematoma. No new or enlarging sites of acute intracranial hemorrhage. Mild nonspecific periventricular hypodensities, likely chronic microangiopathy. No intra-axial mass. No midline shift. No CT signs of acute infarction. Unchanged arachnoid cyst in right middle cranial fossa with some heterogeneity anteriorly and unchanged mass effect on right anterior temporal lobe.  Unchanged small right retrocerebellar arachnoid cyst. Unchanged chronic small hygroma in right lateral posterior fossa. Arterial calcifications of carotid siphons. Age-appropriate cerebral volume loss. VENTRICLES:  Normal for the patient's age. VISUALIZED ORBITS: Normal visualized orbits. PARANASAL SINUSES: Normal visualized paranasal sinuses. CALVARIUM AND EXTRACRANIAL SOFT TISSUES:  Left frontoparietal craniotomy with microplate screw fixation. OTHER: Healing chronic fracture deformity of bilateral nasal bones     Impression: Resolved left parafalcine subdural hematoma. No new acute intracranial abnormality. Workstation performed: [id number]     I have personally reviewed pertinent reports.    and I have personally reviewed pertinent films in PACS

## 2024-07-23 ENCOUNTER — TELEPHONE (OUTPATIENT)
Age: 79
End: 2024-07-23

## 2024-07-23 DIAGNOSIS — E11.9 TYPE 2 DIABETES MELLITUS WITHOUT COMPLICATION, WITHOUT LONG-TERM CURRENT USE OF INSULIN (HCC): Primary | ICD-10-CM

## 2024-07-23 DIAGNOSIS — N18.30 STAGE 3 CHRONIC KIDNEY DISEASE, UNSPECIFIED WHETHER STAGE 3A OR 3B CKD (HCC): ICD-10-CM

## 2024-07-23 NOTE — TELEPHONE ENCOUNTER
Pt called in stating his current cardiologist's (Dr. Pandya) office is no longer taking his insurance. He is requesting Dr. Chung take over his care with monitoring his A1C and kidneys.    Pt is scheduled to come in on 10/16 to see Dr. Chung. He is requesting labs be placed prior to his appointment. Would like labs ordered to check A1C as well as a test, to test his kidney function. If Dr. Chung does order labs for the patient, he would like them mailed to him.    Please advise, thank you

## 2024-09-05 LAB
LEFT EYE DIABETIC RETINOPATHY: NORMAL
RIGHT EYE DIABETIC RETINOPATHY: NORMAL
SEVERITY (EYE EXAM): NORMAL

## 2024-10-09 ENCOUNTER — RA CDI HCC (OUTPATIENT)
Dept: OTHER | Facility: HOSPITAL | Age: 79
End: 2024-10-09

## 2024-10-11 LAB
BUN SERPL-MCNC: 30 MG/DL (ref 8–27)
BUN/CREAT SERPL: 23 (ref 10–24)
CALCIUM SERPL-MCNC: 9.5 MG/DL (ref 8.6–10.2)
CHLORIDE SERPL-SCNC: 103 MMOL/L (ref 96–106)
CO2 SERPL-SCNC: 18 MMOL/L (ref 20–29)
CREAT SERPL-MCNC: 1.32 MG/DL (ref 0.76–1.27)
EGFR: 56 ML/MIN/1.73
GLUCOSE SERPL-MCNC: 132 MG/DL (ref 70–99)
POTASSIUM SERPL-SCNC: 5.7 MMOL/L (ref 3.5–5.2)
SODIUM SERPL-SCNC: 137 MMOL/L (ref 134–144)

## 2024-10-12 LAB — HBA1C MFR BLD: 8.1 % (ref 4.8–5.6)

## 2024-10-16 ENCOUNTER — OFFICE VISIT (OUTPATIENT)
Dept: FAMILY MEDICINE CLINIC | Facility: CLINIC | Age: 79
End: 2024-10-16
Payer: MEDICARE

## 2024-10-16 VITALS
RESPIRATION RATE: 22 BRPM | DIASTOLIC BLOOD PRESSURE: 88 MMHG | HEART RATE: 79 BPM | BODY MASS INDEX: 35.93 KG/M2 | HEIGHT: 70 IN | WEIGHT: 251 LBS | TEMPERATURE: 97 F | OXYGEN SATURATION: 97 % | SYSTOLIC BLOOD PRESSURE: 138 MMHG

## 2024-10-16 DIAGNOSIS — E66.01 OBESITY, MORBID (HCC): ICD-10-CM

## 2024-10-16 DIAGNOSIS — M25.511 CHRONIC PAIN OF BOTH SHOULDERS: ICD-10-CM

## 2024-10-16 DIAGNOSIS — M54.2 NECK PAIN: ICD-10-CM

## 2024-10-16 DIAGNOSIS — D64.9 ANEMIA, UNSPECIFIED TYPE: ICD-10-CM

## 2024-10-16 DIAGNOSIS — M25.512 CHRONIC PAIN OF BOTH SHOULDERS: ICD-10-CM

## 2024-10-16 DIAGNOSIS — E87.5 HYPERKALEMIA: ICD-10-CM

## 2024-10-16 DIAGNOSIS — H81.12 BENIGN PAROXYSMAL POSITIONAL VERTIGO OF LEFT EAR: ICD-10-CM

## 2024-10-16 DIAGNOSIS — E11.9 TYPE 2 DIABETES MELLITUS WITHOUT COMPLICATION, WITHOUT LONG-TERM CURRENT USE OF INSULIN (HCC): Primary | ICD-10-CM

## 2024-10-16 DIAGNOSIS — E11.22 TYPE 2 DIABETES MELLITUS WITH STAGE 3 CHRONIC KIDNEY DISEASE, WITHOUT LONG-TERM CURRENT USE OF INSULIN, UNSPECIFIED WHETHER STAGE 3A OR 3B CKD (HCC): ICD-10-CM

## 2024-10-16 DIAGNOSIS — N18.30 STAGE 3 CHRONIC KIDNEY DISEASE, UNSPECIFIED WHETHER STAGE 3A OR 3B CKD (HCC): ICD-10-CM

## 2024-10-16 DIAGNOSIS — G89.29 CHRONIC PAIN OF BOTH SHOULDERS: ICD-10-CM

## 2024-10-16 DIAGNOSIS — N18.30 TYPE 2 DIABETES MELLITUS WITH STAGE 3 CHRONIC KIDNEY DISEASE, WITHOUT LONG-TERM CURRENT USE OF INSULIN, UNSPECIFIED WHETHER STAGE 3A OR 3B CKD (HCC): ICD-10-CM

## 2024-10-16 PROBLEM — S06.5XAA SUBDURAL HEMATOMA (HCC): Status: RESOLVED | Noted: 2023-10-18 | Resolved: 2024-10-16

## 2024-10-16 PROBLEM — M79.641 BILATERAL HAND PAIN: Status: RESOLVED | Noted: 2023-10-18 | Resolved: 2024-10-16
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
## 2024-10-16 PROBLEM — M79.642 BILATERAL HAND PAIN: Status: RESOLVED | Noted: 2023-10-18 | Resolved: 2024-10-16

## 2024-10-16 PROBLEM — S06.9XAA TBI (TRAUMATIC BRAIN INJURY) (HCC): Status: RESOLVED | Noted: 2023-04-27 | Resolved: 2024-10-16

## 2024-10-16 PROCEDURE — 99214 OFFICE O/P EST MOD 30 MIN: CPT | Performed by: FAMILY MEDICINE

## 2024-10-16 PROCEDURE — G2211 COMPLEX E/M VISIT ADD ON: HCPCS | Performed by: FAMILY MEDICINE

## 2024-10-16 RX ORDER — PIOGLITAZONEHYDROCHLORIDE 45 MG/1
45 TABLET ORAL DAILY
Qty: 90 TABLET | Refills: 1 | Status: SHIPPED | OUTPATIENT
Start: 2024-10-16

## 2024-10-16 RX ORDER — GLIMEPIRIDE 4 MG/1
4 TABLET ORAL 2 TIMES DAILY
Qty: 180 TABLET | Refills: 1 | Status: SHIPPED | OUTPATIENT
Start: 2024-10-16

## 2024-10-16 NOTE — PROGRESS NOTES
Ambulatory Visit  Name: Dami López      : 1945      MRN: [id number]  Encounter Provider: Nickolas Chung MD  Encounter Date: 10/16/2024   Encounter department: [facility]    Assessment & Plan  Type 2 diabetes mellitus without complication, without long-term current use of insulin (HCC)    Lab Results   Component Value Date    HGBA1C 8.1 (H) 10/11/2024     COMPLIANT WITH MEDICATION  DENIES ANY NUMBNESS, TINGLING IN FEET    - DISCUSSED DIETARY MODIFICATION OF CARBOHYDRATES  - HGB A1C AND URINE STUDIES DUE TODAY  - FOOT CARE  - RV 3 MONTHS      Orders:    Microalbumin, urine, 24 hour; Future    metFORMIN (GLUCOPHAGE) 1000 MG tablet; Take 1 tablet (1,000 mg total) by mouth 2 (two) times a day    glimepiride (AMARYL) 4 mg tablet; Take 1 tablet (4 mg total) by mouth 2 (two) times a day    pioglitazone (ACTOS) 45 mg tablet; Take 1 tablet (45 mg total) by mouth daily    Stage 3 chronic kidney disease, unspecified whether stage 3a or 3b CKD ([facility])  Lab Results   Component Value Date    EGFR 56 (L) 10/11/2024    EGFR 51 10/19/2023    EGFR 48 10/18/2023    CREATININE 1.32 (H) 10/11/2024    CREATININE 1.33 (H) 10/19/2023    CREATININE 1.40 (H) 10/18/2023       STABLE           Anemia, unspecified type         Obesity, morbid (HCC)           Hyperkalemia    Orders:    Basic metabolic panel; Future    Type 2 diabetes mellitus with stage 3 chronic kidney disease, without long-term current use of insulin, unspecified whether stage 3a or 3b CKD ([facility])    Lab Results   Component Value Date    HGBA1C 8.1 (H) 10/11/2024     COMPLIANT WITH MEDICATION  DENIES ANY NUMBNESS, TINGLING IN FEET    - DISCUSSED DIETARY MODIFICATION OF CARBOHYDRATES  - HGB A1C AND URINE STUDIES DUE TODAY  - FOOT CARE  - RV 3 MONTHS           Chronic pain of both shoulders    Orders:    Ambulatory Referral to Physical Therapy; Future    Neck pain    Orders:    Ambulatory Referral to Physical Therapy; Future    Benign  paroxysmal positional vertigo of left ear    Orders:    Ambulatory Referral to Physical Therapy; Future         History of Present Illness     PATIENT RETURNS FOR ROUTINE EVALUATION OF PATIENT'S MEDICAL ISSUES    INDIVIDUAL MEDICAL ISSUES WITH THEIR CURRENT STATUS, ASSESSMENT AND PLANS ARE LISTED ABOVE            Review of Systems   Constitutional:  Negative for chills, fatigue and fever.   HENT:  Negative for congestion, ear discharge, ear pain, mouth sores, postnasal drip, sore throat and trouble swallowing.    Eyes:  Negative for pain, discharge and visual disturbance.   Respiratory:  Negative for cough, shortness of breath and wheezing.    Cardiovascular:  Negative for chest pain, palpitations and leg swelling.   Gastrointestinal:  Negative for abdominal distention, abdominal pain, blood in stool, diarrhea and nausea.   Endocrine: Negative for polydipsia, polyphagia and polyuria.   Genitourinary:  Negative for dysuria, frequency, hematuria and urgency.   Musculoskeletal:  Positive for arthralgias. Negative for gait problem and joint swelling.   Skin:  Negative for pallor and rash.   Neurological:  Positive for dizziness. Negative for syncope, speech difficulty, weakness, light-headedness, numbness and headaches.   Hematological:  Negative for adenopathy.   Psychiatric/Behavioral:  Negative for behavioral problems, confusion and sleep disturbance. The patient is not nervous/anxious.      Past Medical History:   Diagnosis Date    Diabetes mellitus (HCC)     Neoplasm of uncertain behavior of skin     Neck-Last assessed 06/13/17     Past Surgical History:   Procedure Laterality Date    BRAIN SURGERY      hematoma in brain    GASTRIC BYPASS      High    REPLACEMENT TOTAL KNEE BILATERAL       Family History   Problem Relation Age of Onset    Heart failure Mother         Congestive    Substance Abuse Neg Hx     Mental illness Neg Hx      Social History     Tobacco Use    Smoking status: Never     Passive exposure:  "Never    Smokeless tobacco: Former   Vaping Use    Vaping status: Never Used   Substance and Sexual Activity    Alcohol use: No     Comment: rarely    Drug use: No    Sexual activity: Not on file     Current Outpatient Medications on File Prior to Visit   Medication Sig    [DISCONTINUED] glimepiride (AMARYL) 4 mg tablet Take 1 tablet by mouth 2 (two) times a day    [DISCONTINUED] metFORMIN (GLUCOPHAGE) 1000 MG tablet Take 1,000 mg by mouth 2 (two) times a day    [DISCONTINUED] pioglitazone (ACTOS) 45 mg tablet Take 45 mg by mouth daily    [DISCONTINUED] bacitracin topical ointment 500 units/g topical ointment Apply 1 large application topically 2 (two) times a day for 5 days (Patient not taking: Reported on 11/6/2023)    [DISCONTINUED] levETIRAcetam (KEPPRA) 500 mg tablet Take 1 tablet (500 mg total) by mouth 2 (two) times a day for 6 days (Patient not taking: Reported on 11/6/2023)    [DISCONTINUED] meclizine (ANTIVERT) 25 mg tablet Take 1 tablet (25 mg total) by mouth every 8 (eight) hours as needed for dizziness     Allergies   Allergen Reactions    Morphine Nausea Only     Immunization History   Administered Date(s) Administered    COVID-19 PFIZER VACCINE 0.3 ML IM 03/01/2021, 03/22/2021     Objective     /88   Pulse 79   Temp (!) 97 °F (36.1 °C) (Temporal)   Resp 22   Ht 5' 10\" (1.778 m)   Wt 114 kg (251 lb)   SpO2 97%   BMI 36.01 kg/m²     Physical Exam  Vitals reviewed.   Constitutional:       General: He is not in acute distress.     Appearance: Normal appearance. He is well-developed. He is not ill-appearing.   HENT:      Head: Normocephalic and atraumatic.      Nose: Nose normal. No congestion.   Eyes:      General:         Right eye: No discharge.         Left eye: No discharge.      Conjunctiva/sclera: Conjunctivae normal.      Pupils: Pupils are equal, round, and reactive to light.   Neck:      Thyroid: No thyromegaly.      Vascular: No JVD.   Cardiovascular:      Rate and Rhythm: Normal " rate and regular rhythm.      Pulses: no weak pulses.           Dorsalis pedis pulses are 1+ on the right side and 1+ on the left side.        Posterior tibial pulses are 1+ on the right side and 1+ on the left side.      Heart sounds: Normal heart sounds. No murmur heard.  Pulmonary:      Effort: Pulmonary effort is normal.      Breath sounds: Normal breath sounds. No wheezing or rales.   Abdominal:      General: Bowel sounds are normal.      Palpations: Abdomen is soft. There is no mass.      Tenderness: There is no abdominal tenderness. There is no guarding or rebound.   Musculoskeletal:         General: No tenderness or deformity.      Cervical back: Neck supple.      Comments: MODERATE DJD CHANGES     Feet:      Right foot:      Skin integrity: Dry skin present. No ulcer, skin breakdown, erythema, warmth or callus.      Left foot:      Skin integrity: Dry skin present. No ulcer, skin breakdown, erythema, warmth or callus.   Lymphadenopathy:      Cervical: No cervical adenopathy.   Skin:     General: Skin is warm and dry.      Findings: No erythema or rash.   Neurological:      General: No focal deficit present.      Mental Status: He is alert and oriented to person, place, and time.   Psychiatric:         Mood and Affect: Mood normal.         Behavior: Behavior normal.         Thought Content: Thought content normal.         Judgment: Judgment normal.     Diabetic Foot Exam    Patient's shoes and socks removed.    Right Foot/Ankle   Right Foot Inspection  Skin Exam: skin normal, skin intact and dry skin. No warmth, no callus, no erythema, no maceration, no abnormal color, no pre-ulcer, no ulcer and no callus.     Toe Exam: ROM and strength within normal limits.     Sensory   Monofilament testing: diminished    Vascular  The right DP pulse is 1+. The right PT pulse is 1+.     Left Foot/Ankle  Left Foot Inspection  Skin Exam: skin normal, skin intact and dry skin. No warmth, no erythema, no maceration, normal  color, no pre-ulcer, no ulcer and no callus.     Toe Exam: ROM and strength within normal limits.     Sensory   Monofilament testing: diminished    Vascular  The left DP pulse is 1+. The left PT pulse is 1+.     Assign Risk Category  No deformity present  Loss of protective sensation  No weak pulses  Risk: 1

## 2024-10-16 NOTE — PATIENT INSTRUCTIONS
CONTINUE CURRENT TREATMENT PLAN  MONITOR DIETARY SODIUM, CHOL, AND CARBOHYDRATE INTAKE  ENCOURAGE PHYSICAL ACTIVITY  FOOT CARE    RV 3 M, SOONER PRN      Recent Results (from the past 672 hour(s))   Basic metabolic panel    Collection Time: 10/11/24  8:14 AM   Result Value Ref Range    Glucose, Random 132 (H) 70 - 99 mg/dL    BUN 30 (H) 8 - 27 mg/dL    Creatinine 1.32 (H) 0.76 - 1.27 mg/dL    eGFR 56 (L) >59 mL/min/1.73    SL AMB BUN/CREATININE RATIO 23 10 - 24    Sodium 137 134 - 144 mmol/L    Potassium 5.7 (H) 3.5 - 5.2 mmol/L    Chloride 103 96 - 106 mmol/L    CO2 18 (L) 20 - 29 mmol/L    CALCIUM 9.5 8.6 - 10.2 mg/dL   Hemoglobin A1c (w/out EAG)    Collection Time: 10/11/24  8:14 AM   Result Value Ref Range    Hemoglobin A1C 8.1 (H) 4.8 - 5.6 %

## 2024-10-16 NOTE — ASSESSMENT & PLAN NOTE
Lab Results   Component Value Date    HGBA1C 8.1 (H) 10/11/2024     COMPLIANT WITH MEDICATION  DENIES ANY NUMBNESS, TINGLING IN FEET    - DISCUSSED DIETARY MODIFICATION OF CARBOHYDRATES  - HGB A1C AND URINE STUDIES DUE TODAY  - FOOT CARE  - RV 3 MONTHS      Orders:    Microalbumin, urine, 24 hour; Future    metFORMIN (GLUCOPHAGE) 1000 MG tablet; Take 1 tablet (1,000 mg total) by mouth 2 (two) times a day    glimepiride (AMARYL) 4 mg tablet; Take 1 tablet (4 mg total) by mouth 2 (two) times a day    pioglitazone (ACTOS) 45 mg tablet; Take 1 tablet (45 mg total) by mouth daily

## 2024-10-16 NOTE — LETTER
PRICILA      Current Outpatient Medications:     glimepiride (AMARYL) 4 mg tablet, Take 1 tablet by mouth 2 (two) times a day, Disp: , Rfl:     metFORMIN (GLUCOPHAGE) 1000 MG tablet, Take 1,000 mg by mouth 2 (two) times a day, Disp: , Rfl:     pioglitazone (ACTOS) 45 mg tablet, Take 45 mg by mouth daily, Disp: , Rfl:       Recent Results (from the past 672 hour(s))   Basic metabolic panel    Collection Time: 10/11/24  8:14 AM   Result Value Ref Range    Glucose, Random 132 (H) 70 - 99 mg/dL    BUN 30 (H) 8 - 27 mg/dL    Creatinine 1.32 (H) 0.76 - 1.27 mg/dL    eGFR 56 (L) >59 mL/min/1.73    SL AMB BUN/CREATININE RATIO 23 10 - 24    Sodium 137 134 - 144 mmol/L    Potassium 5.7 (H) 3.5 - 5.2 mmol/L    Chloride 103 96 - 106 mmol/L    CO2 18 (L) 20 - 29 mmol/L    CALCIUM 9.5 8.6 - 10.2 mg/dL   Hemoglobin A1c (w/out EAG)    Collection Time: 10/11/24  8:14 AM   Result Value Ref Range    Hemoglobin A1C 8.1 (H) 4.8 - 5.6 %

## 2024-10-16 NOTE — ASSESSMENT & PLAN NOTE
Lab Results   Component Value Date    EGFR 56 (L) 10/11/2024    EGFR 51 10/19/2023    EGFR 48 10/18/2023    CREATININE 1.32 (H) 10/11/2024    CREATININE 1.33 (H) 10/19/2023    CREATININE 1.40 (H) 10/18/2023       STABLE

## 2024-10-16 NOTE — ASSESSMENT & PLAN NOTE
Lab Results   Component Value Date    HGBA1C 8.1 (H) 10/11/2024     COMPLIANT WITH MEDICATION  DENIES ANY NUMBNESS, TINGLING IN FEET    - DISCUSSED DIETARY MODIFICATION OF CARBOHYDRATES  - HGB A1C AND URINE STUDIES DUE TODAY  - FOOT CARE  - RV 3 MONTHS

## 2024-10-17 ENCOUNTER — TELEPHONE (OUTPATIENT)
Dept: ADMINISTRATIVE | Facility: OTHER | Age: 79
End: 2024-10-17

## 2024-10-17 NOTE — LETTER
Diabetic Eye Exam Form    Date Requested: 10/18/24  Patient: Dami López  Patient : 1945   Referring Provider: Nickolas Chung MD      DIABETIC Eye Exam Date _______________________________      Type of Exam MUST be documented for Diabetic Eye Exams. Please CHECK ONE.     Retinal Exam       Dilated Retinal Exam       OCT       Optomap-Iris Exam      Fundus Photography       Left Eye - Please check Retinopathy or No Retinopathy        Exam did show retinopathy    Exam did not show retinopathy       Right Eye - Please check Retinopathy or No Retinopathy       Exam did show retinopathy    Exam did not show retinopathy       Comments __________________________________________________________    Practice Providing Exam ______________________________________________    Exam Performed By (print name) _______________________________________      Provider Signature ___________________________________________________      These reports are needed for  compliance.  Please fax this completed form and a copy of the Diabetic Eye Exam report to our office located at [address] [place] [address] as soon as possible via Fax [phone] attention Dina: Phone [phone]  We thank you for your assistance in treating our mutual patient.

## 2024-10-17 NOTE — TELEPHONE ENCOUNTER
----- Message from Javon CHAVEZ sent at 10/16/2024  1:26 PM EDT -----  Regarding: CARE GAP REQUST - DM EYE  10/16/24 1:26 PM    Hello, our patient attached above has had Diabetic Eye Exam completed/performed. Please assist in updating the patient chart by making an External outreach to Dr. [facility] facility located in [place], [place]. The date of service is 2024.    Thank you,  Javon Mckeon, [facility]

## 2024-10-18 NOTE — TELEPHONE ENCOUNTER
Upon review of the In Basket request and the patient's chart, initial outreach has been made via fax to facility. Please see Contacts section for details.     Thank you  Dina Berman MA

## 2024-10-21 NOTE — TELEPHONE ENCOUNTER
Upon review of the In Basket request we were able to locate, review, and update the patient chart as requested for Diabetic Eye Exam.    Any additional questions or concerns should be emailed to the Practice Liaisons via the appropriate education email address, please do not reply via In Basket.    Thank you  Dina Berman MA   [facility]

## 2024-10-23 ENCOUNTER — TELEPHONE (OUTPATIENT)
Age: 79
End: 2024-10-23

## 2024-10-23 NOTE — TELEPHONE ENCOUNTER
Patient called to see if Dr. Chung put in order for physical therapy.  The order was there, patient has not received call from physical therapy yet.

## 2024-10-28 ENCOUNTER — EVALUATION (OUTPATIENT)
Dept: PHYSICAL THERAPY | Facility: CLINIC | Age: 79
End: 2024-10-28
Payer: MEDICARE

## 2024-10-28 DIAGNOSIS — M54.2 NECK PAIN: ICD-10-CM

## 2024-10-28 DIAGNOSIS — M25.512 CHRONIC PAIN OF BOTH SHOULDERS: ICD-10-CM

## 2024-10-28 DIAGNOSIS — H81.12 BENIGN PAROXYSMAL POSITIONAL VERTIGO OF LEFT EAR: ICD-10-CM

## 2024-10-28 DIAGNOSIS — M25.511 CHRONIC PAIN OF BOTH SHOULDERS: ICD-10-CM

## 2024-10-28 DIAGNOSIS — G89.29 CHRONIC PAIN OF BOTH SHOULDERS: ICD-10-CM

## 2024-10-28 PROCEDURE — 97162 PT EVAL MOD COMPLEX 30 MIN: CPT | Performed by: PHYSICAL THERAPIST

## 2024-10-28 NOTE — PROGRESS NOTES
PT Evaluation     Today's date: 10/28/2024  Patient name: Dami López  : 1945  MRN: [id number]  Referring provider: Nickolas Chung MD  Dx:   Encounter Diagnosis     ICD-10-CM    1. Chronic pain of both shoulders  M25.511 Ambulatory Referral to Physical Therapy    G89.29     M25.512       2. Neck pain  M54.2 Ambulatory Referral to Physical Therapy      3. Benign paroxysmal positional vertigo of left ear  H81.12 Ambulatory Referral to Physical Therapy                     Assessment  Impairments: abnormal or restricted ROM, activity intolerance, impaired balance, impaired physical strength, lacks appropriate home exercise program, pain with function, poor posture , poor body mechanics, activity limitations and endurance    Assessment details: Dami López is a 79 y.o. male who presents with pain, decreased strength, decreased ROM, impaired sensation, postural dysfunction, and balance dysfunction. Due to these impairments, patient has difficulty performing ADL's, recreational activities, work-related activities, engaging in social activities, lifting/carrying, reaching. Patient's clinical presentation is consistent with their referring diagnosis of Chronic pain of both shoulders, Neck pain, Benign paroxysmal positional vertigo of left ear. Patient has been educated in home exercise program and plan of care. Patient would benefit from skilled physical therapy services to address their aforementioned functional limitations and progress towards prior level of function and independence with home exercise program.     Barriers to intervention: behavior and medical complexity  Understanding of Dx/Px/POC: good     Prognosis: good    Goals  Short Term Goals to be accomplished in 3 weeks:  STG1: Pt will be I with HEP  STG2: Pt will be I with posture management  STG3: Pt will demo Cervical AROM >50% improvement  STG4: Pt will deny sleep disturbance due to pain     Long Term Goals to be accomplished in 6  weeks:   LTG1: Pt will demo cervical stab/shoulder strength to WNL as per PLOF  LTG2: Pt will return to driving, turning head, and gardening as per PLOF pain free  LTG3: Pt will demo cervical AROM WNL      Plan  Patient would benefit from: PT eval and skilled physical therapy  Planned modality interventions: cryotherapy and thermotherapy: hydrocollator packs    Planned therapy interventions: manual therapy, neuromuscular re-education, self care, therapeutic activities, therapeutic exercise and home exercise program    Frequency: 2x week  Duration in weeks: 6  Plan of Care beginning date: 10/28/2024  Plan of Care expiration date: 2024  Treatment plan discussed with: patient  Plan details:  HEP development, stretching, strengthening, A/AA/PROM, joint mobilizations, posture education, STM/MI as needed to reduce muscle tension, muscle reeducation, patient has been educated in Dx, prognosis and plan of care and is in agreement.            Subjective Evaluation    History of Present Illness  Mechanism of injury: Pt reports that he has been dealing with neck pain for about three to four months. States that he is having a hard time moving his head. States that he is having difficulty cutting grass, turning his head to the left when driving, difficulty putting on a shirt/sweater, and pain is present when he lays on his left side. States that he has some numbness in his left hand and also reports weakness. Pt does have a history if diabetes and chronic bilateral shoulder pain.   Patient Goals  Patient goals for therapy: increased strength, independence with ADLs/IADLs, return to sport/leisure activities, return to work, increased motion, improved balance and decreased pain    Pain  Current pain rating: 3  At best pain rating: 3  At worst pain ratin  Quality: discomfort, sharp, pressure and tight  Relieving factors: medications, relaxation, rest and heat    Social Support  Steps to enter house: yes  Stairs in house:  yes   Lives in: multiple-level home  Lives with: spouse    Employment status: not working  Hand dominance: right    Treatments  Previous treatment: physical therapy  Current treatment: physical therapy      Objective     Postural Observations  Seated posture: fair  Standing posture: poor      Active Range of Motion   Cervical/Thoracic Spine       Cervical    Flexion:  WFL  Extension: 10 degrees     with pain  Left lateral flexion: 15 degrees     with pain  Right lateral flexion: 30 degrees     with pain    Scapular Mobility   Left Shoulder   Scapular mobility: fair  Scapular Mobility with Shoulder to 90° FF   Scapular winging: moderate    Right Shoulder   Scapular mobility: fair  Scapular Mobility with Shoulder to 90° FF   Scapular winging: moderate    Joint Play     Hypomobile: T1, T2, T3, T4, T5, T6, T7 and T8     Strength/Myotome Testing     Left Shoulder     Planes of Motion   Flexion: 3   Extension: 3+   Abduction: 3+   External rotation at 0°: 3+     Right Shoulder     Planes of Motion   Flexion: 3+   Extension: 3+   Abduction: 3+   External rotation at 0°: 3+     Tests   Cervical   Positive vertical compression, cervical distraction test and neck flexor muscle endurance test.  Negative alar ligament test and VBI.     Left Shoulder   Positive empty can and Neer's.   Negative ULTT1.     Right Shoulder   Positive empty can and Neer's.   Negative ULTT1.     Lumbar   Positive vertical compression .            Precautions: standard, chronic shoulder pain, vertigo history, neck pain.   SOC: 10/28/24  POC: 12/09/24  HEP: next session due to time restraint     Date  10/28/24            Visits  1 IE             Auth: no auth req                           Manuals             Cervical distraction                                                     Neuro Re-Ed             Chin tucks                                                                                            Ther Ex                                                                                                                      Ther Activity                                       Gait Training                                       Modalities

## 2024-10-30 ENCOUNTER — OFFICE VISIT (OUTPATIENT)
Dept: PHYSICAL THERAPY | Facility: CLINIC | Age: 79
End: 2024-10-30
Payer: MEDICARE

## 2024-10-30 DIAGNOSIS — M54.2 NECK PAIN: ICD-10-CM

## 2024-10-30 DIAGNOSIS — M25.512 CHRONIC PAIN OF BOTH SHOULDERS: Primary | ICD-10-CM

## 2024-10-30 DIAGNOSIS — G89.29 CHRONIC PAIN OF BOTH SHOULDERS: Primary | ICD-10-CM

## 2024-10-30 DIAGNOSIS — H81.12 BENIGN PAROXYSMAL POSITIONAL VERTIGO OF LEFT EAR: ICD-10-CM

## 2024-10-30 DIAGNOSIS — M25.511 CHRONIC PAIN OF BOTH SHOULDERS: Primary | ICD-10-CM

## 2024-10-30 PROCEDURE — 97110 THERAPEUTIC EXERCISES: CPT | Performed by: PHYSICAL THERAPIST

## 2024-10-30 PROCEDURE — 97140 MANUAL THERAPY 1/> REGIONS: CPT | Performed by: PHYSICAL THERAPIST

## 2024-10-30 PROCEDURE — 97112 NEUROMUSCULAR REEDUCATION: CPT | Performed by: PHYSICAL THERAPIST

## 2024-10-30 NOTE — PROGRESS NOTES
Daily Note     Today's date: 10/30/2024  Patient name: Dami López  : 1945  MRN: [id number]  Referring provider: Nickolas Chung MD  Dx:   Encounter Diagnosis     ICD-10-CM    1. Chronic pain of both shoulders  M25.511     G89.29     M25.512       2. Neck pain  M54.2       3. Benign paroxysmal positional vertigo of left ear  H81.12                      Subjective: Pt reports that he has been doing gentle ROM since last session and has noted some improvements in ROM. Complains of stiffness and tightness.       Objective: See treatment diary below      Assessment: Tolerated treatment well. Patient demonstrated fatigue post treatment, exhibited good technique with therapeutic exercises, and would benefit from continued PT      Plan: Continue per plan of care.      Precautions: standard, chronic shoulder pain, vertigo history, neck pain.   SOC: 10/28/24  POC: 24  HEP: next session due to time restraint     Date  10/28/24 10/30           Visits  1 IE  2           Auth: no auth req                           Manuals             Cervical distraction   IM              IASTM UT x IM             PROM shoulder in all directions left side > right                         Neuro Re-Ed             Chin tucks   Cues req x 15              Scap ret cues req x 15              Wall slides x 15              GTB x 20 rows cues req              GTB ext x 20              Pulleys flexion and ret x 20                         Ther Ex                                                                                                                     Ther Activity                                       Gait Training                                       Modalities

## 2024-11-05 ENCOUNTER — OFFICE VISIT (OUTPATIENT)
Dept: PHYSICAL THERAPY | Facility: CLINIC | Age: 79
End: 2024-11-05
Payer: MEDICARE

## 2024-11-05 DIAGNOSIS — H81.12 BENIGN PAROXYSMAL POSITIONAL VERTIGO OF LEFT EAR: ICD-10-CM

## 2024-11-05 DIAGNOSIS — M54.2 NECK PAIN: ICD-10-CM

## 2024-11-05 DIAGNOSIS — M25.511 CHRONIC PAIN OF BOTH SHOULDERS: Primary | ICD-10-CM

## 2024-11-05 DIAGNOSIS — G89.29 CHRONIC PAIN OF BOTH SHOULDERS: Primary | ICD-10-CM

## 2024-11-05 DIAGNOSIS — M25.512 CHRONIC PAIN OF BOTH SHOULDERS: Primary | ICD-10-CM

## 2024-11-05 PROCEDURE — 97140 MANUAL THERAPY 1/> REGIONS: CPT | Performed by: PHYSICAL THERAPIST

## 2024-11-05 PROCEDURE — 97112 NEUROMUSCULAR REEDUCATION: CPT | Performed by: PHYSICAL THERAPIST

## 2024-11-05 PROCEDURE — 97110 THERAPEUTIC EXERCISES: CPT | Performed by: PHYSICAL THERAPIST

## 2024-11-05 NOTE — PROGRESS NOTES
Daily Note     Today's date: 2024  Patient name: Dami López  : 1945  MRN: [id number]  Referring provider: Nickloas Chung MD  Dx:   Encounter Diagnosis     ICD-10-CM    1. Chronic pain of both shoulders  M25.511     G89.29     M25.512       2. Neck pain  M54.2       3. Benign paroxysmal positional vertigo of left ear  H81.12                      Subjective: Pt reports that he is noticing improvements in symptoms. States that he has noted improvements in neck ROM and decrease pain with functional activities.     Objective: See treatment diary below      Assessment: Tolerated treatment well. Patient demonstrated fatigue post treatment, exhibited good technique with therapeutic exercises, and would benefit from continued PT      Plan: Continue per plan of care.      Insurance Eval/ Re-eval POC expires Auth Status Total visits  Start date  Expiration date Misc   Medicare - IE 24 Not required  10/28 12/09/24                  Date  10/28/24 10/30 11/04/24          Visits  1 IE  2 3          Auth: no auth req                           Manuals             Cervical distraction   IM  IM             IASTM UT x IM PA glides thoracic glides - Im             PROM shoulder in all directions left side > right                         Neuro Re-Ed             Chin tucks   Cues req x 15  Thoracic ext seated x 10 5s hold             Scap ret cues req x 15  Scap ret w/ rtb x 15 5s hold             Wall slides x 15  Wall slides x 20             GTB x 20 rows cues req  CC 12.5# x 20 rows             GTB ext x 20  GTB ext x 20             Pulleys flexion and ret x 20  Pulleys flexion and ret x 20              Cane flesion supine  x 20           Ther Ex                UT and LEV stretch x 10                                                                                                      Ther Activity                                       Gait Training                                       Modalities                                          Precautions: standard, chronic shoulder pain, vertigo history, neck pain.   SOC: 10/28/24  POC: 12/09/24  HEP: Access Code: [email or web address]  URL: [email or web address]  Date: 11/05/2024  Prepared by: Karina Jasso    Exercises  - Seated Scapular Retraction  - 1 x daily - 7 x weekly - 3 sets - 10 reps  - Seated Thoracic Lumbar Extension  - 1 x daily - 7 x weekly - 3 sets - 10 reps  - Seated Cervical Retraction and Extension  - 1 x daily - 7 x weekly - 3 sets - 10 reps  - Seated Cervical Retraction and Rotation  - 1 x daily - 7 x weekly - 3 sets - 10 reps

## 2024-11-07 ENCOUNTER — APPOINTMENT (OUTPATIENT)
Dept: PHYSICAL THERAPY | Facility: CLINIC | Age: 79
End: 2024-11-07
Payer: MEDICARE

## 2024-11-12 ENCOUNTER — OFFICE VISIT (OUTPATIENT)
Dept: PHYSICAL THERAPY | Facility: CLINIC | Age: 79
End: 2024-11-12
Payer: MEDICARE

## 2024-11-12 DIAGNOSIS — M25.512 CHRONIC PAIN OF BOTH SHOULDERS: Primary | ICD-10-CM

## 2024-11-12 DIAGNOSIS — M54.2 NECK PAIN: ICD-10-CM

## 2024-11-12 DIAGNOSIS — H81.12 BENIGN PAROXYSMAL POSITIONAL VERTIGO OF LEFT EAR: ICD-10-CM

## 2024-11-12 DIAGNOSIS — M25.511 CHRONIC PAIN OF BOTH SHOULDERS: Primary | ICD-10-CM

## 2024-11-12 DIAGNOSIS — G89.29 CHRONIC PAIN OF BOTH SHOULDERS: Primary | ICD-10-CM

## 2024-11-12 PROCEDURE — 97140 MANUAL THERAPY 1/> REGIONS: CPT | Performed by: PHYSICAL THERAPIST

## 2024-11-12 PROCEDURE — 97112 NEUROMUSCULAR REEDUCATION: CPT | Performed by: PHYSICAL THERAPIST

## 2024-11-12 PROCEDURE — 97110 THERAPEUTIC EXERCISES: CPT | Performed by: PHYSICAL THERAPIST

## 2024-11-12 NOTE — PROGRESS NOTES
Daily Note     Today's date: 2024  Patient name: Dami López  : 1945  MRN: [id number]  Referring provider: Nickolas Chung MD  Dx:   Encounter Diagnosis     ICD-10-CM    1. Chronic pain of both shoulders  M25.511     G89.29     M25.512       2. Neck pain  M54.2       3. Benign paroxysmal positional vertigo of left ear  H81.12                      Subjective: Pt reports that his neck pain is feeling well and denies any pain in the neck/shoulder.        Objective: See treatment diary below      Assessment: Tolerated treatment well. Patient demonstrated fatigue post treatment, exhibited good technique with therapeutic exercises, and would benefit from continued PT      Plan: Continue per plan of care.      Insurance Eval/ Re-eval POC expires Auth Status Total visits  Start date  Expiration date Misc   Medicare - IE 24 Not required  10/28 12/09/24                  Date  10/28/24 10/30 11/04/24 11/12/24         Visits  1 IE  2 3 4         Auth: no auth req                           Manuals             Cervical distraction   IM  IM             IASTM UT x IM PA glides thoracic glides - Im  IM            PROM shoulder in all directions left side > right   IM                       Neuro Re-Ed             Chin tucks   Cues req x 15  Thoracic ext seated x 10 5s hold             Scap ret cues req x 15  Scap ret w/ rtb x 15 5s hold  Scap ret w/ RTB x 15 5s hold            Wall slides x 15  Wall slides x 20  Wall slides x 20            GTB x 20 rows cues req  CC 12.5# x 20 rows  Cc 12.5# x 20 rows            GTB ext x 20  GTB ext x 20  GTB ext x 20            Pulleys flexion and ret x 20  Pulleys flexion and ret x 20  Pulleys flexion and ret x 20             Cane flesion supine  x 20  Cane flexion x 20          Ther Ex                UT and LEV stretch x 10  UT stretch x 10              Door way stretch x 10                                                                                         Ther Activity                                       Gait Training                                       Modalities                                         Precautions: standard, chronic shoulder pain, vertigo history, neck pain.   SOC: 10/28/24  POC: 12/09/24  HEP: Access Code: [email or web address]  URL: [email or web address]  Date: 11/05/2024  Prepared by: Karina Jasso    Exercises  - Seated Scapular Retraction  - 1 x daily - 7 x weekly - 3 sets - 10 reps  - Seated Thoracic Lumbar Extension  - 1 x daily - 7 x weekly - 3 sets - 10 reps  - Seated Cervical Retraction and Extension  - 1 x daily - 7 x weekly - 3 sets - 10 reps  - Seated Cervical Retraction and Rotation  - 1 x daily - 7 x weekly - 3 sets - 10 reps

## 2024-11-14 ENCOUNTER — OFFICE VISIT (OUTPATIENT)
Dept: PHYSICAL THERAPY | Facility: CLINIC | Age: 79
End: 2024-11-14
Payer: MEDICARE

## 2024-11-14 DIAGNOSIS — M25.512 CHRONIC PAIN OF BOTH SHOULDERS: Primary | ICD-10-CM

## 2024-11-14 DIAGNOSIS — H81.12 BENIGN PAROXYSMAL POSITIONAL VERTIGO OF LEFT EAR: ICD-10-CM

## 2024-11-14 DIAGNOSIS — M25.511 CHRONIC PAIN OF BOTH SHOULDERS: Primary | ICD-10-CM

## 2024-11-14 DIAGNOSIS — M54.2 NECK PAIN: ICD-10-CM

## 2024-11-14 DIAGNOSIS — G89.29 CHRONIC PAIN OF BOTH SHOULDERS: Primary | ICD-10-CM

## 2024-11-14 PROCEDURE — 97140 MANUAL THERAPY 1/> REGIONS: CPT | Performed by: PHYSICAL THERAPIST

## 2024-11-14 PROCEDURE — 97110 THERAPEUTIC EXERCISES: CPT | Performed by: PHYSICAL THERAPIST

## 2024-11-14 NOTE — PROGRESS NOTES
Daily Note     Today's date: 2024  Patient name: Dami López  : 1945  MRN: [id number]  Referring provider: Nickolas Chung MD  Dx:   Encounter Diagnosis     ICD-10-CM    1. Chronic pain of both shoulders  M25.511     G89.29     M25.512       2. Neck pain  M54.2       3. Benign paroxysmal positional vertigo of left ear  H81.12                      Subjective: Pt reports that he is feeling a little stiff today. States that pain is 4/10 in the neck. States that last night he had a rough night due to pain.        Objective: See treatment diary below      Assessment: Tolerated treatment well. Patient demonstrated fatigue post treatment, exhibited good technique with therapeutic exercises, and would benefit from continued PT      Plan: Continue per plan of care.        Insurance Eval/ Re-eval POC expires Auth Status Total visits  Start date  Expiration date Misc   Medicare - IE 24 Not required  10/28 12/09/24                  Date  10/28/24 10/30 11/04/24 11/12/24 11/14/24        Visits  1 IE  2 3 4 5        Auth: no auth req                           Manuals             Cervical distraction   IM  IM   IM manual cervical distraction           IASTM UT x IM PA glides thoracic glides - Im  IM  IASTM UT B - IM           PROM shoulder in all directions left side > right   IM                       Neuro Re-Ed             Chin tucks   Cues req x 15  Thoracic ext seated x 10 5s hold   Snag x 15           Scap ret cues req x 15  Scap ret w/ rtb x 15 5s hold  Scap ret w/ RTB x 15 5s hold  Scap ret w/ RTB x 15 5s hold           Wall slides x 15  Wall slides x 20  Wall slides x 20  Wall slides x 20           GTB x 20 rows cues req  CC 12.5# x 20 rows  Cc 12.5# x 20 rows  Cc 12.5# x 20           GTB ext x 20  GTB ext x 20  GTB ext x 20            Pulleys flexion and ret x 20  Pulleys flexion and ret x 20  Pulleys flexion and ret x 20             Cane flesion supine  x 20  Cane flexion x 20           Ther Ex                UT and LEV stretch x 10  UT stretch x 10              Door way stretch x 10  Door way stretch x 15              Open books x 5 10s standing hold                                                                          Ther Activity                                       Gait Training                                       Modalities                                         Precautions: standard, chronic shoulder pain, vertigo history, neck pain.   SOC: 10/28/24  POC: 12/09/24  HEP: Access Code: [email or web address]  URL: [email or web address]  Date: 11/05/2024  Prepared by: Karina Jasso    Exercises  - Seated Scapular Retraction  - 1 x daily - 7 x weekly - 3 sets - 10 reps  - Seated Thoracic Lumbar Extension  - 1 x daily - 7 x weekly - 3 sets - 10 reps  - Seated Cervical Retraction and Extension  - 1 x daily - 7 x weekly - 3 sets - 10 reps  - Seated Cervical Retraction and Rotation  - 1 x daily - 7 x weekly - 3 sets - 10 reps

## 2024-11-19 ENCOUNTER — OFFICE VISIT (OUTPATIENT)
Dept: PHYSICAL THERAPY | Facility: CLINIC | Age: 79
End: 2024-11-19
Payer: MEDICARE

## 2024-11-19 DIAGNOSIS — H81.12 BENIGN PAROXYSMAL POSITIONAL VERTIGO OF LEFT EAR: ICD-10-CM

## 2024-11-19 DIAGNOSIS — M54.2 NECK PAIN: ICD-10-CM

## 2024-11-19 DIAGNOSIS — G89.29 CHRONIC PAIN OF BOTH SHOULDERS: Primary | ICD-10-CM

## 2024-11-19 DIAGNOSIS — M25.512 CHRONIC PAIN OF BOTH SHOULDERS: Primary | ICD-10-CM

## 2024-11-19 DIAGNOSIS — M25.511 CHRONIC PAIN OF BOTH SHOULDERS: Primary | ICD-10-CM

## 2024-11-19 PROCEDURE — 97110 THERAPEUTIC EXERCISES: CPT | Performed by: PHYSICAL THERAPIST

## 2024-11-19 PROCEDURE — 97112 NEUROMUSCULAR REEDUCATION: CPT | Performed by: PHYSICAL THERAPIST

## 2024-11-19 PROCEDURE — 97140 MANUAL THERAPY 1/> REGIONS: CPT | Performed by: PHYSICAL THERAPIST

## 2024-11-19 NOTE — PROGRESS NOTES
Daily Note     Today's date: 2024  Patient name: Dami López  : 1945  MRN: [id number]  Referring provider: Nickolas Chung MD  Dx:   Encounter Diagnosis     ICD-10-CM    1. Chronic pain of both shoulders  M25.511     G89.29     M25.512       2. Neck pain  M54.2       3. Benign paroxysmal positional vertigo of left ear  H81.12                      Subjective: States that he has noted an improvement in his neck symptoms. States that his shoulder continues to be painful and limited in his ROM.       Objective: See treatment diary below      Assessment: Tolerated treatment well. Patient demonstrated fatigue post treatment, exhibited good technique with therapeutic exercises, and would benefit from continued PT      Plan: Continue per plan of care.        Insurance Eval/ Re-eval POC expires Auth Status Total visits  Start date  Expiration date Misc   Medicare - IE 24 Not required  10/28 12/09/24                  Date  10/28/24 10/30 11/04/24 11/12/24 11/14/24 11/19/24       Visits  1 IE  2 3 4 5 6       Auth: no auth req                           Manuals             Cervical distraction   IM  IM   IM manual cervical distraction  IM          IASTM UT x IM PA glides thoracic glides - Im  IM  IASTM UT B - IM           PROM shoulder in all directions left side > right   IM   PROM right shoulder only all directions    Hard end feel with flexion, abd, and ER                     Neuro Re-Ed             Chin tucks   Cues req x 15  Thoracic ext seated x 10 5s hold   Snag x 15           Scap ret cues req x 15  Scap ret w/ rtb x 15 5s hold  Scap ret w/ RTB x 15 5s hold  Scap ret w/ RTB x 15 5s hold  Scap ret + RTB x 20 5s hold          Wall slides x 15  Wall slides x 20  Wall slides x 20  Wall slides x 20  Wall circles w/ ball x 15 B          GTB x 20 rows cues req  CC 12.5# x 20 rows  Cc 12.5# x 20 rows  Cc 12.5# x 20           GTB ext x 20  GTB ext x 20  GTB ext x 20   Supine flexion  w/ cane x  15     Cane ER stretch x 10          Pulleys flexion and ret x 20  Pulleys flexion and ret x 20  Pulleys flexion and ret x 20   Pulleys flexion x 20           Cane flesion supine  x 20  Cane flexion x 20          Ther Ex                UT and LEV stretch x 10  UT stretch x 10   UT stretch x 10            Door way stretch x 10  Door way stretch x 15  Serratus punches x 15             Open books x 5 10s standing hold                                                                          Ther Activity                                       Gait Training                                       Modalities                                         Precautions: standard, chronic shoulder pain, vertigo history, neck pain.   SOC: 10/28/24  POC: 12/09/24  HEP: Access Code: [email or web address]  URL: [email or web address]  Date: 11/05/2024  Prepared by: Karina Jasso    Exercises  - Seated Scapular Retraction  - 1 x daily - 7 x weekly - 3 sets - 10 reps  - Seated Thoracic Lumbar Extension  - 1 x daily - 7 x weekly - 3 sets - 10 reps  - Seated Cervical Retraction and Extension  - 1 x daily - 7 x weekly - 3 sets - 10 reps  - Seated Cervical Retraction and Rotation  - 1 x daily - 7 x weekly - 3 sets - 10 reps

## 2024-11-21 ENCOUNTER — OFFICE VISIT (OUTPATIENT)
Dept: PHYSICAL THERAPY | Facility: CLINIC | Age: 79
End: 2024-11-21
Payer: MEDICARE

## 2024-11-21 DIAGNOSIS — M54.2 NECK PAIN: ICD-10-CM

## 2024-11-21 DIAGNOSIS — G89.29 CHRONIC PAIN OF BOTH SHOULDERS: Primary | ICD-10-CM

## 2024-11-21 DIAGNOSIS — M25.512 CHRONIC PAIN OF BOTH SHOULDERS: Primary | ICD-10-CM

## 2024-11-21 DIAGNOSIS — M25.511 CHRONIC PAIN OF BOTH SHOULDERS: Primary | ICD-10-CM

## 2024-11-21 PROCEDURE — 97140 MANUAL THERAPY 1/> REGIONS: CPT | Performed by: PHYSICAL THERAPIST

## 2024-11-21 PROCEDURE — 97110 THERAPEUTIC EXERCISES: CPT | Performed by: PHYSICAL THERAPIST

## 2024-11-21 PROCEDURE — 97112 NEUROMUSCULAR REEDUCATION: CPT | Performed by: PHYSICAL THERAPIST

## 2024-11-21 NOTE — PROGRESS NOTES
Daily Note     Today's date: 2024  Patient name: Dami López  : 1945  MRN: [id number]  Referring provider: Nickolas Chung MD  Dx:   Encounter Diagnosis     ICD-10-CM    1. Chronic pain of both shoulders  M25.511     G89.29     M25.512       2. Neck pain  M54.2                      Subjective: Pt reports that he feels better then he did last session. Pain level is mild in the shoulders.       Objective: See treatment diary below      Assessment: Tolerated treatment well. Patient demonstrated fatigue post treatment, exhibited good technique with therapeutic exercises, and would benefit from continued PT      Plan: Continue per plan of care.        Insurance Eval/ Re-eval POC expires Auth Status Total visits  Start date  Expiration date Misc   Medicare - IE 24 Not required  10/28 12/09/24                  Date  10/28/24 10/30 11/04/24 11/12/24 11/14/24 11/19/24 11/21/24      Visits  1 IE  2 3 4 5 6 7      Auth: no auth req                           Manuals             Cervical distraction   IM  IM   IM manual cervical distraction  IM  IM        IASTM UT x IM PA glides thoracic glides - Im  IM  IASTM UT B - IM           PROM shoulder in all directions left side > right   IM   PROM right shoulder only all directions    Hard end feel with flexion, abd, and ER  Prom right shoulder only all directions     Hard end feel with flexion, abd, and er                    Neuro Re-Ed             Chin tucks   Cues req x 15  Thoracic ext seated x 10 5s hold   Snag x 15           Scap ret cues req x 15  Scap ret w/ rtb x 15 5s hold  Scap ret w/ RTB x 15 5s hold  Scap ret w/ RTB x 15 5s hold  Scap ret + RTB x 20 5s hold  Scap ret + RTB x 20 5s hold         Wall slides x 15  Wall slides x 20  Wall slides x 20  Wall slides x 20  Wall circles w/ ball x 15 B  Wall circles w/ ball x 15 B         GTB x 20 rows cues req  CC 12.5# x 20 rows  Cc 12.5# x 20 rows  Cc 12.5# x 20           GTB ext x 20  GTB ext x  20  GTB ext x 20   Supine flexion  w/ cane x 15     Cane ER stretch x 10  [place] flexion w/ cane x 20     Cane er stretch x 15         Pulleys flexion and ret x 20  Pulleys flexion and ret x 20  Pulleys flexion and ret x 20   Pulleys flexion x 20  Pulleys flexion x 20          Cane flesion supine  x 20  Cane flexion x 20          Ther Ex                UT and LEV stretch x 10  UT stretch x 10   UT stretch x 10            Door way stretch x 10  Door way stretch x 15  Serratus punches x 15             Open books x 5 10s standing hold                                                                          Ther Activity                                       Gait Training                                       Modalities                                         Precautions: standard, chronic shoulder pain, vertigo history, neck pain.   SOC: 10/28/24  POC: 12/09/24  HEP: Access Code: [email or web address]  URL: [email or web address]  Date: 11/05/2024  Prepared by: Karina Jasso    Exercises  - Seated Scapular Retraction  - 1 x daily - 7 x weekly - 3 sets - 10 reps  - Seated Thoracic Lumbar Extension  - 1 x daily - 7 x weekly - 3 sets - 10 reps  - Seated Cervical Retraction and Extension  - 1 x daily - 7 x weekly - 3 sets - 10 reps  - Seated Cervical Retraction and Rotation  - 1 x daily - 7 x weekly - 3 sets - 10 reps

## 2024-11-26 ENCOUNTER — OFFICE VISIT (OUTPATIENT)
Dept: PHYSICAL THERAPY | Facility: CLINIC | Age: 79
End: 2024-11-26
Payer: MEDICARE

## 2024-11-26 DIAGNOSIS — M54.2 NECK PAIN: ICD-10-CM

## 2024-11-26 DIAGNOSIS — M25.511 CHRONIC PAIN OF BOTH SHOULDERS: Primary | ICD-10-CM

## 2024-11-26 DIAGNOSIS — H81.12 BENIGN PAROXYSMAL POSITIONAL VERTIGO OF LEFT EAR: ICD-10-CM

## 2024-11-26 DIAGNOSIS — G89.29 CHRONIC PAIN OF BOTH SHOULDERS: Primary | ICD-10-CM

## 2024-11-26 DIAGNOSIS — M25.512 CHRONIC PAIN OF BOTH SHOULDERS: Primary | ICD-10-CM

## 2024-11-26 PROCEDURE — 97112 NEUROMUSCULAR REEDUCATION: CPT | Performed by: PHYSICAL THERAPIST

## 2024-11-26 PROCEDURE — 97140 MANUAL THERAPY 1/> REGIONS: CPT | Performed by: PHYSICAL THERAPIST

## 2024-11-26 PROCEDURE — 97110 THERAPEUTIC EXERCISES: CPT | Performed by: PHYSICAL THERAPIST

## 2024-11-26 NOTE — PROGRESS NOTES
Daily Note     Today's date: 2024  Patient name: Dami López  : 1945  MRN: [id number]  Referring provider: Nickolas Chung MD  Dx:   Encounter Diagnosis     ICD-10-CM    1. Chronic pain of both shoulders  M25.511     G89.29     M25.512       2. Neck pain  M54.2       3. Benign paroxysmal positional vertigo of left ear  H81.12                      Subjective: Pt reports that he is feeling well today. Continues to have limitations in his right shoulder ROM.       Objective: See treatment diary below      Assessment: Tolerated treatment well. Patient demonstrated fatigue post treatment, exhibited good technique with therapeutic exercises, and would benefit from continued PT      Plan: Continue per plan of care.        Insurance Eval/ Re-eval POC expires Auth Status Total visits  Start date  Expiration date Misc   Medicare - IE 24 Not required  10/28 12/09/24                  Date  10/28/24 10/30 11/04/24 11/12/24 11/14/24 11/19/24 11/21/24 11/26/24     Visits  1 IE  2 3 4 5 6 7 8     Auth: no auth req                           Manuals             Cervical distraction   IM  IM   IM manual cervical distraction  IM  IM IM        IASTM UT x IM PA glides thoracic glides - Im  IM  IASTM UT B - IM           PROM shoulder in all directions left side > right   IM   PROM right shoulder only all directions    Hard end feel with flexion, abd, and ER  Prom right shoulder only all directions     Hard end feel with flexion, abd, and er  PROM right shoulder only all directions                   Neuro Re-Ed             Chin tucks   Cues req x 15  Thoracic ext seated x 10 5s hold   Snag x 15           Scap ret cues req x 15  Scap ret w/ rtb x 15 5s hold  Scap ret w/ RTB x 15 5s hold  Scap ret w/ RTB x 15 5s hold  Scap ret + RTB x 20 5s hold  Scap ret + RTB x 20 5s hold  Scap ret + gtb x 20          Wall slides x 15  Wall slides x 20  Wall slides x 20  Wall slides x 20  Wall circles w/ ball x 15 B  Wall  circles w/ ball x 15 B  Wall circles x 15 B        GTB x 20 rows cues req  CC 12.5# x 20 rows  Cc 12.5# x 20 rows  Cc 12.5# x 20    Rows x 20 12.5#     Ext x 20 12.5# x 20        GTB ext x 20  GTB ext x 20  GTB ext x 20   Supine flexion  w/ cane x 15     Cane ER stretch x 10  [place] flexion w/ cane x 20     Cane er stretch x 15  Serratus punches x 20            Pulleys flexion and ret x 20  Pulleys flexion and ret x 20  Pulleys flexion and ret x 20   Pulleys flexion x 20  Pulleys flexion x 20  Pulleys flexion and ret x 20         Cane flesion supine  x 20  Cane flexion x 20          Ther Ex                UT and LEV stretch x 10  UT stretch x 10   UT stretch x 10            Door way stretch x 10  Door way stretch x 15  Serratus punches x 15             Open books x 5 10s standing hold                                                                          Ther Activity                                       Gait Training                                       Modalities                                         Precautions: standard, chronic shoulder pain, vertigo history, neck pain.   SOC: 10/28/24  POC: 12/09/24  HEP: Access Code: [email or web address]  URL: [email or web address]  Date: 11/05/2024  Prepared by: Karina Jasso    Exercises  - Seated Scapular Retraction  - 1 x daily - 7 x weekly - 3 sets - 10 reps  - Seated Thoracic Lumbar Extension  - 1 x daily - 7 x weekly - 3 sets - 10 reps  - Seated Cervical Retraction and Extension  - 1 x daily - 7 x weekly - 3 sets - 10 reps  - Seated Cervical Retraction and Rotation  - 1 x daily - 7 x weekly - 3 sets - 10 reps

## 2024-12-02 ENCOUNTER — APPOINTMENT (OUTPATIENT)
Dept: PHYSICAL THERAPY | Facility: CLINIC | Age: 79
End: 2024-12-02
Payer: MEDICARE

## 2024-12-04 ENCOUNTER — OFFICE VISIT (OUTPATIENT)
Dept: PHYSICAL THERAPY | Facility: CLINIC | Age: 79
End: 2024-12-04
Payer: MEDICARE

## 2024-12-04 DIAGNOSIS — H81.12 BENIGN PAROXYSMAL POSITIONAL VERTIGO OF LEFT EAR: ICD-10-CM

## 2024-12-04 DIAGNOSIS — M25.512 CHRONIC PAIN OF BOTH SHOULDERS: Primary | ICD-10-CM

## 2024-12-04 DIAGNOSIS — M25.511 CHRONIC PAIN OF BOTH SHOULDERS: Primary | ICD-10-CM

## 2024-12-04 DIAGNOSIS — M54.2 NECK PAIN: ICD-10-CM

## 2024-12-04 DIAGNOSIS — G89.29 CHRONIC PAIN OF BOTH SHOULDERS: Primary | ICD-10-CM

## 2024-12-04 PROCEDURE — 97110 THERAPEUTIC EXERCISES: CPT | Performed by: PHYSICAL THERAPIST

## 2024-12-04 PROCEDURE — 97112 NEUROMUSCULAR REEDUCATION: CPT | Performed by: PHYSICAL THERAPIST

## 2024-12-04 NOTE — PROGRESS NOTES
Daily Note     Today's date: 2024  Patient name: Dami López  : 1945  MRN: [id number]  Referring provider: Nickolas Chung MD  Dx:   Encounter Diagnosis     ICD-10-CM    1. Chronic pain of both shoulders  M25.511     G89.29     M25.512       2. Neck pain  M54.2       3. Benign paroxysmal positional vertigo of left ear  H81.12                      Subjective: Pt reports that he is feeling well and denies any pain. States that over the weekend he did sleep on a flat bed and it resulted in some lightheadedness however, nothing that lingered.       Objective: See treatment diary below      Assessment: Tolerated treatment fair. Patient  responded well to treatment and had no adverse reactions. Pt did experience dizziness for the first time when getting up from the table. Dizziness was described as the room spinning and only lasted a few seconds. Pt did not present with any nystagmus while seated. Pt did have saccade eye movement with visual tracking up and down. Neuro therapist was contacted in order to have a formal evaluation done for patient. Pt will continue to benefit from skilled physical therapy in order to maximize strength and improve quality of movement.       Plan: Continue per plan of care.        Insurance Eval/ Re-eval POC expires Auth Status Total visits  Start date  Expiration date Misc   Medicare - IE 24 Not required  10/28 12/09/24                  Date  10/28/24 10/30 11/04/24 11/12/24 11/14/24 11/19/24 11/21/24 11/26/24 12/04/24    Visits  1 IE  2 3 4 5 6 7 8 9    Auth: no auth req                           Manuals             Cervical distraction   IM  IM   IM manual cervical distraction  IM  IM IM        IASTM UT x IM PA glides thoracic glides - Im  IM  IASTM UT B - IM           PROM shoulder in all directions left side > right   IM   PROM right shoulder only all directions    Hard end feel with flexion, abd, and ER  Prom right shoulder only all directions     Hard  end feel with flexion, abd, and er  PROM right shoulder only all directions  PROM right shoulder only all directions                  Neuro Re-Ed             Chin tucks   Cues req x 15  Thoracic ext seated x 10 5s hold   Snag x 15           Scap ret cues req x 15  Scap ret w/ rtb x 15 5s hold  Scap ret w/ RTB x 15 5s hold  Scap ret w/ RTB x 15 5s hold  Scap ret + RTB x 20 5s hold  Scap ret + RTB x 20 5s hold  Scap ret + gtb x 20    Scap ret x 20       Wall slides x 15  Wall slides x 20  Wall slides x 20  Wall slides x 20  Wall circles w/ ball x 15 B  Wall circles w/ ball x 15 B  Wall circles x 15 B  Chin tucks supine x 20 5s hold       GTB x 20 rows cues req  CC 12.5# x 20 rows  Cc 12.5# x 20 rows  Cc 12.5# x 20    Rows x 20 12.5#     Ext x 20 12.5# x 20  Er strtetch w/ cane       GTB ext x 20  GTB ext x 20  GTB ext x 20   Supine flexion  w/ cane x 15     Cane ER stretch x 10  [place] flexion w/ cane x 20     Cane er stretch x 15  Serratus punches x 20      Flexion w/ cane x 20     Chest press x 20       Pulleys flexion and ret x 20  Pulleys flexion and ret x 20  Pulleys flexion and ret x 20   Pulleys flexion x 20  Pulleys flexion x 20  Pulleys flexion and ret x 20         Cane flesion supine  x 20  Cane flexion x 20          Ther Ex                UT and LEV stretch x 10  UT stretch x 10   UT stretch x 10            Door way stretch x 10  Door way stretch x 15  Serratus punches x 15             Open books x 5 10s standing hold                                                                          Ther Activity                                       Gait Training                                       Modalities                                         Precautions: standard, chronic shoulder pain, vertigo history, neck pain.   SOC: 10/28/24  POC: 12/09/24  HEP: Access Code: [email or web address]  URL: [email or web address]  Date: 11/05/2024  Prepared by: Karina Jasso    Exercises  - Seated Scapular Retraction  - 1 x  daily - 7 x weekly - 3 sets - 10 reps  - Seated Thoracic Lumbar Extension  - 1 x daily - 7 x weekly - 3 sets - 10 reps  - Seated Cervical Retraction and Extension  - 1 x daily - 7 x weekly - 3 sets - 10 reps  - Seated Cervical Retraction and Rotation  - 1 x daily - 7 x weekly - 3 sets - 10 reps

## 2024-12-05 ENCOUNTER — OFFICE VISIT (OUTPATIENT)
Dept: FAMILY MEDICINE CLINIC | Facility: CLINIC | Age: 79
End: 2024-12-05
Payer: MEDICARE

## 2024-12-05 VITALS
BODY MASS INDEX: 35.93 KG/M2 | HEIGHT: 70 IN | SYSTOLIC BLOOD PRESSURE: 144 MMHG | RESPIRATION RATE: 18 BRPM | TEMPERATURE: 97.6 F | DIASTOLIC BLOOD PRESSURE: 70 MMHG | OXYGEN SATURATION: 98 % | WEIGHT: 251 LBS | HEART RATE: 92 BPM

## 2024-12-05 DIAGNOSIS — L29.9 PRURITUS: ICD-10-CM

## 2024-12-05 DIAGNOSIS — R21 RASH: Primary | ICD-10-CM

## 2024-12-05 PROCEDURE — 96372 THER/PROPH/DIAG INJ SC/IM: CPT

## 2024-12-05 PROCEDURE — 99213 OFFICE O/P EST LOW 20 MIN: CPT | Performed by: FAMILY MEDICINE

## 2024-12-05 RX ORDER — PREDNISONE 20 MG/1
TABLET ORAL
Qty: 14 TABLET | Refills: 0 | Status: SHIPPED | OUTPATIENT
Start: 2024-12-05

## 2024-12-05 RX ORDER — DEXAMETHASONE SODIUM PHOSPHATE 4 MG/ML
6 INJECTION, SOLUTION INTRA-ARTICULAR; INTRALESIONAL; INTRAMUSCULAR; INTRAVENOUS; SOFT TISSUE ONCE
Status: COMPLETED | OUTPATIENT
Start: 2024-12-05 | End: 2024-12-05

## 2024-12-05 RX ADMIN — DEXAMETHASONE SODIUM PHOSPHATE 6 MG: 4 INJECTION, SOLUTION INTRA-ARTICULAR; INTRALESIONAL; INTRAMUSCULAR; INTRAVENOUS; SOFT TISSUE at 14:02

## 2024-12-05 NOTE — PROGRESS NOTES
Name: Dami López      : 1945      MRN: [id number]  Encounter Provider: Nickolas Chung MD  Encounter Date: 2024   Encounter department: [facility]  :  Assessment & Plan  Rash    Orders:    dexamethasone (DECADRON) injection 6 mg    predniSONE 20 mg tablet; 2 PO QD X 4 DAYS, THEN 1 PO QD X 4 DAYS, THEN 1/2 PO QD X 4 DAYS    Pruritus    Orders:    dexamethasone (DECADRON) injection 6 mg    predniSONE 20 mg tablet; 2 PO QD X 4 DAYS, THEN 1 PO QD X 4 DAYS, THEN 1/2 PO QD X 4 DAYS          Falls Plan of Care: balance, strength, and gait training instructions were provided.       History of Present Illness     Rash  This is a new problem. The current episode started 1 to 4 weeks ago. The problem has been gradually worsening since onset. The rash is diffuse. The rash is characterized by dryness, redness and scaling. It is unknown if there was an exposure to a precipitant. The rash first occurred at home. Associated symptoms include itching. Pertinent negatives include no anorexia, congestion, cough, decreased physical activity, decreased responsiveness, decreased sleep, drinking less, diarrhea, facial edema, fatigue, fever, joint pain, rhinorrhea, shortness of breath, sore throat or vomiting. Past treatments include topical steroids. The treatment provided no relief. There is no history of allergies, asthma, eczema or varicella. There were no sick contacts.     Review of Systems   Constitutional:  Negative for chills, decreased responsiveness, fatigue and fever.   HENT:  Negative for congestion, rhinorrhea and sore throat.    Eyes:  Negative for discharge.   Respiratory:  Negative for cough, chest tightness and shortness of breath.    Cardiovascular:  Negative for chest pain and palpitations.   Gastrointestinal:  Negative for abdominal pain, anorexia, diarrhea, nausea and vomiting.   Musculoskeletal:  Negative for arthralgias, gait problem and joint pain.   Skin:  Positive  "for itching and rash.   Neurological:  Negative for dizziness, weakness and headaches.   Hematological:  Negative for adenopathy.   Psychiatric/Behavioral:  The patient is not nervous/anxious.           Objective   /70   Pulse 92   Temp 97.6 °F (36.4 °C)   Resp 18   Ht 5' 10\" (1.778 m)   Wt 114 kg (251 lb)   SpO2 98%   BMI 36.01 kg/m²      Physical Exam  Vitals reviewed.   Constitutional:       General: He is not in acute distress.     Appearance: Normal appearance. He is well-developed. He is not ill-appearing.   HENT:      Head: Normocephalic and atraumatic.      Nose: Nose normal.      Mouth/Throat:      Mouth: Mucous membranes are moist.   Eyes:      General:         Right eye: No discharge.         Left eye: No discharge.      Conjunctiva/sclera: Conjunctivae normal.      Pupils: Pupils are equal, round, and reactive to light.   Neck:      Thyroid: No thyromegaly.      Vascular: No JVD.   Cardiovascular:      Rate and Rhythm: Normal rate and regular rhythm.      Heart sounds: Normal heart sounds. No murmur heard.  Pulmonary:      Effort: Pulmonary effort is normal.      Breath sounds: Normal breath sounds. No wheezing or rales.   Abdominal:      General: Bowel sounds are normal.      Palpations: Abdomen is soft. There is no mass.      Tenderness: There is no abdominal tenderness. There is no guarding or rebound.   Musculoskeletal:         General: No tenderness or deformity. Normal range of motion.      Cervical back: Neck supple.   Lymphadenopathy:      Cervical: No cervical adenopathy.   Skin:     General: Skin is warm and dry.      Findings: Rash present. No erythema.   Neurological:      General: No focal deficit present.      Mental Status: He is alert and oriented to person, place, and time.   Psychiatric:         Mood and Affect: Mood normal.         Behavior: Behavior normal.         Thought Content: Thought content normal.         Judgment: Judgment normal.         "

## 2024-12-09 ENCOUNTER — EVALUATION (OUTPATIENT)
Facility: CLINIC | Age: 79
End: 2024-12-09
Payer: MEDICARE

## 2024-12-09 ENCOUNTER — OFFICE VISIT (OUTPATIENT)
Dept: PHYSICAL THERAPY | Facility: CLINIC | Age: 79
End: 2024-12-09
Payer: MEDICARE

## 2024-12-09 DIAGNOSIS — M25.511 CHRONIC PAIN OF BOTH SHOULDERS: Primary | ICD-10-CM

## 2024-12-09 DIAGNOSIS — M25.512 CHRONIC PAIN OF BOTH SHOULDERS: Primary | ICD-10-CM

## 2024-12-09 DIAGNOSIS — H81.12 BENIGN PAROXYSMAL POSITIONAL VERTIGO OF LEFT EAR: ICD-10-CM

## 2024-12-09 DIAGNOSIS — M54.2 NECK PAIN: ICD-10-CM

## 2024-12-09 DIAGNOSIS — R42 DIZZINESS: Primary | ICD-10-CM

## 2024-12-09 DIAGNOSIS — R42 VERTIGO: ICD-10-CM

## 2024-12-09 DIAGNOSIS — G89.29 CHRONIC PAIN OF BOTH SHOULDERS: Primary | ICD-10-CM

## 2024-12-09 PROCEDURE — 97112 NEUROMUSCULAR REEDUCATION: CPT | Performed by: PHYSICAL THERAPIST

## 2024-12-09 PROCEDURE — 97140 MANUAL THERAPY 1/> REGIONS: CPT | Performed by: PHYSICAL THERAPIST

## 2024-12-09 PROCEDURE — 97161 PT EVAL LOW COMPLEX 20 MIN: CPT

## 2024-12-09 PROCEDURE — 97110 THERAPEUTIC EXERCISES: CPT | Performed by: PHYSICAL THERAPIST

## 2024-12-09 NOTE — PROGRESS NOTES
Daily Note     Today's date: 2024  Patient name: Dami López  : 1945  MRN: [id number]  Referring provider: Nickolas Chung MD  Dx:   Encounter Diagnosis     ICD-10-CM    1. Chronic pain of both shoulders  M25.511     G89.29     M25.512       2. Neck pain  M54.2       3. Benign paroxysmal positional vertigo of left ear  H81.12                      Subjective: Pt reports that he is having some light headedness. When getting up from his bed this morning he did experience dizziness. He knows his dizziness is positional and will be seeking treatment this afternoon.       Objective: See treatment diary below      Assessment: Tolerated treatment well. Patient demonstrated fatigue post treatment, exhibited good technique with therapeutic exercises, and would benefit from continued PT Pt responded well to therapeutic exercises with no adverse reactions Pt will continue to benefit from skilled physical therapy in order to maximize scapular stabilizer strength and improve quality of shoulder movement.       Plan: Continue per plan of care.        Insurance Eval/ Re-eval POC expires Auth Status Total visits  Start date  Expiration date Misc   Medicare /- IE 24 Not required  10/28 12/09/24                  Date  10/28/24 10/30 11/04/24 11/12/24 11/14/24 11/19/24 11/21/24 11/26/24 12/04/24 12/09/24   Visits  1 IE  2 3 4 5 6 7 8 9 10   Auth: no auth req                           Manuals             Cervical distraction   IM  IM   IM manual cervical distraction  IM  IM IM        IASTM UT x IM PA glides thoracic glides - Im  IM  IASTM UT B - IM           PROM shoulder in all directions left side > right   IM   PROM right shoulder only all directions    Hard end feel with flexion, abd, and ER  Prom right shoulder only all directions     Hard end feel with flexion, abd, and er  PROM right shoulder only all directions  PROM right shoulder only all directions  IM                 Neuro Re-Ed              Chin tucks   Cues req x 15  Thoracic ext seated x 10 5s hold   Snag x 15      SNAG x 20     SNAG + rotation x 20      Scap ret cues req x 15  Scap ret w/ rtb x 15 5s hold  Scap ret w/ RTB x 15 5s hold  Scap ret w/ RTB x 15 5s hold  Scap ret + RTB x 20 5s hold  Scap ret + RTB x 20 5s hold  Scap ret + gtb x 20    Scap ret x 20  X 20 GTB      Wall slides x 15  Wall slides x 20  Wall slides x 20  Wall slides x 20  Wall circles w/ ball x 15 B  Wall circles w/ ball x 15 B  Wall circles x 15 B  Chin tucks supine x 20 5s hold  X 10 5s hold      GTB x 20 rows cues req  CC 12.5# x 20 rows  Cc 12.5# x 20 rows  Cc 12.5# x 20    Rows x 20 12.5#     Ext x 20 12.5# x 20  Er strtetch w/ cane       GTB ext x 20  GTB ext x 20  GTB ext x 20   Supine flexion  w/ cane x 15     Cane ER stretch x 10  [place] flexion w/ cane x 20     Cane er stretch x 15  Serratus punches x 20      Flexion w/ cane x 20     Chest press x 20  Supine flexion and abd x 20 w/ weight 3#      Pulleys flexion and ret x 20  Pulleys flexion and ret x 20  Pulleys flexion and ret x 20   Pulleys flexion x 20  Pulleys flexion x 20  Pulleys flexion and ret x 20   Pulleys flexion and ret x 20       Cane flesion supine  x 20  Cane flexion x 20          Ther Ex                UT and LEV stretch x 10  UT stretch x 10   UT stretch x 10     UT stretch x 15        Door way stretch x 10  Door way stretch x 15  Serratus punches x 15             Open books x 5 10s standing hold                                                                          Ther Activity                                       Gait Training                                       Modalities                                         Precautions: standard, chronic shoulder pain, vertigo history, neck pain.   SOC: 10/28/24  POC: 12/09/24  HEP: Access Code: [email or web address]  URL: [email or web address]  Date: 11/05/2024  Prepared by: Karina Jasso    Exercises  - Seated Scapular Retraction  - 1 x daily - 7 x  weekly - 3 sets - 10 reps  - Seated Thoracic Lumbar Extension  - 1 x daily - 7 x weekly - 3 sets - 10 reps  - Seated Cervical Retraction and Extension  - 1 x daily - 7 x weekly - 3 sets - 10 reps  - Seated Cervical Retraction and Rotation  - 1 x daily - 7 x weekly - 3 sets - 10 reps

## 2024-12-09 NOTE — PROGRESS NOTES
PT Evaluation          POC expires Unit limit Auth Expiration date PT/OT + Visit Limit? Co-Insurance    no N/a bomn Yes                                              Today's date: 2024  Patient name: Dami López  : 1945  MRN: [id number]  Referring provider: Nickolas Chung MD  Dx:   Encounter Diagnosis     ICD-10-CM    1. Dizziness  R42       2. Vertigo  R42             Assessment  Assessment details: Patient is a 79 y.o. Male who presents to skilled outpatient PT with reports of spinning dizziness x 1 episode when he moved from supine > sitting in ortho PT on 24. He has hx of BPPV that was treated in PT in May 2023. Reports similar symptoms this episode. Of note, he has not had spinning dizziness since initial onset last week. Upon today's assessment, Cervical spine integrity intact per normal and negative results of mVBI, Sharp Uma, and Alar Stability Tests respectively. Completed all positional testing, and Patient did not display nystagmus with positional assessment, even with loaded Gopi Hallpike tests. Subjectively, he has low perception of handicap from dizziness as he scored 16/100 on DHI. Completed most of VOMs, with no increase in symptoms noted and WNL for VOR x 1, head thrust, and head shaking tests. Mild delay in direction change noted with horizontal saccades, but unclear if true deficit vs command following as pt admits to difficulty maintaining attention to task.  Educated the patient on the anatomy of the inner ear, potential for residual dizziness for up to 48 hours following session due to positional testing, and to seek higher level of care if symptoms worsen or change and He was in good verbal understanding. Educated him that it does not appear he requires skilled PT at this time; however, scheduled one follow up appointment with instruction for him to cancel if he remains asymptomatic up until  scheduled visit. If symptoms re-appear, will re-assess positionals and    He will benefit from skilled outpatient PT in order to reduce dizziness symptoms and return to PLOF.    Patient verbalized understanding of POC.    Please contact me if you have any questions or recommendations. Thank you for the referral and the opportunity to share in Dami López's care.      Cut off score   All date taken from [facility] Section or Rehab Measures    DGI:  MDC for Vestibular Disorders: 4 points  MDC for Geriatrics/Community Dwelling Older Adults: 3 Points  Falls risk cut off: <19/24    FGA:  MCID: 4 points  Geriatrics/Community Dwelling Older Adults: </= 22/30 fall risk  Geriatrics/Community Dwelling Older Adults: </= 20/30 unexplained falls in the next 6 months  Parkinsons: </= 18/30 fall risk    mCTSIB (normed on ages 20-60, lower number is less sway or better static balance)  Eyes open firm surface (norm 0.21-0.48)  Eyes closed firm surface (norm 0.48-0.99)  Eyes open foam surface (norm 0.38-0.71)  Eyes closed foam surface (norm 0.70-2.22)    DHI:  0-39: low perception of handicap  40-69: moderate perception of handicap  : severe perception of handicap  > 60: increased risk for falls        Impairments: activity intolerance, impaired balance, lacks appropriate, HEP, safety issue  Understanding of Dx/Px/POC: Excellent  Prognosis: Excellent      Goals    BPPV Goals (4 weeks):  - Patient will report complete resolution of symptoms in order to promote return to PLOF  - Patient will complete FGA in order to promote return to safe performance of ADLs  - Patient will demonstrate (-) [place]-Hallpike test on B/L side  - Patient will demonstrate (-) Roll Test on B/L side        Plan  Plan details: FGA, mCSTIB, re-test positionals, possible DC next session if (-) findings  Patient would benefit from: PT Eval  Planned therapy interventions: balance, HEP, manual therapy, neuromuscular re-education, patient education  Frequency:  "1-3x per week  Duration in weeks: 12  Plan of Care beginning date: 24  Plan of Care expiration date: 12 weeks - 3/3/2025  Treatment plan discussed with: Patient        Subjective Evaluation    History of Present Illness  - Mechanism of injury: Patient was referred from ortho PT  due to reports of room spinning dizziness. He reports he has slept in a recliner for ~ 2 years and was visiting family Thanks weekend and laid flat; then went to PT and  was performing supine there ex and when he sat back up he experienced short duration room spinning. Also Per his report, he had similar vertigo sensation 2 years ago and went to [place] neuro PT, (+) BPPV and was tx and resolved.  Also of note, He says when he take takes Metformin in AM he feels \"like he's in a fog/high\" all the time. Feelings of room spinning was at PT last Weds  (24) without any symptoms since.        Patient goal: to get rid of the dizziness    Dizziness Subjective  - How long does dizziness last: a couple minutes   - How would you describe the dizziness: room spinning   - Rolling in bed: No  - Supine to/from sit: Yes  - Recent hearing loss: No  - Tinnitus: No  - Aural fullness/ear pain: No  - Vision changes: No  - History of recent viral infections: No  - History of migraines: No    Red Flag Screen  - Numbness: No  - Tingling: No  - Weakness: No  - Unilateral hearing loss: No  - Slurred speech: No  - Progressive hearing loss: No  - Tremors: No  - Poor coordination: No  - UMN signs: No  - LoC: No  - Rigidity: No  - Visual field loss: No  - Memory loss: No  - CN dysfunction: No  - Vertical nystagmus: No    Pain  Current pain ratin/10  At best pain ratin/10  At worst pain ratin/10  Location: left sided neck  pain (sees ortho PT), radiates to HA  Aggravating factors: unknown    Social Support  Steps to enter house: yes  Stairs in house: yes   Lives in: multi story home  Lives with: wife    Employment status: retired  Hand " "dominance: right    Treatments  Previous treatment: tx for BPPV in May 2023([facility] office)  Current treatment: initiating neuro PT eval/tx for vertigo; sees ortho PT in [place] for neck  Diagnostic Testing: none recent       Objective         Vestibular Objective  Cervical Spine AROM:  - Flexion: WFL no pain  - Extension: minimal limitation no pain  - R Rotation: minimal limitation no pain  - L Rotation: moderate limitation pain at end range  - R Lateral Flexion: moderate limitation no pain  - L Lateral Flexion: moderate limitation no pain    Integrity Testing  - mVBI: WNL  - Sharp Uma: WNL  - Alar Stability Test: WNL  - Posture: forward head  - Palpation: tightness in cervical musculature     Coordination Screen  - Dysmetria: WNL  - Dysdiadochokinesia: WNL    Positional Testing  - R [place]-Hallpike: negative  - L Gopi-Hallpike: negative  - R Roll Test: negative  - L Roll Test: negative  -L and R loaded [place]  Hallpikes: negative     Oculomotor Screen  - Baseline Symptoms: 0/10  - Spontaneous Nystagmus Room Light: H: Normal and V: Normal   - Smooth Pursuits (central): H: Normal and V: Normal   - Saccades (central): H: Abnormal Dizziness: 0/10, Observation: asymptomatic but slow pace and delayed change of direction  - Near Point Convergence (normal: < 4\"/10 cm - central): n/a  - VORx1: H: Normal and V: Normal  - VOR Cancel (central): n/a  - Head Thrust (moderate to severe hypofunction): H: Normal and V: Normal   - Head Shaking Test (mild hypofunction): H: Normal and V: Normal :      Outcome Measures Initial Eval  12/9/24        mCTSIB  - FTEO (firm)  - FTEC (firm)  - FTEO (foam)  - FTEC (foam)   tba        DGI Defer        FGA tba        10 meter Defer        DHI 16/100                                                          Precautions: n/a  Past Medical History:   Diagnosis Date    Diabetes mellitus (HCC)     Neoplasm of uncertain behavior of skin     Neck-Last assessed 06/13/17       "

## 2024-12-11 ENCOUNTER — OFFICE VISIT (OUTPATIENT)
Dept: PHYSICAL THERAPY | Facility: CLINIC | Age: 79
End: 2024-12-11
Payer: MEDICARE

## 2024-12-11 DIAGNOSIS — G89.29 CHRONIC PAIN OF BOTH SHOULDERS: ICD-10-CM

## 2024-12-11 DIAGNOSIS — R42 DIZZINESS: ICD-10-CM

## 2024-12-11 DIAGNOSIS — R42 VERTIGO: ICD-10-CM

## 2024-12-11 DIAGNOSIS — M54.2 NECK PAIN: Primary | ICD-10-CM

## 2024-12-11 DIAGNOSIS — M25.511 CHRONIC PAIN OF BOTH SHOULDERS: ICD-10-CM

## 2024-12-11 DIAGNOSIS — M25.512 CHRONIC PAIN OF BOTH SHOULDERS: ICD-10-CM

## 2024-12-11 DIAGNOSIS — H81.12 BENIGN PAROXYSMAL POSITIONAL VERTIGO OF LEFT EAR: ICD-10-CM

## 2024-12-11 PROCEDURE — 97112 NEUROMUSCULAR REEDUCATION: CPT | Performed by: PHYSICAL THERAPIST

## 2024-12-11 PROCEDURE — 97140 MANUAL THERAPY 1/> REGIONS: CPT | Performed by: PHYSICAL THERAPIST

## 2024-12-11 PROCEDURE — 97110 THERAPEUTIC EXERCISES: CPT | Performed by: PHYSICAL THERAPIST

## 2024-12-11 NOTE — PROGRESS NOTES
Daily Note     Today's date: 2024  Patient name: Dami López  : 1945  MRN: [id number]  Referring provider: Nickolas Chung MD  Dx:   Encounter Diagnosis     ICD-10-CM    1. Neck pain  M54.2       2. Dizziness  R42       3. Vertigo  R42       4. Chronic pain of both shoulders  M25.511     G89.29     M25.512       5. Benign paroxysmal positional vertigo of left ear  H81.12                      Subjective: Pt reports  he is pleased with overall progress and denies any pn his neck. Continues to have difficulty lifting his arm overhead. Pt pain in shoulder when reaching overhead is 4/10      Objective: See treatment diary below      Assessment: Tolerated treatment well. Patient demonstrated fatigue post treatment, exhibited good technique with therapeutic exercises, and would benefit from continued PT      Plan: Continue per plan of care.          Insurance Eval/ Re-eval POC expires Auth Status Total visits  Start date  Expiration date Misc   Medicare /- IE 24 Not required  10/28 12/09/24                  Date  10/28/24 10/30 11/04/24 11/12/24 11/14/24 11/19/24 11/21/24 11/26/24 12/04/24 12/09/24 12/11/24   Visits  1 IE  2 3 4 5 6 7 8 9 10 11   Auth: no auth req                             Manuals              Cervical distraction   IM  IM   IM manual cervical distraction  IM  IM IM    STM UT and cervical PVM-IM       IASTM UT x IM PA glides thoracic glides - Im  IM  IASTM UT B - IM            PROM shoulder in all directions left side > right   IM   PROM right shoulder only all directions    Hard end feel with flexion, abd, and ER  Prom right shoulder only all directions     Hard end feel with flexion, abd, and er  PROM right shoulder only all directions  PROM right shoulder only all directions  IM                   Neuro Re-Ed              Chin tucks   Cues req x 15  Thoracic ext seated x 10 5s hold   Snag x 15      SNAG x 20     SNAG + rotation x 20  X 20       X 20 ea.      Scap ret  cues req x 15  Scap ret w/ rtb x 15 5s hold  Scap ret w/ RTB x 15 5s hold  Scap ret w/ RTB x 15 5s hold  Scap ret + RTB x 20 5s hold  Scap ret + RTB x 20 5s hold  Scap ret + gtb x 20    Scap ret x 20  X 20 GTB   GTB x 20      Wall slides x 15  Wall slides x 20  Wall slides x 20  Wall slides x 20  Wall circles w/ ball x 15 B  Wall circles w/ ball x 15 B  Wall circles x 15 B  Chin tucks supine x 20 5s hold  X 10 5s hold  Chin tucks x 20      GTB x 20 rows cues req  CC 12.5# x 20 rows  Cc 12.5# x 20 rows  Cc 12.5# x 20    Rows x 20 12.5#     Ext x 20 12.5# x 20  Er strtetch w/ cane   Table slides w/ ball roll out x 20        GTB ext x 20  GTB ext x 20  GTB ext x 20   Supine flexion  w/ cane x 15     Cane ER stretch x 10  [place] flexion w/ cane x 20     Cane er stretch x 15  Serratus punches x 20      Flexion w/ cane x 20     Chest press x 20  Supine flexion and abd x 20 w/ weight 3#  Cervical rotation AROM w/ OP x 20      Pulleys flexion and ret x 20  Pulleys flexion and ret x 20  Pulleys flexion and ret x 20   Pulleys flexion x 20  Pulleys flexion x 20  Pulleys flexion and ret x 20   Pulleys flexion and ret x 20  Pulleys x 20 ea. Flexion and ret.       Cane flesion supine  x 20  Cane flexion x 20           Ther Ex                 UT and LEV stretch x 10  UT stretch x 10   UT stretch x 10     UT stretch x 15  UT stretch x 20        Door way stretch x 10  Door way stretch x 15  Serratus punches x 15              Open books x 5 10s standing hold                                                                                Ther Activity                                          Gait Training                                          Modalities                                            Precautions: standard, chronic shoulder pain, vertigo history, neck pain.   SOC: 10/28/24  POC: 12/09/24  HEP: Access Code: [email or web address]  URL: [email or web address]  Date: 11/05/2024  Prepared by: Karina Jasso    Exercises  - Seated  Scapular Retraction  - 1 x daily - 7 x weekly - 3 sets - 10 reps  - Seated Thoracic Lumbar Extension  - 1 x daily - 7 x weekly - 3 sets - 10 reps  - Seated Cervical Retraction and Extension  - 1 x daily - 7 x weekly - 3 sets - 10 reps  - Seated Cervical Retraction and Rotation  - 1 x daily - 7 x weekly - 3 sets - 10 reps

## 2024-12-12 ENCOUNTER — APPOINTMENT (OUTPATIENT)
Facility: CLINIC | Age: 79
End: 2024-12-12
Payer: MEDICARE

## 2024-12-16 ENCOUNTER — EVALUATION (OUTPATIENT)
Dept: PHYSICAL THERAPY | Facility: CLINIC | Age: 79
End: 2024-12-16
Payer: MEDICARE

## 2024-12-16 DIAGNOSIS — M54.2 NECK PAIN: Primary | ICD-10-CM

## 2024-12-16 DIAGNOSIS — M25.512 CHRONIC PAIN OF BOTH SHOULDERS: ICD-10-CM

## 2024-12-16 DIAGNOSIS — G89.29 CHRONIC PAIN OF BOTH SHOULDERS: ICD-10-CM

## 2024-12-16 DIAGNOSIS — M25.511 CHRONIC PAIN OF BOTH SHOULDERS: ICD-10-CM

## 2024-12-16 PROCEDURE — 97112 NEUROMUSCULAR REEDUCATION: CPT | Performed by: PHYSICAL THERAPIST

## 2024-12-16 PROCEDURE — 97164 PT RE-EVAL EST PLAN CARE: CPT | Performed by: PHYSICAL THERAPIST

## 2024-12-16 PROCEDURE — 97110 THERAPEUTIC EXERCISES: CPT | Performed by: PHYSICAL THERAPIST

## 2024-12-16 NOTE — PROGRESS NOTES
PT Evaluation     Today's date: 2024  Patient name: Dami López  : 1945  MRN: [id number]  Referring provider: Nickolas Chung MD  Dx:   Encounter Diagnosis     ICD-10-CM    1. Neck pain  M54.2       2. Chronic pain of both shoulders  M25.511     G89.29     M25.512                      Assessment  Impairments: abnormal or restricted ROM, activity intolerance, impaired balance, impaired physical strength, lacks appropriate home exercise program, pain with function, poor posture , poor body mechanics, activity limitations and endurance    Assessment details: Update 24: Pt reports that he is feeling about 60% better since starting therapy. Pt demos improvements in cervical ROM, shoulder scapular strength, and improved pain with functional activities. Pt will be transitioning to independent program at the end of the month.     Dami López is a 79 y.o. male who presents with pain, decreased strength, decreased ROM, impaired sensation, postural dysfunction, and balance dysfunction. Due to these impairments, patient has difficulty performing ADL's, recreational activities, work-related activities, engaging in social activities, lifting/carrying, reaching. Patient's clinical presentation is consistent with their referring diagnosis of Chronic pain of both shoulders, Neck pain, Benign paroxysmal positional vertigo of left ear. Patient has been educated in home exercise program and plan of care. Patient would benefit from skilled physical therapy services to address their aforementioned functional limitations and progress towards prior level of function and independence with home exercise program.     Barriers to intervention: behavior and medical complexity  Understanding of Dx/Px/POC: good     Prognosis: good    Goals  Short Term Goals to be accomplished in 3 weeks:  STG1: Pt will be I with HEP  STG2: Pt will be I with posture management  STG3: Pt will demo Cervical AROM >50%  improvement  STG4: Pt will deny sleep disturbance due to pain     Long Term Goals to be accomplished in 6 weeks:   LTG1: Pt will demo cervical stab/shoulder strength to WNL as per PLOF  LTG2: Pt will return to driving, turning head, and gardening as per PLOF pain free  LTG3: Pt will demo cervical AROM WNL      Plan  Patient would benefit from: PT eval and skilled physical therapy  Planned modality interventions: cryotherapy and thermotherapy: hydrocollator packs    Planned therapy interventions: manual therapy, neuromuscular re-education, self care, therapeutic activities, therapeutic exercise and home exercise program    Frequency: 2x week  Duration in weeks: 6  Plan of Care beginning date: 10/28/2024  Plan of Care expiration date: 2024  Treatment plan discussed with: patient  Plan details:  HEP development, stretching, strengthening, A/AA/PROM, joint mobilizations, posture education, STM/MI as needed to reduce muscle tension, muscle reeducation, patient has been educated in Dx, prognosis and plan of care and is in agreement.            Subjective Evaluation    History of Present Illness  Mechanism of injury: Pt reports that he has been dealing with neck pain for about three to four months. States that he is having a hard time moving his head. States that he is having difficulty cutting grass, turning his head to the left when driving, difficulty putting on a shirt/sweater, and pain is present when he lays on his left side. States that he has some numbness in his left hand and also reports weakness. Pt does have a history if diabetes and chronic bilateral shoulder pain.   Patient Goals  Patient goals for therapy: increased strength, independence with ADLs/IADLs, return to sport/leisure activities, return to work, increased motion, improved balance and decreased pain    Pain  Current pain rating: 3  At best pain rating: 3  At worst pain ratin  Quality: discomfort, sharp, pressure and tight  Relieving  factors: medications, relaxation, rest and heat    Social Support  Steps to enter house: yes  Stairs in house: yes   Lives in: multiple-level home  Lives with: spouse    Employment status: not working  Hand dominance: right    Treatments  Previous treatment: physical therapy  Current treatment: physical therapy      Objective     Postural Observations  Seated posture: fair  Standing posture: poor      Active Range of Motion   Cervical/Thoracic Spine       Cervical    Flexion:  WFL  Extension: 10 degrees     with pain  Left lateral flexion:  WFL  Right lateral flexion:  WFL    Scapular Mobility   Left Shoulder   Scapular mobility: fair  Scapular Mobility with Shoulder to 90° FF   Scapular winging: moderate    Right Shoulder   Scapular mobility: fair  Scapular Mobility with Shoulder to 90° FF   Scapular winging: moderate    Joint Play   Joints within functional limits: T5, T6, T7 and T8     Hypomobile: T1, T2, T3 and T4     Strength/Myotome Testing     Left Shoulder     Planes of Motion   Flexion: 4-   Extension: 4-   Abduction: 4-   External rotation at 0°: 4-     Right Shoulder     Planes of Motion   Flexion: 4-   Extension: 4-   Abduction: 4-   External rotation at 0°: 4-     Tests   Cervical   Negative vertical compression, cervical distraction, alar ligament test, VBI and neck flexor muscle endurance test.     Left Shoulder   Positive empty can and Neer's.   Negative ULTT1.     Right Shoulder   Positive empty can and Neer's.   Negative ULTT1.     Lumbar   Negative vertical compression.         Insurance Eval/ Re-eval POC expires Auth Status Total visits  Start date  Expiration date Misc   Medicare 128/24- IE 12/09/24 Not required  10/28 12/09/24                  Date  10/28/24 10/30 11/04/24 11/12/24 11/14/24 11/19/24 11/21/24 11/26/24 12/04/24 12/09/24 12/11/24 12/16/24   Visits  1 IE  2 3 4 5 6 7 8 9 10 11 12   Auth: no auth req                               Manuals               Cervical distraction   IM  IM    IM manual cervical distraction  IM  IM IM    STM UT and cervical PVM-IM        IASTM UT x IM PA glides thoracic glides - Im  IM  IASTM UT B - IM        Re-eval      PROM shoulder in all directions left side > right   IM   PROM right shoulder only all directions    Hard end feel with flexion, abd, and ER  Prom right shoulder only all directions     Hard end feel with flexion, abd, and er  PROM right shoulder only all directions  PROM right shoulder only all directions  IM                     Neuro Re-Ed               Chin tucks   Cues req x 15  Thoracic ext seated x 10 5s hold   Snag x 15      SNAG x 20     SNAG + rotation x 20  X 20       X 20 ea.  X 20       X 20 ea.      Scap ret cues req x 15  Scap ret w/ rtb x 15 5s hold  Scap ret w/ RTB x 15 5s hold  Scap ret w/ RTB x 15 5s hold  Scap ret + RTB x 20 5s hold  Scap ret + RTB x 20 5s hold  Scap ret + gtb x 20    Scap ret x 20  X 20 GTB   GTB x 20       Wall slides x 15  Wall slides x 20  Wall slides x 20  Wall slides x 20  Wall circles w/ ball x 15 B  Wall circles w/ ball x 15 B  Wall circles x 15 B  Chin tucks supine x 20 5s hold  X 10 5s hold  Chin tucks x 20  Bent over rows and extension x 20      GTB x 20 rows cues req  CC 12.5# x 20 rows  Cc 12.5# x 20 rows  Cc 12.5# x 20    Rows x 20 12.5#     Ext x 20 12.5# x 20  Er strtetch w/ cane   Table slides w/ ball roll out x 20         GTB ext x 20  GTB ext x 20  GTB ext x 20   Supine flexion  w/ cane x 15     Cane ER stretch x 10  [place] flexion w/ cane x 20     Cane er stretch x 15  Serratus punches x 20      Flexion w/ cane x 20     Chest press x 20  Supine flexion and abd x 20 w/ weight 3#  Cervical rotation AROM w/ OP x 20       Pulleys flexion and ret x 20  Pulleys flexion and ret x 20  Pulleys flexion and ret x 20   Pulleys flexion x 20  Pulleys flexion x 20  Pulleys flexion and ret x 20   Pulleys flexion and ret x 20  Pulleys x 20 ea. Flexion and ret.  Pulleys x 20 ea. Flexion and ret       Cane flesion supine   x 20  Cane flexion x 20            Ther Ex                  UT and LEV stretch x 10  UT stretch x 10   UT stretch x 10     UT stretch x 15  UT stretch x 20  UT stretch x 20        Door way stretch x 10  Door way stretch x 15  Serratus punches x 15               Open books x 5 10s standing hold                                                                                      Ther Activity                                             Gait Training                                             Modalities                                               Precautions: standard, chronic shoulder pain, vertigo history, neck pain.   SOC: 10/28/24  POC: 12/09/24  HEP: Access Code: [email or web address]  URL: [email or web address]  Date: 11/05/2024  Prepared by: Karina Jasso    Exercises  - Seated Scapular Retraction  - 1 x daily - 7 x weekly - 3 sets - 10 reps  - Seated Thoracic Lumbar Extension  - 1 x daily - 7 x weekly - 3 sets - 10 reps  - Seated Cervical Retraction and Extension  - 1 x daily - 7 x weekly - 3 sets - 10 reps  - Seated Cervical Retraction and Rotation  - 1 x daily - 7 x weekly - 3 sets - 10 reps

## 2024-12-18 ENCOUNTER — OFFICE VISIT (OUTPATIENT)
Dept: PHYSICAL THERAPY | Facility: CLINIC | Age: 79
End: 2024-12-18
Payer: MEDICARE

## 2024-12-18 DIAGNOSIS — G89.29 CHRONIC PAIN OF BOTH SHOULDERS: ICD-10-CM

## 2024-12-18 DIAGNOSIS — M25.511 CHRONIC PAIN OF BOTH SHOULDERS: ICD-10-CM

## 2024-12-18 DIAGNOSIS — M25.512 CHRONIC PAIN OF BOTH SHOULDERS: ICD-10-CM

## 2024-12-18 DIAGNOSIS — M54.2 NECK PAIN: Primary | ICD-10-CM

## 2024-12-18 PROCEDURE — 97110 THERAPEUTIC EXERCISES: CPT | Performed by: PHYSICAL THERAPIST

## 2024-12-18 PROCEDURE — 97112 NEUROMUSCULAR REEDUCATION: CPT | Performed by: PHYSICAL THERAPIST

## 2024-12-18 NOTE — PROGRESS NOTES
Daily Note     Today's date: 2024  Patient name: Dami López  : 1945  MRN: [id number]  Referring provider: Nickolas Chung MD  Dx:   Encounter Diagnosis     ICD-10-CM    1. Neck pain  M54.2       2. Chronic pain of both shoulders  M25.511     G89.29     M25.512                      Subjective: Pt is doing well and is please with overall progress. States that he is feeling ready to be transition to gym.        Objective: See treatment diary below      Assessment: Tolerated treatment well. Patient demonstrated fatigue post treatment, exhibited good technique with therapeutic exercises, and would benefit from continued PT      Plan: Continue per plan of care.            Insurance Eval/ Re-eval POC expires Auth Status Total visits  Start date  Expiration date Misc   Medicare - IE 24 Not required  10/28 12/09/24                  Date  10/28/24 10/30 11/04/24 11/12/24 11/14/24 11/19/24 11/21/24 11/26/24 12/04/24 12/09/24 12/11/24 12/16/24 12/18/24   Visits  1 IE  2 3 4 5 6 7 8 9 10 11 12 13   Auth: no auth req                                 Manuals                Cervical distraction   IM  IM   IM manual cervical distraction  IM  IM IM    STM UT and cervical PVM-IM         IASTM UT x IM PA glides thoracic glides - Im  IM  IASTM UT B - IM        Re-eval       PROM shoulder in all directions left side > right   IM   PROM right shoulder only all directions    Hard end feel with flexion, abd, and ER  Prom right shoulder only all directions     Hard end feel with flexion, abd, and er  PROM right shoulder only all directions  PROM right shoulder only all directions  IM                       Neuro Re-Ed                Chin tucks   Cues req x 15  Thoracic ext seated x 10 5s hold   Snag x 15      SNAG x 20     SNAG + rotation x 20  X 20       X 20 ea.  X 20       X 20 ea.  X 20     X 20 ea.      Scap ret cues req x 15  Scap ret w/ rtb x 15 5s hold  Scap ret w/ RTB x 15 5s hold  Scap ret w/ RTB x  15 5s hold  Scap ret + RTB x 20 5s hold  Scap ret + RTB x 20 5s hold  Scap ret + gtb x 20    Scap ret x 20  X 20 GTB   GTB x 20   GTB x 20      Wall slides x 15  Wall slides x 20  Wall slides x 20  Wall slides x 20  Wall circles w/ ball x 15 B  Wall circles w/ ball x 15 B  Wall circles x 15 B  Chin tucks supine x 20 5s hold  X 10 5s hold  Chin tucks x 20  Bent over rows and extension x 20  Bent over rows and extension xv20      GTB x 20 rows cues req  CC 12.5# x 20 rows  Cc 12.5# x 20 rows  Cc 12.5# x 20    Rows x 20 12.5#     Ext x 20 12.5# x 20  Er strtetch w/ cane   Table slides w/ ball roll out x 20          GTB ext x 20  GTB ext x 20  GTB ext x 20   Supine flexion  w/ cane x 15     Cane ER stretch x 10  [place] flexion w/ cane x 20     Cane er stretch x 15  Serratus punches x 20      Flexion w/ cane x 20     Chest press x 20  Supine flexion and abd x 20 w/ weight 3#  Cervical rotation AROM w/ OP x 20        Pulleys flexion and ret x 20  Pulleys flexion and ret x 20  Pulleys flexion and ret x 20   Pulleys flexion x 20  Pulleys flexion x 20  Pulleys flexion and ret x 20   Pulleys flexion and ret x 20  Pulleys x 20 ea. Flexion and ret.  Pulleys x 20 ea. Flexion and ret  Pulleys x 20 flexion and ret       Cane flesion supine  x 20  Cane flexion x 20             Ther Ex                   UT and LEV stretch x 10  UT stretch x 10   UT stretch x 10     UT stretch x 15  UT stretch x 20  UT stretch x 20  UT stretch x 20        Door way stretch x 10  Door way stretch x 15  Serratus punches x 15                Open books x 5 10s standing hold                                                                                            Ther Activity                                                Gait Training                                                Modalities                                                  Precautions: standard, chronic shoulder pain, vertigo history, neck pain.   SOC: 10/28/24  POC: 12/09/24  HEP: Access  Code: [email or web address]  URL: [email or web address]  Date: 11/05/2024  Prepared by: Karina Jasso    Exercises  - Seated Scapular Retraction  - 1 x daily - 7 x weekly - 3 sets - 10 reps  - Seated Thoracic Lumbar Extension  - 1 x daily - 7 x weekly - 3 sets - 10 reps  - Seated Cervical Retraction and Extension  - 1 x daily - 7 x weekly - 3 sets - 10 reps  - Seated Cervical Retraction and Rotation  - 1 x daily - 7 x weekly - 3 sets - 10 reps

## 2024-12-24 ENCOUNTER — OFFICE VISIT (OUTPATIENT)
Dept: PHYSICAL THERAPY | Facility: CLINIC | Age: 79
End: 2024-12-24
Payer: MEDICARE

## 2024-12-24 DIAGNOSIS — M25.512 CHRONIC PAIN OF BOTH SHOULDERS: ICD-10-CM

## 2024-12-24 DIAGNOSIS — M25.511 CHRONIC PAIN OF BOTH SHOULDERS: ICD-10-CM

## 2024-12-24 DIAGNOSIS — M54.2 NECK PAIN: Primary | ICD-10-CM

## 2024-12-24 DIAGNOSIS — G89.29 CHRONIC PAIN OF BOTH SHOULDERS: ICD-10-CM

## 2024-12-24 PROCEDURE — 97110 THERAPEUTIC EXERCISES: CPT | Performed by: PHYSICAL THERAPIST

## 2024-12-24 NOTE — PROGRESS NOTES
"Chief Complaint  Post-op    Subjective          History of Present Illness  The patient is here to follow up on excision of scalp lesion.  They are doing well and have no complaints. Continues to pack wound.  His leg MRI showed no worrisome lesions and a prior injury accounts for upper thigh findings.           Objective   Vital Signs:   Resp 16   Ht 182.9 cm (72\")   Wt 121 kg (267 lb 9.6 oz)   BMI 36.29 kg/m²     Physical Exam  Vitals and nursing note reviewed.   Constitutional:       General: He is not in acute distress.     Appearance: Normal appearance. He is well-developed.   HENT:      Head: Normocephalic and atraumatic.   Eyes:      Extraocular Movements: Extraocular movements intact.      Pupils: Pupils are equal, round, and reactive to light.   Cardiovascular:      Pulses: Normal pulses.   Pulmonary:      Effort: Pulmonary effort is normal. No retractions.      Breath sounds: Normal air entry. No wheezing.   Abdominal:      General: There is no distension.      Palpations: Abdomen is soft.      Tenderness: There is no abdominal tenderness.      Hernia: No hernia is present.   Musculoskeletal:         General: No swelling or deformity.      Cervical back: Neck supple.   Skin:     General: Skin is warm and dry.      Findings: No erythema.      Comments: Surgical Incision Healing Well   Neurological:      General: No focal deficit present.      Mental Status: He is alert and oriented to person, place, and time.      Motor: Motor function is intact.   Psychiatric:         Mood and Affect: Mood normal.         Thought Content: Thought content normal.      Incision packed    Result Review :                 Assessment and Plan    Diagnoses and all orders for this visit:    1. Scalp lesion (Primary)    Will need dermatology referral  Followup one month  Continue local wound care    Follow Up   Return in about 1 month (around 7/10/2023).  Patient was given instructions and counseling regarding his condition or " Discharge/Daily Note     Today's date: 2024  Patient name: aDmi López  : 1945  MRN: [id number]  Referring provider: Nickolas Chung MD  Dx:   Encounter Diagnosis     ICD-10-CM    1. Neck pain  M54.2       2. Chronic pain of both shoulders  M25.511     G89.29     M25.512                      Subjective: Pt reports that he is feeling well and ready to be discharged at this time. He is to begin independent program for strengthening       Objective: See treatment diary below      Assessment: Tolerated treatment well. Patient demonstrated fatigue post treatment, exhibited good technique with therapeutic exercises, and would benefit from continued PT      Plan: Continue per plan of care.        Insurance Eval/ Re-eval POC expires Auth Status Total visits  Start date  Expiration date Misc   Medicare - IE 24 Not required  10/28 12/09/24                  Date  10/28/24 10/30 11/04/24 11/12/24 11/14/24 11/19/24 11/21/24 11/26/24 12/04/24 12/09/24 12/11/24 12/16/24 12/24/24   Visits  1 IE  2 3 4 5 6 7 8 9 10 11 12 14   Auth: no auth req                                 Manuals                Cervical distraction   IM  IM   IM manual cervical distraction  IM  IM IM    STM UT and cervical PVM-IM         IASTM UT x IM PA glides thoracic glides - Im  IM  IASTM UT B - IM        Re-eval       PROM shoulder in all directions left side > right   IM   PROM right shoulder only all directions    Hard end feel with flexion, abd, and ER  Prom right shoulder only all directions     Hard end feel with flexion, abd, and er  PROM right shoulder only all directions  PROM right shoulder only all directions  IM                       Neuro Re-Ed                Chin tucks   Cues req x 15  Thoracic ext seated x 10 5s hold   Snag x 15      SNAG x 20     SNAG + rotation x 20  X 20       X 20 ea.  X 20       X 20 ea.  X 20     X 20 ea.      Scap ret cues req x 15  Scap ret w/ rtb x 15 5s hold  Scap ret w/ RTB x 15 5s  for health maintenance advice. Please see specific information pulled into the AVS if appropriate.      hold  Scap ret w/ RTB x 15 5s hold  Scap ret + RTB x 20 5s hold  Scap ret + RTB x 20 5s hold  Scap ret + gtb x 20    Scap ret x 20  X 20 GTB   GTB x 20   GTB x 20    Sled push x 3 laps 50 ft       Wall slides x 15  Wall slides x 20  Wall slides x 20  Wall slides x 20  Wall circles w/ ball x 15 B  Wall circles w/ ball x 15 B  Wall circles x 15 B  Chin tucks supine x 20 5s hold  X 10 5s hold  Chin tucks x 20  Bent over rows and extension x 20  Bent over rows and extension xv20      GTB x 20 rows cues req  CC 12.5# x 20 rows  Cc 12.5# x 20 rows  Cc 12.5# x 20    Rows x 20 12.5#     Ext x 20 12.5# x 20  Er strtetch w/ cane   Table slides w/ ball roll out x 20     Bicep curls x 20 5#      GTB ext x 20  GTB ext x 20  GTB ext x 20   Supine flexion  w/ cane x 15     Cane ER stretch x 10  [place]ory flexion w/ cane x 20     Cane er stretch x 15  Serratus punches x 20      Flexion w/ cane x 20     Chest press x 20  Supine flexion and abd x 20 w/ weight 3#  Cervical rotation AROM w/ OP x 20        Pulleys flexion and ret x 20  Pulleys flexion and ret x 20  Pulleys flexion and ret x 20   Pulleys flexion x 20  Pulleys flexion x 20  Pulleys flexion and ret x 20   Pulleys flexion and ret x 20  Pulleys x 20 ea. Flexion and ret.  Pulleys x 20 ea. Flexion and ret  Pulleys x 20 flexion and ret       Cane flesion supine  x 20  Cane flexion x 20             Ther Ex                   UT and LEV stretch x 10  UT stretch x 10   UT stretch x 10     UT stretch x 15  UT stretch x 20  UT stretch x 20  UT stretch x 20        Door way stretch x 10  Door way stretch x 15  Serratus punches x 15                Open books x 5 10s standing hold                                                                                            Ther Activity                                                Gait Training                                                Modalities                                                  Precautions: standard, chronic shoulder  pain, vertigo history, neck pain.   SOC: 10/28/24  POC: 12/09/24  HEP: Access Code: [email or web address]  URL: [email or web address]  Date: 11/05/2024  Prepared by: Karina Jasso    Exercises  - Seated Scapular Retraction  - 1 x daily - 7 x weekly - 3 sets - 10 reps  - Seated Thoracic Lumbar Extension  - 1 x daily - 7 x weekly - 3 sets - 10 reps  - Seated Cervical Retraction and Extension  - 1 x daily - 7 x weekly - 3 sets - 10 reps  - Seated Cervical Retraction and Rotation  - 1 x daily - 7 x weekly - 3 sets - 10 reps

## 2024-12-26 ENCOUNTER — APPOINTMENT (OUTPATIENT)
Dept: PHYSICAL THERAPY | Facility: CLINIC | Age: 79
End: 2024-12-26
Payer: MEDICARE

## 2025-01-15 ENCOUNTER — RA CDI HCC (OUTPATIENT)
Dept: OTHER | Facility: HOSPITAL | Age: 80
End: 2025-01-15

## 2025-01-22 ENCOUNTER — OFFICE VISIT (OUTPATIENT)
Dept: FAMILY MEDICINE CLINIC | Facility: CLINIC | Age: 80
End: 2025-01-22
Payer: MEDICARE

## 2025-01-22 VITALS
HEART RATE: 83 BPM | SYSTOLIC BLOOD PRESSURE: 128 MMHG | HEIGHT: 70 IN | BODY MASS INDEX: 35.36 KG/M2 | OXYGEN SATURATION: 98 % | TEMPERATURE: 96.7 F | RESPIRATION RATE: 20 BRPM | WEIGHT: 247 LBS | DIASTOLIC BLOOD PRESSURE: 82 MMHG

## 2025-01-22 DIAGNOSIS — E66.01 OBESITY, MORBID (HCC): ICD-10-CM

## 2025-01-22 DIAGNOSIS — E11.9 TYPE 2 DIABETES MELLITUS WITHOUT COMPLICATION, WITHOUT LONG-TERM CURRENT USE OF INSULIN (HCC): Primary | ICD-10-CM

## 2025-01-22 DIAGNOSIS — E11.22 TYPE 2 DIABETES MELLITUS WITH STAGE 3 CHRONIC KIDNEY DISEASE, WITHOUT LONG-TERM CURRENT USE OF INSULIN, UNSPECIFIED WHETHER STAGE 3A OR 3B CKD (HCC): ICD-10-CM

## 2025-01-22 DIAGNOSIS — N18.30 TYPE 2 DIABETES MELLITUS WITH STAGE 3 CHRONIC KIDNEY DISEASE, WITHOUT LONG-TERM CURRENT USE OF INSULIN, UNSPECIFIED WHETHER STAGE 3A OR 3B CKD (HCC): ICD-10-CM

## 2025-01-22 DIAGNOSIS — K58.0 IRRITABLE BOWEL SYNDROME WITH DIARRHEA: ICD-10-CM

## 2025-01-22 DIAGNOSIS — N18.30 STAGE 3 CHRONIC KIDNEY DISEASE, UNSPECIFIED WHETHER STAGE 3A OR 3B CKD (HCC): ICD-10-CM

## 2025-01-22 DIAGNOSIS — D64.9 ANEMIA, UNSPECIFIED TYPE: ICD-10-CM

## 2025-01-22 PROBLEM — S02.2XXA CLOSED FRACTURE OF NASAL BONE: Status: RESOLVED | Noted: 2023-10-18 | Resolved: 2025-01-22

## 2025-01-22 PROBLEM — R42 DIZZINESS: Status: RESOLVED | Noted: 2023-04-27 | Resolved: 2025-01-22

## 2025-01-22 LAB — SL AMB POCT HEMOGLOBIN AIC: 9 (ref ?–6.5)

## 2025-01-22 PROCEDURE — 99214 OFFICE O/P EST MOD 30 MIN: CPT | Performed by: FAMILY MEDICINE

## 2025-01-22 PROCEDURE — 83036 HEMOGLOBIN GLYCOSYLATED A1C: CPT | Performed by: FAMILY MEDICINE

## 2025-01-22 PROCEDURE — G2211 COMPLEX E/M VISIT ADD ON: HCPCS | Performed by: FAMILY MEDICINE

## 2025-01-22 NOTE — PATIENT INSTRUCTIONS
CONTINUE CURRENT TREATMENT PLAN  MONITOR DIETARY SODIUM, CHOL, AND CARBOHYDRATE INTAKE  ENCOURAGE PHYSICAL ACTIVITY  FOOT CARE    RV 3 M, SOONER PRN      Patient Education     Type 2 diabetes   The Basics   Written by the doctors and editors at [facility]   What is type 2 diabetes? -- This is a disorder that disrupts the way the body uses sugar. It is sometimes called type 2 diabetes mellitus.  All of the cells in the body need sugar to work normally. Sugar gets into the cells with the help of a hormone called insulin. Insulin is made by the pancreas, an organ in the belly. If there is not enough insulin, or if cells in the body don't respond normally to insulin, sugar builds up in the blood. That is what happens to people with diabetes.  There are 2 different types of diabetes:   In type 1 diabetes, the pancreas makes little or no insulin.   In type 2 diabetes, the pancreas still makes some insulin, but the cells in the body stop responding normally. Eventually, the pancreas cannot make enough insulin to keep up.  Having excess body weight or obesity increases a person's risk of developing type 2 diabetes. But people without excess body weight can get diabetes, too.  What are the symptoms of type 2 diabetes? -- Type 2 diabetes usually causes no symptoms. When symptoms do happen, they include:   Needing to urinate often   Intense thirst   Blurry vision  Can diabetes lead to other health problems? -- Yes. Type 2 diabetes might not make you feel sick. But if it is not managed, it can lead to serious problems over time, such as:   Heart attacks   Strokes   Kidney disease   Vision problems (or even blindness)   Pain or loss of feeling in the hands and feet   Needing to have fingers, toes, or other body parts removed (amputated)  How do I know if I have type 2 diabetes? -- Your doctor or nurse can do a blood test. There are 2 tests that can be used for this. Both involve measuring the amount of sugar in your blood, called  "your \"blood sugar\" or \"blood glucose\":   One of the tests measures your blood sugar at the time the blood sample is taken. This test is done in the morning. You can't eat or drink anything except water for at least 8 hours before the test.   The other test shows what your average blood sugar has been for the past 2 to 3 months. This blood test is called \"hemoglobin A1C\" or just \"A1C.\" It can be checked at any time of the day, even if you have recently eaten.  How is type 2 diabetes treated? -- The goals of treatment are to manage your blood sugar and lower the risk of future problems that can happen in people with diabetes.  Treatment might include:   Lifestyle changes - This is an important part of managing diabetes. It includes eating healthy foods and getting plenty of physical activity.   Medicines - There are a few medicines that help lower blood sugar. Some people need to take pills that help the body make more insulin or that help insulin do its job. Others need insulin shots.  Depending on what medicines you take, you might need to check your blood sugar regularly at home. But not everyone with type 2 diabetes needs to do this. Your doctor or nurse will tell you if you should be checking your blood sugar, and when and how to do this.  Sometimes, people with type 2 diabetes also need medicines to help prevent problems caused by the disease. For instance, medicines used to lower blood pressure can reduce the chances of a heart attack or stroke.   General medical care - It's also important to take care of other areas of your health. This includes watching your blood pressure and cholesterol levels. You should also get certain vaccines, such as vaccines to protect against the flu and coronavirus disease 2019 (\"COVID-19\"). Some people also need a vaccine to prevent pneumonia.  Can type 2 diabetes be prevented? -- Yes. To lower your chances of getting type 2 diabetes, the most important thing you can do is eat a " healthy diet and get plenty of physical activity. This can help you lose weight if you are overweight. But eating well and being active are also good for your overall health. Even gentle activity, like walking, has benefits.  If you smoke, quitting can also lower your risk of type 2 diabetes. Quitting smoking can be difficult, but your doctor or nurse can help.  All topics are updated as new evidence becomes available and our peer review process is complete.  This topic retrieved from [employer] on: Apr 24, 2024.  Topic 52892 Version 23.0  Release: 32.3.2 - C32.113  © 2024 [employer], Inc[employer] and/or its affiliates. All rights reserved.  Consumer Information Use and Disclaimer   Disclaimer: This generalized information is a limited summary of diagnosis, treatment, and/or medication information. It is not meant to be comprehensive and should be used as a tool to help the user understand and/or assess potential diagnostic and treatment options. It does NOT include all information about conditions, treatments, medications, side effects, or risks that may apply to a specific patient. It is not intended to be medical advice or a substitute for the medical advice, diagnosis, or treatment of a health care provider based on the health care provider's examination and assessment of a patient's specific and unique circumstances. Patients must speak with a health care provider for complete information about their health, medical questions, and treatment options, including any risks or benefits regarding use of medications. This information does not endorse any treatments or medications as safe, effective, or approved for treating a specific patient. Up[employer], Inc. and its affiliates disclaim any warranty or liability relating to this information or the use thereof.The use of this information is governed by the Terms of Use, available at https://www.woltersQwayauwer.com/en/know/clinical-effectiveness-terms. 2024© Up[employer]Date, Inc. and its  affiliates and/or licensors. All rights reserved.  Copyright   © 2024 [employer] and/or its affiliates. All rights reserved.

## 2025-01-22 NOTE — ASSESSMENT & PLAN NOTE
Lab Results   Component Value Date    HGBA1C 9.0 (A) 01/22/2025       COMPLIANT WITH MEDICATION  DENIES ANY NUMBNESS, TINGLING IN FEET    - DISCUSSED DIETARY MODIFICATION OF CARBOHYDRATES  - HGB A1C AND URINE STUDIES DUE TODAY  - FOOT CARE  - RV 3 MONTHS

## 2025-01-22 NOTE — PROGRESS NOTES
Name: Dami López      : 1945      MRN: [id number]  Encounter Provider: Nickolas Chung MD  Encounter Date: 2025   Encounter department: [facility]    Assessment & Plan  Type 2 diabetes mellitus without complication, without long-term current use of insulin (HCC)    Lab Results   Component Value Date    HGBA1C 9.0 (A) 2025     Orders:  •  POCT hemoglobin A1c  •  Hemoglobin A1C; Future  •  Albumin / creatinine urine ratio; Future  •  Lipid Panel with Direct LDL reflex; Future    Type 2 diabetes mellitus with stage 3 chronic kidney disease, without long-term current use of insulin, unspecified whether stage 3a or 3b CKD (HCC)    Lab Results   Component Value Date    HGBA1C 9.0 (A) 2025       COMPLIANT WITH MEDICATION  DENIES ANY NUMBNESS, TINGLING IN FEET    - DISCUSSED DIETARY MODIFICATION OF CARBOHYDRATES  - HGB A1C AND URINE STUDIES DUE TODAY  - FOOT CARE  - RV 3 MONTHS         Irritable bowel syndrome with diarrhea  STABLE       Stage 3 chronic kidney disease, unspecified whether stage 3a or 3b CKD (HCC)  Lab Results   Component Value Date    EGFR 56 (L) 10/11/2024    EGFR 51 10/19/2023    EGFR 48 10/18/2023    CREATININE 1.32 (H) 10/11/2024    CREATININE 1.33 (H) 10/19/2023    CREATININE 1.40 (H) 10/18/2023       STABLE    Orders:  •  Comprehensive metabolic panel; Future    Obesity, morbid (HCC)         Anemia, unspecified type    Orders:  •  CBC and differential; Future  •  Iron; Future      Falls Plan of Care: balance, strength, and gait training instructions were provided.         History of Present Illness     PATIENT RETURNS FOR ROUTINE EVALUATION OF PATIENT'S MEDICAL ISSUES    INDIVIDUAL MEDICAL ISSUES WITH THEIR CURRENT STATUS, ASSESSMENT AND PLANS ARE LISTED ABOVE            Review of Systems   Constitutional:  Negative for chills, fatigue and fever.   HENT:  Negative for congestion, ear discharge, ear pain, mouth sores, postnasal drip, sore  throat and trouble swallowing.    Eyes:  Negative for pain, discharge and visual disturbance.   Respiratory:  Negative for cough, shortness of breath and wheezing.    Cardiovascular:  Negative for chest pain, palpitations and leg swelling.   Gastrointestinal:  Negative for abdominal distention, abdominal pain, blood in stool, diarrhea and nausea.   Endocrine: Negative for polydipsia, polyphagia and polyuria.   Genitourinary:  Negative for dysuria, frequency, hematuria and urgency.   Musculoskeletal:  Negative for arthralgias, gait problem and joint swelling.   Skin:  Negative for pallor and rash.   Neurological:  Negative for dizziness, syncope, speech difficulty, weakness, light-headedness, numbness and headaches.   Hematological:  Negative for adenopathy.   Psychiatric/Behavioral:  Negative for behavioral problems, confusion and sleep disturbance. The patient is not nervous/anxious.      Past Medical History:   Diagnosis Date   • Diabetes mellitus (HCC)    • Neoplasm of uncertain behavior of skin     Neck-Last assessed 06/13/17     Past Surgical History:   Procedure Laterality Date   • BRAIN SURGERY      hematoma in brain   • GASTRIC BYPASS      High   • REPLACEMENT TOTAL KNEE BILATERAL       Family History   Problem Relation Age of Onset   • Heart failure Mother         Congestive   • Substance Abuse Neg Hx    • Mental illness Neg Hx      Social History     Tobacco Use   • Smoking status: Never     Passive exposure: Never   • Smokeless tobacco: Never   Vaping Use   • Vaping status: Never Used   Substance and Sexual Activity   • Alcohol use: No     Comment: rarely   • Drug use: No   • Sexual activity: Not on file     Current Outpatient Medications on File Prior to Visit   Medication Sig   • glimepiride (AMARYL) 4 mg tablet Take 1 tablet (4 mg total) by mouth 2 (two) times a day   • metFORMIN (GLUCOPHAGE) 1000 MG tablet Take 1 tablet (1,000 mg total) by mouth 2 (two) times a day   • pioglitazone (ACTOS) 45 mg  "tablet Take 1 tablet (45 mg total) by mouth daily   • [DISCONTINUED] predniSONE 20 mg tablet 2 PO QD X 4 DAYS, THEN 1 PO QD X 4 DAYS, THEN 1/2 PO QD X 4 DAYS (Patient not taking: Reported on 1/22/2025)     Allergies   Allergen Reactions   • Morphine Nausea Only     Immunization History   Administered Date(s) Administered   • COVID-19 PFIZER VACCINE 0.3 ML IM 03/01/2021, 03/22/2021     Objective   /82 (BP Location: Left arm, Patient Position: Sitting, Cuff Size: Large)   Pulse 83   Temp (!) 96.7 °F (35.9 °C) (Temporal)   Resp 20   Ht 5' 10\" (1.778 m)   Wt 112 kg (247 lb)   SpO2 98%   BMI 35.44 kg/m²     Physical Exam  Vitals reviewed.   Constitutional:       General: He is not in acute distress.     Appearance: Normal appearance. He is well-developed. He is obese. He is not ill-appearing.   HENT:      Head: Normocephalic and atraumatic.      Nose: Nose normal.      Mouth/Throat:      Mouth: Mucous membranes are moist.   Eyes:      General:         Right eye: No discharge.         Left eye: No discharge.      Conjunctiva/sclera: Conjunctivae normal.      Pupils: Pupils are equal, round, and reactive to light.   Neck:      Thyroid: No thyromegaly.      Vascular: No JVD.   Cardiovascular:      Rate and Rhythm: Normal rate and regular rhythm.      Pulses: Pulses are weak.           Dorsalis pedis pulses are 1+ on the right side and 1+ on the left side.        Posterior tibial pulses are 1+ on the right side and 1+ on the left side.      Heart sounds: Normal heart sounds. No murmur heard.  Pulmonary:      Effort: Pulmonary effort is normal.      Breath sounds: Normal breath sounds. No wheezing or rales.   Abdominal:      General: Bowel sounds are normal.      Palpations: Abdomen is soft. There is no mass.      Tenderness: There is no abdominal tenderness. There is no guarding or rebound.   Musculoskeletal:         General: No tenderness or deformity. Normal range of motion.      Cervical back: Neck supple.    "   Comments: MILD DJD CHANGES   Feet:      Right foot:      Skin integrity: No ulcer, skin breakdown, erythema, warmth, callus or dry skin.      Left foot:      Skin integrity: No ulcer, skin breakdown, erythema, warmth, callus or dry skin.   Lymphadenopathy:      Cervical: No cervical adenopathy.   Skin:     General: Skin is warm and dry.      Findings: No erythema or rash.   Neurological:      General: No focal deficit present.      Mental Status: He is alert and oriented to person, place, and time.   Psychiatric:         Mood and Affect: Mood normal.         Behavior: Behavior normal.         Thought Content: Thought content normal.         Judgment: Judgment normal.     Diabetic Foot Exam    Patient's shoes and socks removed.    Right Foot/Ankle   Right Foot Inspection  Skin Exam: skin normal and skin intact. No dry skin, no warmth, no callus, no erythema, no maceration, no abnormal color, no pre-ulcer, no ulcer and no callus.     Toe Exam: ROM and strength within normal limits.     Sensory   Monofilament testing: intact    Vascular  The right DP pulse is 1+. The right PT pulse is 1+.     Left Foot/Ankle  Left Foot Inspection  Skin Exam: skin normal and skin intact. No dry skin, no warmth, no erythema, no maceration, normal color, no pre-ulcer, no ulcer and no callus.     Toe Exam: ROM and strength within normal limits.     Sensory   Monofilament testing: intact    Vascular  The left DP pulse is 1+. The left PT pulse is 1+.     Assign Risk Category  No deformity present  No loss of protective sensation  Weak pulses  Risk: 1

## 2025-01-22 NOTE — ASSESSMENT & PLAN NOTE
Lab Results   Component Value Date    EGFR 56 (L) 10/11/2024    EGFR 51 10/19/2023    EGFR 48 10/18/2023    CREATININE 1.32 (H) 10/11/2024    CREATININE 1.33 (H) 10/19/2023    CREATININE 1.40 (H) 10/18/2023       STABLE    Orders:  •  Comprehensive metabolic panel; Future

## 2025-02-27 ENCOUNTER — HOSPITAL ENCOUNTER (EMERGENCY)
Facility: HOSPITAL | Age: 80
Discharge: HOME | End: 2025-02-28
Attending: EMERGENCY MEDICINE
Payer: MEDICARE

## 2025-02-27 DIAGNOSIS — S46.919A SHOULDER STRAIN: ICD-10-CM

## 2025-02-27 DIAGNOSIS — D50.9 ANEMIA, IRON DEFICIENCY: ICD-10-CM

## 2025-02-27 DIAGNOSIS — N18.9 CKD (CHRONIC KIDNEY DISEASE): ICD-10-CM

## 2025-02-27 DIAGNOSIS — R07.89 ATYPICAL CHEST PAIN: ICD-10-CM

## 2025-02-27 DIAGNOSIS — M19.012 ARTHRITIS OF LEFT SHOULDER REGION: Primary | ICD-10-CM

## 2025-02-27 LAB
ALBUMIN SERPL BCG-MCNC: 3.8 G/DL (ref 3.5–5)
ALP SERPL-CCNC: 93 U/L (ref 34–104)
ALT SERPL W P-5'-P-CCNC: 4 U/L (ref 7–52)
ANION GAP SERPL CALCULATED.3IONS-SCNC: 11 MMOL/L (ref 4–13)
AST SERPL W P-5'-P-CCNC: 11 U/L (ref 13–39)
BASOPHILS # BLD AUTO: 0.03 THOUSANDS/ÂΜL (ref 0–0.1)
BASOPHILS NFR BLD AUTO: 0 % (ref 0–1)
BILIRUB SERPL-MCNC: 0.56 MG/DL (ref 0.2–1)
BUN SERPL-MCNC: 39 MG/DL (ref 5–25)
CALCIUM SERPL-MCNC: 8.5 MG/DL (ref 8.4–10.2)
CARDIAC TROPONIN I PNL SERPL HS: 11 NG/L (ref ?–50)
CHLORIDE SERPL-SCNC: 106 MMOL/L (ref 96–108)
CO2 SERPL-SCNC: 18 MMOL/L (ref 21–32)
CREAT SERPL-MCNC: 1.59 MG/DL (ref 0.6–1.3)
EOSINOPHIL # BLD AUTO: 0.08 THOUSAND/ÂΜL (ref 0–0.61)
EOSINOPHIL NFR BLD AUTO: 1 % (ref 0–6)
ERYTHROCYTE [DISTWIDTH] IN BLOOD BY AUTOMATED COUNT: 17.5 % (ref 11.6–15.1)
GFR SERPL CREATININE-BSD FRML MDRD: 40 ML/MIN/1.73SQ M
GLUCOSE SERPL-MCNC: 214 MG/DL (ref 65–140)
HCT VFR BLD AUTO: 29.8 % (ref 36.5–49.3)
HGB BLD-MCNC: 9.2 G/DL (ref 12–17)
IMM GRANULOCYTES # BLD AUTO: 0.03 THOUSAND/UL (ref 0–0.2)
IMM GRANULOCYTES NFR BLD AUTO: 0 % (ref 0–2)
LYMPHOCYTES # BLD AUTO: 0.77 THOUSANDS/ÂΜL (ref 0.6–4.47)
LYMPHOCYTES NFR BLD AUTO: 10 % (ref 14–44)
MCH RBC QN AUTO: 23.7 PG (ref 26.8–34.3)
MCHC RBC AUTO-ENTMCNC: 30.9 G/DL (ref 31.4–37.4)
MCV RBC AUTO: 77 FL (ref 82–98)
MONOCYTES # BLD AUTO: 0.83 THOUSAND/ÂΜL (ref 0.17–1.22)
MONOCYTES NFR BLD AUTO: 11 % (ref 4–12)
NEUTROPHILS # BLD AUTO: 5.89 THOUSANDS/ÂΜL (ref 1.85–7.62)
NEUTS SEG NFR BLD AUTO: 78 % (ref 43–75)
NRBC BLD AUTO-RTO: 0 /100 WBCS
PLATELET # BLD AUTO: 183 THOUSANDS/UL (ref 149–390)
PMV BLD AUTO: 10.6 FL (ref 8.9–12.7)
POTASSIUM SERPL-SCNC: 4.7 MMOL/L (ref 3.5–5.3)
PROT SERPL-MCNC: 6.9 G/DL (ref 6.4–8.4)
RBC # BLD AUTO: 3.88 MILLION/UL (ref 3.88–5.62)
SODIUM SERPL-SCNC: 135 MMOL/L (ref 135–147)
WBC # BLD AUTO: 7.63 THOUSAND/UL (ref 4.31–10.16)

## 2025-02-27 PROCEDURE — 36415 COLL VENOUS BLD VENIPUNCTURE: CPT | Performed by: EMERGENCY MEDICINE

## 2025-02-27 PROCEDURE — 84484 ASSAY OF TROPONIN QUANT: CPT | Performed by: EMERGENCY MEDICINE

## 2025-02-27 PROCEDURE — 85025 COMPLETE CBC W/AUTO DIFF WBC: CPT | Performed by: EMERGENCY MEDICINE

## 2025-02-27 PROCEDURE — 80053 COMPREHEN METABOLIC PANEL: CPT | Performed by: EMERGENCY MEDICINE

## 2025-02-27 PROCEDURE — 99284 EMERGENCY DEPT VISIT MOD MDM: CPT

## 2025-02-27 PROCEDURE — 99285 EMERGENCY DEPT VISIT HI MDM: CPT | Performed by: EMERGENCY MEDICINE

## 2025-02-27 PROCEDURE — 93005 ELECTROCARDIOGRAM TRACING: CPT

## 2025-02-27 RX ORDER — CYCLOBENZAPRINE HCL 10 MG
10 TABLET ORAL ONCE
Status: COMPLETED | OUTPATIENT
Start: 2025-02-27 | End: 2025-02-27

## 2025-02-27 RX ORDER — ACETAMINOPHEN 325 MG/1
975 TABLET ORAL ONCE
Status: COMPLETED | OUTPATIENT
Start: 2025-02-27 | End: 2025-02-27

## 2025-02-27 RX ORDER — OXYCODONE HYDROCHLORIDE 5 MG/1
5 TABLET ORAL ONCE
Refills: 0 | Status: COMPLETED | OUTPATIENT
Start: 2025-02-27 | End: 2025-02-28

## 2025-02-27 RX ADMIN — CYCLOBENZAPRINE 10 MG: 10 TABLET, FILM COATED ORAL at 22:50

## 2025-02-27 RX ADMIN — ACETAMINOPHEN 975 MG: 325 TABLET ORAL at 22:50

## 2025-02-28 ENCOUNTER — APPOINTMENT (EMERGENCY)
Dept: RADIOLOGY | Facility: HOSPITAL | Age: 80
End: 2025-02-28
Payer: MEDICARE

## 2025-02-28 VITALS
WEIGHT: 249.4 LBS | TEMPERATURE: 98.9 F | RESPIRATION RATE: 18 BRPM | SYSTOLIC BLOOD PRESSURE: 180 MMHG | OXYGEN SATURATION: 97 % | DIASTOLIC BLOOD PRESSURE: 92 MMHG | HEART RATE: 87 BPM | BODY MASS INDEX: 35.79 KG/M2

## 2025-02-28 LAB
2HR DELTA HS TROPONIN: 0 NG/L
ATRIAL RATE: 81 BPM
CARDIAC TROPONIN I PNL SERPL HS: 11 NG/L (ref ?–50)
P AXIS: 55 DEGREES
PR INTERVAL: 218 MS
QRS AXIS: -61 DEGREES
QRSD INTERVAL: 156 MS
QT INTERVAL: 394 MS
QTC INTERVAL: 458 MS
T WAVE AXIS: 37 DEGREES
VENTRICULAR RATE: 81 BPM

## 2025-02-28 PROCEDURE — 36415 COLL VENOUS BLD VENIPUNCTURE: CPT | Performed by: EMERGENCY MEDICINE

## 2025-02-28 PROCEDURE — 73030 X-RAY EXAM OF SHOULDER: CPT

## 2025-02-28 PROCEDURE — 84484 ASSAY OF TROPONIN QUANT: CPT | Performed by: EMERGENCY MEDICINE

## 2025-02-28 PROCEDURE — 71045 X-RAY EXAM CHEST 1 VIEW: CPT

## 2025-02-28 PROCEDURE — 93010 ELECTROCARDIOGRAM REPORT: CPT | Performed by: INTERNAL MEDICINE

## 2025-02-28 PROCEDURE — 96360 HYDRATION IV INFUSION INIT: CPT

## 2025-02-28 RX ORDER — ACETAMINOPHEN 500 MG
500 TABLET ORAL 2 TIMES DAILY
Qty: 66 TABLET | Refills: 0 | Status: SHIPPED | OUTPATIENT
Start: 2025-02-28

## 2025-02-28 RX ORDER — LIDOCAINE 50 MG/G
1 PATCH TOPICAL DAILY
Qty: 15 PATCH | Refills: 0 | Status: SHIPPED | OUTPATIENT
Start: 2025-02-28

## 2025-02-28 RX ORDER — METHOCARBAMOL 750 MG/1
750 TABLET, FILM COATED ORAL 2 TIMES DAILY
Qty: 22 TABLET | Refills: 0 | Status: SHIPPED | OUTPATIENT
Start: 2025-02-28

## 2025-02-28 RX ADMIN — OXYCODONE HYDROCHLORIDE 5 MG: 5 TABLET ORAL at 00:02

## 2025-02-28 RX ADMIN — SODIUM CHLORIDE 500 ML: 0.9 INJECTION, SOLUTION INTRAVENOUS at 00:52
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
## 2025-03-10 VITALS — HEIGHT: 70 IN | WEIGHT: 246 LBS | BODY MASS INDEX: 35.22 KG/M2

## 2025-03-10 DIAGNOSIS — M12.812 LEFT ROTATOR CUFF TEAR ARTHROPATHY: Primary | ICD-10-CM

## 2025-03-10 DIAGNOSIS — S46.919A SHOULDER STRAIN: ICD-10-CM

## 2025-03-10 DIAGNOSIS — M75.102 LEFT ROTATOR CUFF TEAR ARTHROPATHY: Primary | ICD-10-CM

## 2025-03-10 PROCEDURE — 99203 OFFICE O/P NEW LOW 30 MIN: CPT | Performed by: ORTHOPAEDIC SURGERY
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
## 2025-04-22 ENCOUNTER — OFFICE VISIT (OUTPATIENT)
Dept: FAMILY MEDICINE CLINIC | Facility: CLINIC | Age: 80
End: 2025-04-22
Payer: MEDICARE

## 2025-04-22 VITALS
TEMPERATURE: 97.9 F | WEIGHT: 243.4 LBS | OXYGEN SATURATION: 99 % | RESPIRATION RATE: 20 BRPM | HEART RATE: 65 BPM | BODY MASS INDEX: 34.84 KG/M2 | DIASTOLIC BLOOD PRESSURE: 88 MMHG | SYSTOLIC BLOOD PRESSURE: 150 MMHG | HEIGHT: 70 IN

## 2025-04-22 DIAGNOSIS — E11.22 TYPE 2 DIABETES MELLITUS WITH STAGE 3 CHRONIC KIDNEY DISEASE, WITHOUT LONG-TERM CURRENT USE OF INSULIN, UNSPECIFIED WHETHER STAGE 3A OR 3B CKD (HCC): Primary | ICD-10-CM

## 2025-04-22 DIAGNOSIS — N18.30 STAGE 3 CHRONIC KIDNEY DISEASE, UNSPECIFIED WHETHER STAGE 3A OR 3B CKD (HCC): ICD-10-CM

## 2025-04-22 DIAGNOSIS — K58.0 IRRITABLE BOWEL SYNDROME WITH DIARRHEA: ICD-10-CM

## 2025-04-22 DIAGNOSIS — N18.30 TYPE 2 DIABETES MELLITUS WITH STAGE 3 CHRONIC KIDNEY DISEASE, WITHOUT LONG-TERM CURRENT USE OF INSULIN, UNSPECIFIED WHETHER STAGE 3A OR 3B CKD (HCC): Primary | ICD-10-CM

## 2025-04-22 LAB
CREAT UR-MCNC: 65.6 MG/DL
MICROALBUMIN UR-MCNC: 33.7 MG/L
MICROALBUMIN/CREAT 24H UR: 51 MG/G CREATININE (ref 0–30)
SL AMB POCT HEMOGLOBIN AIC: 7.5 (ref ?–6.5)

## 2025-04-22 PROCEDURE — 99214 OFFICE O/P EST MOD 30 MIN: CPT | Performed by: FAMILY MEDICINE

## 2025-04-22 PROCEDURE — G2211 COMPLEX E/M VISIT ADD ON: HCPCS | Performed by: FAMILY MEDICINE

## 2025-04-22 PROCEDURE — 82570 ASSAY OF URINE CREATININE: CPT | Performed by: FAMILY MEDICINE

## 2025-04-22 PROCEDURE — 83036 HEMOGLOBIN GLYCOSYLATED A1C: CPT | Performed by: FAMILY MEDICINE

## 2025-04-22 PROCEDURE — 82043 UR ALBUMIN QUANTITATIVE: CPT | Performed by: FAMILY MEDICINE

## 2025-04-22 RX ORDER — TIRZEPATIDE 2.5 MG/.5ML
2.5 INJECTION, SOLUTION SUBCUTANEOUS WEEKLY
Qty: 2 ML | Refills: 0 | Status: SHIPPED | OUTPATIENT
Start: 2025-04-22

## 2025-04-22 NOTE — ASSESSMENT & PLAN NOTE
Lab Results   Component Value Date    HGBA1C 7.5 (A) 04/22/2025     COMPLIANT WITH MEDICATION  DENIES ANY NUMBNESS, TINGLING IN FEET    - DISCUSSED DIETARY MODIFICATION OF CARBOHYDRATES  - HGB A1C AND URINE STUDIES DUE TODAY  - FOOT CARE  - RV 3 MONTHS    Orders:  •  POCT hemoglobin A1c  •  Albumin / creatinine urine ratio  •  Tirzepatide (Mounjaro) 2.5 MG/0.5ML SOAJ; Inject 2.5 mg under the skin once a week

## 2025-04-22 NOTE — ASSESSMENT & PLAN NOTE
Lab Results   Component Value Date    EGFR 40 02/27/2025    EGFR 56 (L) 10/11/2024    EGFR 51 10/19/2023    CREATININE 1.59 (H) 02/27/2025    CREATININE 1.32 (H) 10/11/2024    CREATININE 1.33 (H) 10/19/2023

## 2025-04-22 NOTE — PROGRESS NOTES
Name: Dami López      : 1945      MRN: [id number]  Encounter Provider: Nickolas Chung MD  Encounter Date: 2025   Encounter department: [facility]    Assessment & Plan  Type 2 diabetes mellitus with stage 3 chronic kidney disease, without long-term current use of insulin, unspecified whether stage 3a or 3b CKD (HCC)    Lab Results   Component Value Date    HGBA1C 7.5 (A) 2025     COMPLIANT WITH MEDICATION  DENIES ANY NUMBNESS, TINGLING IN FEET    - DISCUSSED DIETARY MODIFICATION OF CARBOHYDRATES  - HGB A1C AND URINE STUDIES DUE TODAY  - FOOT CARE  - RV 3 MONTHS    Orders:  •  POCT hemoglobin A1c  •  Albumin / creatinine urine ratio  •  Tirzepatide (Mounjaro) 2.5 MG/0.5ML SOAJ; Inject 2.5 mg under the skin once a week    Stage 3 chronic kidney disease, unspecified whether stage 3a or 3b CKD (HCC)  Lab Results   Component Value Date    EGFR 40 2025    EGFR 56 (L) 10/11/2024    EGFR 51 10/19/2023    CREATININE 1.59 (H) 2025    CREATININE 1.32 (H) 10/11/2024    CREATININE 1.33 (H) 10/19/2023            Irritable bowel syndrome with diarrhea  STABLE           Depression Screening and Follow-up Plan: Patient was screened for depression during today's encounter. They screened negative with a PHQ-2 score of 0.          History of Present Illness     PATIENT RETURNS FOR ROUTINE EVALUATION OF PATIENT'S MEDICAL ISSUES    INDIVIDUAL MEDICAL ISSUES WITH THEIR CURRENT STATUS, ASSESSMENT AND PLANS ARE LISTED ABOVE            Review of Systems   Constitutional:  Negative for chills, fatigue and fever.   HENT:  Negative for congestion, ear discharge, ear pain, mouth sores, postnasal drip, sore throat and trouble swallowing.    Eyes:  Negative for pain, discharge and visual disturbance.   Respiratory:  Negative for cough, shortness of breath and wheezing.    Cardiovascular:  Negative for chest pain, palpitations and leg swelling.   Gastrointestinal:  Negative for  abdominal distention, abdominal pain, blood in stool, diarrhea and nausea.   Endocrine: Negative for polydipsia, polyphagia and polyuria.   Genitourinary:  Negative for dysuria, frequency, hematuria and urgency.   Musculoskeletal:  Negative for arthralgias, gait problem and joint swelling.   Skin:  Negative for pallor and rash.   Neurological:  Negative for dizziness, syncope, speech difficulty, weakness, light-headedness, numbness and headaches.   Hematological:  Negative for adenopathy.   Psychiatric/Behavioral:  Negative for behavioral problems, confusion and sleep disturbance. The patient is not nervous/anxious.      Past Medical History:   Diagnosis Date   • Diabetes mellitus (HCC)    • Neoplasm of uncertain behavior of skin     Neck-Last assessed 06/13/17     Past Surgical History:   Procedure Laterality Date   • BRAIN SURGERY      hematoma in brain   • GASTRIC BYPASS      High   • REPLACEMENT TOTAL KNEE BILATERAL       Family History   Problem Relation Age of Onset   • Heart failure Mother         Congestive   • Substance Abuse Neg Hx    • Mental illness Neg Hx      Social History     Tobacco Use   • Smoking status: Never     Passive exposure: Never   • Smokeless tobacco: Never   Vaping Use   • Vaping status: Never Used   Substance and Sexual Activity   • Alcohol use: No     Comment: rarely   • Drug use: No   • Sexual activity: Not on file     Current Outpatient Medications on File Prior to Visit   Medication Sig   • acetaminophen (TYLENOL) 500 mg tablet Take 1 tablet (500 mg total) by mouth 2 (two) times a day   • glimepiride (AMARYL) 4 mg tablet Take 1 tablet (4 mg total) by mouth 2 (two) times a day   • metFORMIN (GLUCOPHAGE) 1000 MG tablet Take 1 tablet (1,000 mg total) by mouth 2 (two) times a day   • pioglitazone (ACTOS) 45 mg tablet Take 1 tablet (45 mg total) by mouth daily   • [DISCONTINUED] Diclofenac Sodium (VOLTAREN) 1 % Apply 2 g topically 2 (two) times a day (Patient not taking: Reported on  "4/22/2025)   • [DISCONTINUED] lidocaine (Lidoderm) 5 % Apply 1 patch topically over 12 hours daily Remove & Discard patch within 12 hours or as directed by MD (Patient not taking: Reported on 4/22/2025)   • [DISCONTINUED] methocarbamol (Robaxin-750) 750 mg tablet Take 1 tablet (750 mg total) by mouth 2 (two) times a day (Patient not taking: Reported on 4/22/2025)     Allergies   Allergen Reactions   • Morphine Nausea Only     Immunization History   Administered Date(s) Administered   • COVID-19 PFIZER VACCINE 0.3 ML IM 03/01/2021, 03/22/2021     Objective   /88   Pulse 65   Temp 97.9 °F (36.6 °C)   Resp 20   Ht 5' 10\" (1.778 m)   Wt 110 kg (243 lb 6.4 oz)   SpO2 99%   BMI 34.92 kg/m²     Physical Exam  Vitals reviewed.   Constitutional:       General: He is not in acute distress.     Appearance: Normal appearance. He is well-developed. He is obese. He is not ill-appearing.   HENT:      Head: Normocephalic and atraumatic.      Nose: Nose normal.      Mouth/Throat:      Mouth: Mucous membranes are moist.   Eyes:      General:         Right eye: No discharge.         Left eye: No discharge.      Conjunctiva/sclera: Conjunctivae normal.      Pupils: Pupils are equal, round, and reactive to light.   Neck:      Thyroid: No thyromegaly.      Vascular: No JVD.   Cardiovascular:      Rate and Rhythm: Normal rate and regular rhythm.      Heart sounds: Normal heart sounds. No murmur heard.  Pulmonary:      Effort: Pulmonary effort is normal.      Breath sounds: Normal breath sounds. No wheezing or rales.   Abdominal:      General: Bowel sounds are normal.      Palpations: Abdomen is soft. There is no mass.      Tenderness: There is no abdominal tenderness. There is no guarding or rebound.   Musculoskeletal:         General: No tenderness or deformity.      Cervical back: Neck supple.      Comments: MODERATE DJD CHANGES   Lymphadenopathy:      Cervical: No cervical adenopathy.   Skin:     General: Skin is warm and " dry.      Findings: No erythema or rash.   Neurological:      General: No focal deficit present.      Mental Status: He is alert and oriented to person, place, and time.   Psychiatric:         Mood and Affect: Mood normal.         Behavior: Behavior normal.         Thought Content: Thought content normal.         Judgment: Judgment normal.

## 2025-05-01 DIAGNOSIS — E11.9 TYPE 2 DIABETES MELLITUS WITHOUT COMPLICATION, WITHOUT LONG-TERM CURRENT USE OF INSULIN (HCC): ICD-10-CM

## 2025-05-01 RX ORDER — GLIMEPIRIDE 4 MG/1
4 TABLET ORAL 2 TIMES DAILY
Qty: 180 TABLET | Refills: 1 | Status: SHIPPED | OUTPATIENT
Start: 2025-05-01

## 2025-05-27 DIAGNOSIS — E11.9 TYPE 2 DIABETES MELLITUS WITHOUT COMPLICATION, WITHOUT LONG-TERM CURRENT USE OF INSULIN (HCC): ICD-10-CM

## 2025-05-27 RX ORDER — PIOGLITAZONE 45 MG/1
45 TABLET ORAL DAILY
Qty: 90 TABLET | Refills: 1 | Status: SHIPPED | OUTPATIENT
Start: 2025-05-27

## 2025-06-16 DIAGNOSIS — E11.9 TYPE 2 DIABETES MELLITUS WITHOUT COMPLICATION, WITHOUT LONG-TERM CURRENT USE OF INSULIN (HCC): ICD-10-CM

## 2025-06-16 RX ORDER — GLIMEPIRIDE 4 MG/1
4 TABLET ORAL 2 TIMES DAILY
Qty: 180 TABLET | Refills: 1 | Status: SHIPPED | OUTPATIENT
Start: 2025-06-16

## 2025-06-16 RX ORDER — PIOGLITAZONE 45 MG/1
45 TABLET ORAL DAILY
Qty: 90 TABLET | Refills: 1 | Status: SHIPPED | OUTPATIENT
Start: 2025-06-16

## 2025-07-22 ENCOUNTER — RA CDI HCC (OUTPATIENT)
Dept: OTHER | Facility: HOSPITAL | Age: 80
End: 2025-07-22

## 2025-07-29 ENCOUNTER — OFFICE VISIT (OUTPATIENT)
Dept: FAMILY MEDICINE CLINIC | Facility: CLINIC | Age: 80
End: 2025-07-29
Payer: MEDICARE

## 2025-07-29 VITALS
TEMPERATURE: 97.6 F | DIASTOLIC BLOOD PRESSURE: 84 MMHG | HEART RATE: 72 BPM | RESPIRATION RATE: 20 BRPM | BODY MASS INDEX: 33.99 KG/M2 | OXYGEN SATURATION: 99 % | HEIGHT: 70 IN | SYSTOLIC BLOOD PRESSURE: 124 MMHG | WEIGHT: 237.4 LBS

## 2025-07-29 DIAGNOSIS — N18.30 TYPE 2 DIABETES MELLITUS WITH STAGE 3 CHRONIC KIDNEY DISEASE, WITHOUT LONG-TERM CURRENT USE OF INSULIN, UNSPECIFIED WHETHER STAGE 3A OR 3B CKD (HCC): Primary | ICD-10-CM

## 2025-07-29 DIAGNOSIS — N18.30 STAGE 3 CHRONIC KIDNEY DISEASE, UNSPECIFIED WHETHER STAGE 3A OR 3B CKD (HCC): ICD-10-CM

## 2025-07-29 DIAGNOSIS — E66.9 OBESITY (BMI 30-39.9): ICD-10-CM

## 2025-07-29 DIAGNOSIS — D64.9 ANEMIA, UNSPECIFIED TYPE: ICD-10-CM

## 2025-07-29 DIAGNOSIS — K58.0 IRRITABLE BOWEL SYNDROME WITH DIARRHEA: ICD-10-CM

## 2025-07-29 DIAGNOSIS — E11.22 TYPE 2 DIABETES MELLITUS WITH STAGE 3 CHRONIC KIDNEY DISEASE, WITHOUT LONG-TERM CURRENT USE OF INSULIN, UNSPECIFIED WHETHER STAGE 3A OR 3B CKD (HCC): Primary | ICD-10-CM

## 2025-07-29 LAB — SL AMB POCT HEMOGLOBIN AIC: 7.8 (ref ?–6.5)

## 2025-07-29 PROCEDURE — 83036 HEMOGLOBIN GLYCOSYLATED A1C: CPT | Performed by: FAMILY MEDICINE

## 2025-07-29 PROCEDURE — G2211 COMPLEX E/M VISIT ADD ON: HCPCS | Performed by: FAMILY MEDICINE

## 2025-07-29 PROCEDURE — 99214 OFFICE O/P EST MOD 30 MIN: CPT | Performed by: FAMILY MEDICINE

## 2025-07-29 RX ORDER — TIRZEPATIDE 2.5 MG/.5ML
2.5 INJECTION, SOLUTION SUBCUTANEOUS WEEKLY
Qty: 2 ML | Refills: 0 | Status: SHIPPED | OUTPATIENT
Start: 2025-07-29